# Patient Record
Sex: FEMALE | Race: WHITE | Employment: OTHER | URBAN - METROPOLITAN AREA
[De-identification: names, ages, dates, MRNs, and addresses within clinical notes are randomized per-mention and may not be internally consistent; named-entity substitution may affect disease eponyms.]

---

## 2017-02-09 ENCOUNTER — OFFICE VISIT (OUTPATIENT)
Dept: PAIN MANAGEMENT | Age: 57
End: 2017-02-09

## 2017-02-09 VITALS
HEART RATE: 86 BPM | BODY MASS INDEX: 23 KG/M2 | SYSTOLIC BLOOD PRESSURE: 149 MMHG | WEIGHT: 134 LBS | DIASTOLIC BLOOD PRESSURE: 84 MMHG

## 2017-02-09 DIAGNOSIS — M48.02 SPINAL STENOSIS IN CERVICAL REGION: ICD-10-CM

## 2017-02-09 DIAGNOSIS — Z79.899 ENCOUNTER FOR LONG-TERM (CURRENT) USE OF MEDICATIONS: ICD-10-CM

## 2017-02-09 DIAGNOSIS — M25.50 POLYARTHRALGIA: ICD-10-CM

## 2017-02-09 DIAGNOSIS — G89.4 CHRONIC PAIN SYNDROME: ICD-10-CM

## 2017-02-09 DIAGNOSIS — M50.30 DEGENERATION OF CERVICAL INTERVERTEBRAL DISC: ICD-10-CM

## 2017-02-09 DIAGNOSIS — M15.9 GENERALIZED OA: Primary | ICD-10-CM

## 2017-02-09 DIAGNOSIS — R51.9 CHRONIC DAILY HEADACHE: ICD-10-CM

## 2017-02-09 DIAGNOSIS — M51.37 DEGENERATION OF LUMBAR OR LUMBOSACRAL INTERVERTEBRAL DISC: ICD-10-CM

## 2017-02-09 LAB
ALCOHOL UR POC: NORMAL
AMPHETAMINES UR POC: NEGATIVE
BARBITURATES UR POC: NORMAL
BENZODIAZEPINES UR POC: NORMAL
BUPRENORPHINE UR POC: NORMAL
CANNABINOIDS UR POC: NEGATIVE
CARISOPRODOL UR POC: NORMAL
COCAINE UR POC: NEGATIVE
FENTANYL UR POC: NORMAL
MDMA/ECSTASY UR POC: NEGATIVE
METHADONE UR POC: NEGATIVE
METHAMPHETAMINE UR POC: NEGATIVE
METHYLPHENIDATE UR POC: NEGATIVE
OPIATES UR POC: NORMAL
OXYCODONE UR POC: NORMAL
PHENCYCLIDINE UR POC: NEGATIVE
PROPOXYPHENE UR POC: NORMAL
TRAMADOL UR POC: NORMAL
TRICYCLICS UR POC: NEGATIVE

## 2017-02-09 RX ORDER — HYDROCODONE BITARTRATE AND ACETAMINOPHEN 10; 325 MG/1; MG/1
1 TABLET ORAL
Qty: 150 TAB | Refills: 0 | Status: SHIPPED | OUTPATIENT
Start: 2017-02-09 | End: 2017-07-28 | Stop reason: SDUPTHER

## 2017-02-09 RX ORDER — HYDROCODONE BITARTRATE AND ACETAMINOPHEN 10; 325 MG/1; MG/1
1 TABLET ORAL
Qty: 150 TAB | Refills: 0 | Status: SHIPPED | OUTPATIENT
Start: 2017-04-08 | End: 2017-05-02 | Stop reason: SDUPTHER

## 2017-02-09 RX ORDER — HYDROCODONE BITARTRATE AND ACETAMINOPHEN 10; 325 MG/1; MG/1
1 TABLET ORAL
Qty: 150 TAB | Refills: 0 | Status: SHIPPED | OUTPATIENT
Start: 2017-03-10 | End: 2017-05-02 | Stop reason: SDUPTHER

## 2017-02-09 RX ORDER — BUTALBITAL, ACETAMINOPHEN AND CAFFEINE 50; 325; 40 MG/1; MG/1; MG/1
1 TABLET ORAL EVERY 4 HOURS
Qty: 180 TAB | Refills: 5 | Status: SHIPPED | OUTPATIENT
Start: 2017-05-06 | End: 2017-05-02 | Stop reason: SDUPTHER

## 2017-02-09 NOTE — PROGRESS NOTES
Nursing Notes    Patient presents to the office today in follow-up. Patient rates her pain at 2/10 on the numerical pain scale. Reviewed medications with counts as follows:    Rx Date filled Qty Dispensed Pill Count Last Dose Short   norco 10 1/11/17 150 31 2/9/17 no   Ativan  1/28/17 60 36 2/9/17 no         Comments:     POC UDS was performed in office today    Any new labs or imaging since last appointment? NO    Have you been to an emergency room (ER) or urgent care clinic since your last visit? NO            Have you been hospitalized since your last visit? NO     If yes, where, when, and reason for visit? Have you seen or consulted any other health care providers outside of the [address] [place] [address]  since your last visit? NO     If yes, where, when, and reason for visit? Ms. Mookie Jeff has a reminder for a \"due or due soon\" health maintenance. I have asked that she contact her primary care provider for follow-up on this health maintenance.

## 2017-02-09 NOTE — PROGRESS NOTES
HISTORY OF PRESENT ILLNESS  Lolis Solorzano is a 64 y.o. female. HPI she returns for follow-up of chronic, severe pain which is widespread and polyarticular and related to generalized osteoarthritis. She also suffers from chronic daily headaches and underlying cervical and lumbar degenerative disc disease with spondylosis and cervical stenosis. Pain continues under excellent control with 80% overall relief being reported. Physical activity and mobility as well as mood are good, sleep is fair. No reported side effects. A review of the [address] [place] [address] prescription monitoring program does not identify any inconsistency. UDS obtained and reviewed; formal confirmation from laboratory is pending. Review of Systems   Constitutional: Negative for weight loss (  ). Eyes:        Reading glasses   Respiratory: Negative. Cardiovascular: Negative. Gastrointestinal: Negative for constipation, nausea (with migraines) and vomiting. Genitourinary: Negative. Musculoskeletal: Positive for back pain, myalgias and neck pain. Skin: Negative. Neurological: Positive for tingling (hands) and headaches. Endo/Heme/Allergies:        Recent GI bleed/ulcers in January   Psychiatric/Behavioral: Negative. All other systems reviewed and are negative. Physical Exam   Constitutional: She is oriented to person, place, and time. She appears well-developed and well-nourished. No distress. HENT:   Head: Normocephalic. Eyes: EOM are normal. Pupils are equal, round, and reactive to light.   glasses   Neck: Normal range of motion. No thyromegaly present. Tender/tight musclature   Pulmonary/Chest: Effort normal.   Musculoskeletal: She exhibits tenderness (cervical/over shoulders/lumbar). She exhibits no edema. Neurological: She is alert and oriented to person, place, and time. No cranial nerve deficit (grossly intact).  Gait normal.   Patellar DTR wnl/ lower extremities-strength normal   Skin: Skin is warm and dry.   Psychiatric: She has a normal mood and affect. Her behavior is normal. Judgment and thought content normal.   Nursing note and vitals reviewed. ASSESSMENT and PLAN  Encounter Diagnoses   Name Primary?  Generalized OA Yes    Encounter for long-term (current) use of medications     Spinal stenosis in cervical region     Degeneration of cervical intervertebral disc     Chronic daily headache     Chronic pain syndrome     Degeneration of lumbar or lumbosacral intervertebral disc     Polyarthralgia      She will continue on her current analgesic regimen as this is providing excellent pain control with improve functionality and minimal side effects. 3 month reassess her    No concerns are raised for misuse, abuse, or diversion. 1. Pain medications are prescribed with the objective of pain relief and improved physical and psychosocial function in this patient. 2. Counseled patient on proper use of prescribed medications and reviewed opioid contract. 3. Counseled patient about chronic medical conditions and their relationship to anxiety and depression and recommended mental health support as needed. 4. Reviewed with patient self-help tools, home exercise, and lifestyle changes to assist the patient in self-management of symptoms. 5. Advised patient to have a primary care provider to continue care for health maintenance and general medical conditions and support for referral to specialty care as needed. 6. Reviewed with patient the treatment plan, goals of treatment plan, and limitations of treatment plan, to include the potential for side effects from medications and procedures. If side effects occur, it is the responsibility of the patient to inform the clinic so that a change in the treatment plan can be made in a safe manner. The patient is advised that stopping prescribed medication may cause an increase in symptoms and possible medication withdrawal symptoms.  The patient is informed an emergency room evaluation may be necessary if this occurs. DISPOSITION: The patients condition and plan were discussed at length and all questions were answered. The patient agrees with the plan.     Counseling occupied > 50% of visit:  Total time: 30 minutes

## 2017-02-09 NOTE — MR AVS SNAPSHOT
Visit Information Date & Time Provider Department Dept. Phone Encounter #  
 2/9/2017 11:20 AM Monico Alcantar MD [facility] [id number] Follow-up Instructions Return in about 3 months (around 5/9/2017). Upcoming Health Maintenance Date Due  
 PAP AKA CERVICAL CYTOLOGY 9/1/1981 FOBT Q 1 YEAR AGE 50-75 9/1/2010 INFLUENZA AGE 9 TO ADULT 8/1/2016 BREAST CANCER SCRN MAMMOGRAM 6/30/2017 DTaP/Tdap/Td series (2 - Td) 2/6/2025 Allergies as of 2/9/2017  Review Complete On: 2/9/2017 By: Monico Alcantar MD  
  
 Severity Noted Reaction Type Reactions Aspirin  10/20/2011   Side Effect Other (comments) Nsaids (Non-steroidal Anti-inflammatory Drug)  07/28/2011   Intolerance Other (comments) Pt has had bleeding ulcers Current Immunizations  Reviewed on 9/15/2016 Name Date Influenza Vaccine 1/21/2013 Influenza Vaccine Whole 11/5/2009 Pneumococcal Polysaccharide (PPSV-23) 9/20/2016 TD Vaccine 6/14/2006 Tdap 2/6/2015  1:43 PM  
 Zoster Vaccine, Live 5/2/2013 Not reviewed this visit You Were Diagnosed With   
  
 Codes Comments Encounter for long-term (current) use of medications    -  Primary ICD-10-CM: B89.090 ICD-9-CM: V58.69 Spinal stenosis in cervical region     ICD-10-CM: M48.02 
ICD-9-CM: 723.0 Degeneration of cervical intervertebral disc     ICD-10-CM: M50.30 ICD-9-CM: 722.4 Vitals BP Pulse Weight(growth percentile) BMI OB Status Smoking Status 149/84 86 134 lb (60.8 kg) 23 kg/m2 Postmenopausal Current Every Day Smoker BMI and BSA Data Body Mass Index Body Surface Area  
 23 kg/m 2 1.66 m 2 Preferred Pharmacy Pharmacy Name Phone [address], [place] [address] [facility] [address] [facility] [phone] Your Updated Medication List  
  
   
 This list is accurate as of: 2/9/17 11:43 AM.  Always use your most recent med list.  
  
  
  
  
 albuterol 90 mcg/actuation inhaler Commonly known as:  Pablo Laroche Take 2 Puffs by inhalation every six (6) hours as needed. busPIRone 30 mg tablet Commonly known as:  BUSPAR Take 1 Tab by mouth two (2) times a day for 90 days. butalbital-acetaminophen-caffeine -40 mg per tablet Commonly known as:  Laveda Forge Take 1 Tab by mouth every four (4) hours for 30 days. Up to 6 times per day prn migraine  Indications: MIGRAINE, TENSION-TYPE HEADACHE Start taking on:  5/6/2017  
  
 cyclobenzaprine 10 mg tablet Commonly known as:  FLEXERIL  
TAKE 1 TABLET BY MOUTH THREE TIMES DAILY AS NEEDED FOR MUSCLE SPASM(S)  
  
 ergocalciferol 50,000 unit capsule Commonly known as:  ERGOCALCIFEROL  
TAKE 1 CAPSULE BY MOUTH EVERY 7 DAYS  
  
 * HYDROcodone-acetaminophen  mg tablet Commonly known as:  Rolinda Doles Take 1 Tab by mouth five (5) times daily for 30 days. For chronic pain  Indications: Pain  
  
 * HYDROcodone-acetaminophen  mg tablet Commonly known as:  Rolinda Doles Take 1 Tab by mouth five (5) times daily for 30 days. For chronic pain  Indications: Pain Start taking on:  3/10/2017  
  
 * HYDROcodone-acetaminophen  mg tablet Commonly known as:  Rolinda Doles Take 1 Tab by mouth five (5) times daily for 30 days. For chronic pain  Indications: Pain Start taking on:  4/8/2017  
  
 loratadine 10 mg tablet Commonly known as:  Latonia [place]un Take 10 mg by mouth. * pantoprazole 40 mg tablet Commonly known as:  PROTONIX  
TAKE 1 TABLET BY MOUTH ONCE DAILY  
  
 * pantoprazole 40 mg tablet Commonly known as:  PROTONIX  
TAKE 1 TABLET BY MOUTH ONCE DAILY promethazine 25 mg tablet Commonly known as:  PHENERGAN  
TAKE 1 TABLET BY MOUTH EVERY 8 HOURS AS NEEDED FOR NAUSEA/VOMITING FOR UP TO 90 DAYS. SUDAFED PO Take  by mouth. SUMAtriptan 100 mg tablet Commonly known as:  IMITREX Take 1 Tab by mouth once as needed for Migraine for up to 1 dose. Indications: MIGRAINE ZyrTEC 10 mg tablet Generic drug:  cetirizine Take  by mouth daily. * Notice: This list has 5 medication(s) that are the same as other medications prescribed for you. Read the directions carefully, and ask your doctor or other care provider to review them with you. Prescriptions Printed Refills HYDROcodone-acetaminophen (NORCO)  mg tablet 0 Starting on: 4/8/2017 Sig: Take 1 Tab by mouth five (5) times daily for 30 days. For chronic pain  Indications: Pain Class: Print Route: Oral  
 HYDROcodone-acetaminophen (NORCO)  mg tablet 0 Starting on: 3/10/2017 Sig: Take 1 Tab by mouth five (5) times daily for 30 days. For chronic pain  Indications: Pain Class: Print Route: Oral  
 HYDROcodone-acetaminophen (NORCO)  mg tablet 0 Sig: Take 1 Tab by mouth five (5) times daily for 30 days. For chronic pain  Indications: Pain Class: Print Route: Oral  
 butalbital-acetaminophen-caffeine (FIORICET, ESGIC) -40 mg per tablet 5 Starting on: 5/6/2017 Sig: Take 1 Tab by mouth every four (4) hours for 30 days. Up to 6 times per day prn migraine  Indications: MIGRAINE, TENSION-TYPE HEADACHE Class: Print Route: Oral  
  
We Performed the Following AMB POC DRUG SCREEN () [ [facility]] DRUG SCREEN [[id number] Custom] Follow-up Instructions Return in about 3 months (around 5/9/2017). Patient Instructions Current health maintenance issues were reviewed and the patient was advised to followup with his/her PCP for completion of these items. Introducing [facility]! Marsha Chambers introduces [employer] patient portal. Now you can access parts of your medical record, email your doctor's office, and request medication refills online. 1. In your internet browser, go to [email or web address] 2. Click on the First Time User? Click Here link in the Sign In box. You will see the New Member Sign Up page. 3. Enter your [employer] Access Code exactly as it appears below. You will not need to use this code after youve completed the sign-up process. If you do not sign up before the expiration date, you must request a new code. · Watchupt Access Code: [facility] Expires: 5/10/2017 11:36 AM 
 
4. Enter the last four digits of your Social Security Number (xxxx) and Date of Birth (mm/dd/yyyy) as indicated and click Submit. You will be taken to the next sign-up page. 5. Create a Watchupt ID. This will be your Thermedical login ID and cannot be changed, so think of one that is secure and easy to remember. 6. Create a [employer] password. You can change your password at any time. 7. Enter your Password Reset Question and Answer. This can be used at a later time if you forget your password. 8. Enter your e-mail address. You will receive e-mail notification when new information is available in [address] 9. Click Sign Up. You can now view and download portions of your medical record. 10. Click the Download Summary menu link to download a portable copy of your medical information. If you have questions, please visit the Frequently Asked Questions section of the [employer] website. Remember, [employer] is NOT to be used for urgent needs. For medical emergencies, dial 911. Now available from your iPhone and Android! Please provide this summary of care documentation to your next provider. Your primary care clinician is listed as Nikos Left. If you have any questions after today's visit, please call [phone].

## 2017-03-20 DIAGNOSIS — Z00.00 PHYSICAL EXAM: ICD-10-CM

## 2017-03-22 DIAGNOSIS — Z00.00 PHYSICAL EXAM: ICD-10-CM

## 2017-05-02 ENCOUNTER — OFFICE VISIT (OUTPATIENT)
Dept: PAIN MANAGEMENT | Age: 57
End: 2017-05-02

## 2017-05-02 VITALS
HEART RATE: 98 BPM | BODY MASS INDEX: 23 KG/M2 | SYSTOLIC BLOOD PRESSURE: 135 MMHG | DIASTOLIC BLOOD PRESSURE: 90 MMHG | WEIGHT: 134 LBS

## 2017-05-02 DIAGNOSIS — M15.9 GENERALIZED OA: Primary | ICD-10-CM

## 2017-05-02 DIAGNOSIS — R51.9 CHRONIC DAILY HEADACHE: ICD-10-CM

## 2017-05-02 DIAGNOSIS — M25.50 POLYARTHRALGIA: ICD-10-CM

## 2017-05-02 DIAGNOSIS — M50.30 DEGENERATION OF CERVICAL INTERVERTEBRAL DISC: ICD-10-CM

## 2017-05-02 DIAGNOSIS — M51.37 DEGENERATION OF LUMBAR OR LUMBOSACRAL INTERVERTEBRAL DISC: ICD-10-CM

## 2017-05-02 DIAGNOSIS — M48.02 SPINAL STENOSIS IN CERVICAL REGION: ICD-10-CM

## 2017-05-02 DIAGNOSIS — G89.4 CHRONIC PAIN SYNDROME: ICD-10-CM

## 2017-05-02 DIAGNOSIS — Z79.899 ENCOUNTER FOR LONG-TERM (CURRENT) USE OF HIGH-RISK MEDICATION: ICD-10-CM

## 2017-05-02 LAB
ALCOHOL UR POC: NORMAL
AMPHETAMINES UR POC: NEGATIVE
BARBITURATES UR POC: NEGATIVE
BENZODIAZEPINES UR POC: NORMAL
BUPRENORPHINE UR POC: NORMAL
CANNABINOIDS UR POC: NEGATIVE
CARISOPRODOL UR POC: NORMAL
COCAINE UR POC: NEGATIVE
FENTANYL UR POC: NORMAL
MDMA/ECSTASY UR POC: NEGATIVE
METHADONE UR POC: NEGATIVE
METHAMPHETAMINE UR POC: NEGATIVE
METHYLPHENIDATE UR POC: NEGATIVE
OPIATES UR POC: NORMAL
OXYCODONE UR POC: NEGATIVE
PHENCYCLIDINE UR POC: NEGATIVE
PROPOXYPHENE UR POC: NORMAL
TRAMADOL UR POC: NORMAL
TRICYCLICS UR POC: NEGATIVE

## 2017-05-02 RX ORDER — LORAZEPAM 1 MG/1
1 TABLET ORAL
Qty: 60 TAB | Refills: 5 | Status: SHIPPED | OUTPATIENT
Start: 2017-05-22 | End: 2017-10-20 | Stop reason: SDUPTHER

## 2017-05-02 RX ORDER — HYDROCODONE BITARTRATE AND ACETAMINOPHEN 10; 325 MG/1; MG/1
1 TABLET ORAL
Qty: 150 TAB | Refills: 0 | Status: SHIPPED | OUTPATIENT
Start: 2017-05-31 | End: 2017-06-30

## 2017-05-02 RX ORDER — NALOXONE HYDROCHLORIDE 4 MG/.1ML
4 SPRAY NASAL AS NEEDED
Qty: 1 BOX | Refills: 1 | Status: SHIPPED | OUTPATIENT
Start: 2017-05-02

## 2017-05-02 RX ORDER — VENLAFAXINE HYDROCHLORIDE 75 MG/1
CAPSULE, EXTENDED RELEASE ORAL
Qty: 270 CAP | Refills: 3 | Status: SHIPPED | OUTPATIENT
Start: 2017-05-02 | End: 2020-06-10

## 2017-05-02 RX ORDER — BUTALBITAL, ACETAMINOPHEN AND CAFFEINE 50; 325; 40 MG/1; MG/1; MG/1
1 TABLET ORAL EVERY 4 HOURS
Qty: 180 TAB | Refills: 5 | Status: SHIPPED | OUTPATIENT
Start: 2017-05-06 | End: 2017-07-28 | Stop reason: SDUPTHER

## 2017-05-02 RX ORDER — HYDROCODONE BITARTRATE AND ACETAMINOPHEN 10; 325 MG/1; MG/1
1 TABLET ORAL
Qty: 150 TAB | Refills: 0 | Status: SHIPPED | OUTPATIENT
Start: 2017-06-29 | End: 2017-07-29

## 2017-05-02 NOTE — PROGRESS NOTES
HISTORY OF PRESENT ILLNESS  Ramiro Way is a 64 y.o. female. HPI she returns for follow-up of chronic, severe pain which is widespread and polyarticular and related to generalized osteoarthritis. She also suffers from chronic daily headaches and underlying cervical and lumbar degenerative disc disease with spondylosis and cervical stenosis.     Pain continues under excellent control with 80% overall relief being reported. Pain level today 3 out of 10, outcome 5/28,(The lower the upper number, the better the outcome)  Physical activity and mobility are very good, mood is fair to good, sleep is good. No reported side effects. A current review of the  does not identify any inconsistency. UDS obtained and reviewed; formal confirmation from laboratory is pending. Review of Systems   Constitutional: Negative for weight loss (  ). Eyes:        Reading glasses   Respiratory: Negative. Cardiovascular: Negative. Gastrointestinal: Negative for constipation, nausea (with migraines) and vomiting. Genitourinary: Negative. Musculoskeletal: Positive for back pain, myalgias and neck pain. Skin: Negative. Neurological: Positive for tingling (hands) and headaches. Endo/Heme/Allergies:        Recent GI bleed/ulcers in January   Psychiatric/Behavioral: Negative. All other systems reviewed and are negative. Physical Exam   Constitutional: She is oriented to person, place, and time. She appears well-developed and well-nourished. No distress. HENT:   Head: Normocephalic. Eyes: EOM are normal. Pupils are equal, round, and reactive to light.   glasses   Neck: Normal range of motion. No thyromegaly present. Tender/tight musclature   Pulmonary/Chest: Effort normal.   Musculoskeletal: She exhibits tenderness (cervical/over shoulders/lumbar). She exhibits no edema. Neurological: She is alert and oriented to person, place, and time. No cranial nerve deficit (grossly intact).  Gait normal. Patellar DTR wnl/ lower extremities-strength normal   Skin: Skin is warm and dry. Psychiatric: She has a normal mood and affect. Her behavior is normal. Judgment and thought content normal.   Nursing note and vitals reviewed. ASSESSMENT and PLAN  Encounter Diagnoses   Name Primary?  Generalized OA Yes    Encounter for long-term (current) use of high-risk medication     Spinal stenosis in cervical region     Degeneration of cervical intervertebral disc     Chronic daily headache     Chronic pain syndrome     Degeneration of lumbar or lumbosacral intervertebral disc     Polyarthralgia      The results of her most recent urine drug screen revealing tramadol were discussed with the patient who adamantly denies the use of tramadol. Neither her  or her mother take this medication. 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 In any event, a repeat urine drug screen is obtained today as noted above. Because the patient's current regimen places him/her at increased risk for possible overdose, a prescription for naloxone nasal spray is being provided. The patient understands that this medication is only to be used in the setting of a possible overdose and that inadvertent use of this medication could precipitate overt withdrawal.    She will continue on her current analgesic regimen as this is providing excellent pain control with improve functionality and minimal side effects. 3 month reassess    No concerns are raised for misuse, abuse, or diversion. 1. Pain medications are prescribed with the objective of pain relief and improved physical and psychosocial function in this patient. 2. Counseled patient on proper use of prescribed medications and reviewed opioid contract.   3. Counseled patient about chronic medical conditions and their relationship to anxiety and depression and recommended mental health support as needed. 4. Reviewed with patient self-help tools, home exercise, and lifestyle changes to assist the patient in self-management of symptoms. 5. Advised patient to have a primary care provider to continue care for health maintenance and general medical conditions and support for referral to specialty care as needed. 6. Reviewed with patient the treatment plan, goals of treatment plan, and limitations of treatment plan, to include the potential for side effects from medications and procedures. If side effects occur, it is the responsibility of the patient to inform the clinic so that a change in the treatment plan can be made in a safe manner. The patient is advised that stopping prescribed medication may cause an increase in symptoms and possible medication withdrawal symptoms. The patient is informed an emergency room evaluation may be necessary if this occurs. DISPOSITION: The patients condition and plan were discussed at length and all questions were answered. The patient agrees with the plan.     Counseling occupied > 50% of visit:  Total time: 40 minutes

## 2017-05-02 NOTE — PROGRESS NOTES
Nursing Notes    Patient presents to the office today in follow-up. Patient rates her pain at 3/10 on the numerical pain scale. Reviewed medications with counts as follows:    Rx Date filled Qty Dispensed Pill Count Last Dose Short   Ativan 1 4/24/17 60 43 5/2/17 no   norco 10 4/1/17 150 17 5/2/17 no       Comments: pt does not know how tramadol got in her system. POC UDS was performed in office today    Any new labs or imaging since last appointment? no    Have you been to an emergency room (ER) or urgent care clinic since your last visit? NO , was seen by physician after altercation with dog       Have you been hospitalized since your last visit? NO     If yes, where, when, and reason for visit? Have you seen or consulted any other health care providers outside of the [facility]  since your last visit? NO     If yes, where, when, and reason for visit? Ms. Winston Alvarado has a reminder for a \"due or due soon\" health maintenance. I have asked that she contact her primary care provider for follow-up on this health maintenance.

## 2017-05-02 NOTE — MR AVS SNAPSHOT
Visit Information Date & Time Provider Department Dept. Phone Encounter #  
 5/2/2017  9:40 AM Luigi Wisdom MD [facility] [phone] Follow-up Instructions Return in about 3 months (around 8/2/2017). Upcoming Health Maintenance Date Due  
 PAP AKA CERVICAL CYTOLOGY 9/1/1981 FOBT Q 1 YEAR AGE 50-75 9/1/2010 BREAST CANCER SCRN MAMMOGRAM 6/30/2017 INFLUENZA AGE 9 TO ADULT 8/1/2017 DTaP/Tdap/Td series (2 - Td) 2/6/2025 Allergies as of 5/2/2017  Review Complete On: 5/2/2017 By: Luigi Wisdom MD  
  
 Severity Noted Reaction Type Reactions Aspirin  10/20/2011   Side Effect Other (comments) Nsaids (Non-steroidal Anti-inflammatory Drug)  07/28/2011   Intolerance Other (comments) Pt has had bleeding ulcers Current Immunizations  Reviewed on 9/15/2016 Name Date Influenza Vaccine 1/21/2013 Influenza Vaccine Whole 11/5/2009 Pneumococcal Polysaccharide (PPSV-23) 9/20/2016 TD Vaccine 6/14/2006 Tdap 2/6/2015  1:43 PM  
 Zoster Vaccine, Live 5/2/2013 Not reviewed this visit You Were Diagnosed With   
  
 Codes Comments Encounter for long-term (current) use of high-risk medication    -  Primary ICD-10-CM: E69.880 ICD-9-CM: V58.69 Spinal stenosis in cervical region     ICD-10-CM: M48.02 
ICD-9-CM: 723.0 Degeneration of cervical intervertebral disc     ICD-10-CM: M50.30 ICD-9-CM: 722.4 Vitals BP Pulse Weight(growth percentile) BMI OB Status Smoking Status 135/90 98 134 lb (60.8 kg) 23 kg/m2 Postmenopausal Current Every Day Smoker Vitals History BMI and BSA Data Body Mass Index Body Surface Area  
 23 kg/m 2 1.66 m 2 Preferred Pharmacy Pharmacy Name Phone [facility]/PHARMACY #[id number] [facility], [facility] [facility] [phone] Your Updated Medication List  
  
   
 This list is accurate as of: 5/2/17 10:36 AM.  Always use your most recent med list.  
  
  
  
  
 albuterol 90 mcg/actuation inhaler Commonly known as:  Naveen Nunu Take 2 Puffs by inhalation every six (6) hours as needed. butalbital-acetaminophen-caffeine -40 mg per tablet Commonly known as:  Nena Rings Take 1 Tab by mouth every four (4) hours for 30 days. Up to 6 times per day prn migraine  Indications: MIGRAINE, TENSION-TYPE HEADACHE Start taking on:  5/6/2017  
  
 cyclobenzaprine 10 mg tablet Commonly known as:  FLEXERIL  
TAKE 1 TABLET BY MOUTH THREE TIMES DAILY AS NEEDED FOR MUSCLE SPASM(S)  
  
 * HYDROcodone-acetaminophen  mg tablet Commonly known as:  Senthil Nigh Take 1 Tab by mouth five (5) times daily for 30 days. For chronic pain  Indications: Pain Start taking on:  5/31/2017  
  
 * HYDROcodone-acetaminophen  mg tablet Commonly known as:  Senthil Nigh Take 1 Tab by mouth five (5) times daily for 30 days. For chronic pain  Indications: Pain Start taking on:  6/29/2017  
  
 loratadine 10 mg tablet Commonly known as:  Rodriguez Brothers Take 10 mg by mouth. LORazepam 1 mg tablet Commonly known as:  ATIVAN Take 1 Tab by mouth two (2) times daily as needed for up to 30 days. Indications: MUSCLE SPASM Start taking on:  5/22/2017  
  
 naloxone 4 mg/actuation Spry 4 mg by Nasal route as needed for up to 2 doses. Indications: OPIOID TOXICITY  
  
 pantoprazole 40 mg tablet Commonly known as:  PROTONIX  
TAKE 1 TABLET BY MOUTH ONCE DAILY SUDAFED PO Take  by mouth. SUMAtriptan 100 mg tablet Commonly known as:  IMITREX Take 1 Tab by mouth once as needed for Migraine for up to 1 dose. Indications: MIGRAINE  
  
 venlafaxine-SR 75 mg capsule Commonly known as:  EFFEXOR-XR  
TAKE 3 CAPSULES BY MOUTH ONCE DAILY  90 days ZyrTEC 10 mg tablet Generic drug:  cetirizine Take  by mouth daily. * Notice: This list has 2 medication(s) that are the same as other medications prescribed for you. Read the directions carefully, and ask your doctor or other care provider to review them with you. Prescriptions Printed Refills HYDROcodone-acetaminophen (NORCO)  mg tablet 0 Starting on: 2017 Sig: Take 1 Tab by mouth five (5) times daily for 30 days. For chronic pain  Indications: Pain Class: Print Route: Oral  
 HYDROcodone-acetaminophen (NORCO)  mg tablet 0 Starting on: 2017 Sig: Take 1 Tab by mouth five (5) times daily for 30 days. For chronic pain  Indications: Pain Class: Print Route: Oral  
 LORazepam (ATIVAN) 1 mg tablet 5 Starting on: 2017 Sig: Take 1 Tab by mouth two (2) times daily as needed for up to 30 days. Indications: MUSCLE SPASM Class: Print Route: Oral  
 butalbital-acetaminophen-caffeine (FIORICET, ESGIC) -40 mg per tablet 5 Starting on: 2017 Sig: Take 1 Tab by mouth every four (4) hours for 30 days. Up to 6 times per day prn migraine  Indications: MIGRAINE, TENSION-TYPE HEADACHE Class: Print Route: Oral  
  
Prescriptions Sent to Pharmacy Refills  
 venlafaxine-SR (EFFEXOR-XR) 75 mg capsule 3 Sig: TAKE 3 CAPSULES BY MOUTH ONCE DAILY  90 days Class: Normal  
 Pharmacy: [facility]/pharmacy [address] Ph #: [phone]  
 naloxone 4 mg/actuation spry 1 Si mg by Nasal route as needed for up to 2 doses. Indications: OPIOID TOXICITY Class: Normal  
 Pharmacy: [facility]/pharmacy [id number] Ph #: [phone] Route: Nasal  
  
We Performed the Following AMB POC DRUG SCREEN () [ [facility]] DRUG SCREEN [[id number] Custom] Follow-up Instructions Return in about 3 months (around 2017). Patient Instructions Current health maintenance issues were reviewed and the patient was advised to followup with his/her PCP for completion of these items. Introducing [facility]! Marisela Pace introduces GetNinjas patient portal. Now you can access parts of your medical record, email your doctor's office, and request medication refills online. 1. In your internet browser, go to [email or web address] 2. Click on the First Time User? Click Here link in the Sign In box. You will see the New Member Sign Up page. 3. Enter your GetNinjas Access Code exactly as it appears below. You will not need to use this code after youve completed the sign-up process. If you do not sign up before the expiration date, you must request a new code. · GetNinjas Access Code: [facility] Expires: 5/10/2017 12:36 PM 
 
4. Enter the last four digits of your Social Security Number (xxxx) and Date of Birth (mm/dd/yyyy) as indicated and click Submit. You will be taken to the next sign-up page. 5. Create a GetNinjas ID. This will be your GetNinjas login ID and cannot be changed, so think of one that is secure and easy to remember. 6. Create a GetNinjas password. You can change your password at any time. 7. Enter your Password Reset Question and Answer. This can be used at a later time if you forget your password. 8. Enter your e-mail address. You will receive e-mail notification when new information is available in [address] 9. Click Sign Up. You can now view and download portions of your medical record. 10. Click the Download Summary menu link to download a portable copy of your medical information. If you have questions, please visit the Frequently Asked Questions section of the GetNinjas website. Remember, GetNinjas is NOT to be used for urgent needs. For medical emergencies, dial 911. Now available from your iPhone and Android! Please provide this summary of care documentation to your next provider. Your primary care clinician is listed as Ramona Nageotte. If you have any questions after today's visit, please call [phone].

## 2017-05-05 DIAGNOSIS — K21.9 GASTROESOPHAGEAL REFLUX DISEASE WITHOUT ESOPHAGITIS: ICD-10-CM

## 2017-05-05 DIAGNOSIS — K27.9 PEPTIC ULCER DISEASE: ICD-10-CM

## 2017-05-11 RX ORDER — PANTOPRAZOLE SODIUM 40 MG/1
TABLET, DELAYED RELEASE ORAL
Qty: 90 TAB | Refills: 0 | Status: SHIPPED | OUTPATIENT
Start: 2017-05-11 | End: 2017-08-08 | Stop reason: SDUPTHER

## 2017-07-28 ENCOUNTER — OFFICE VISIT (OUTPATIENT)
Dept: PAIN MANAGEMENT | Age: 57
End: 2017-07-28

## 2017-07-28 VITALS
RESPIRATION RATE: 17 BRPM | HEART RATE: 95 BPM | WEIGHT: 134 LBS | BODY MASS INDEX: 23 KG/M2 | SYSTOLIC BLOOD PRESSURE: 135 MMHG | DIASTOLIC BLOOD PRESSURE: 79 MMHG

## 2017-07-28 DIAGNOSIS — Z79.899 ENCOUNTER FOR LONG-TERM (CURRENT) USE OF HIGH-RISK MEDICATION: ICD-10-CM

## 2017-07-28 DIAGNOSIS — M25.50 POLYARTHRALGIA: ICD-10-CM

## 2017-07-28 DIAGNOSIS — M48.02 SPINAL STENOSIS IN CERVICAL REGION: ICD-10-CM

## 2017-07-28 DIAGNOSIS — M50.30 DEGENERATION OF CERVICAL INTERVERTEBRAL DISC: ICD-10-CM

## 2017-07-28 DIAGNOSIS — G89.4 CHRONIC PAIN SYNDROME: ICD-10-CM

## 2017-07-28 DIAGNOSIS — M51.37 DEGENERATION OF LUMBAR OR LUMBOSACRAL INTERVERTEBRAL DISC: ICD-10-CM

## 2017-07-28 DIAGNOSIS — M15.9 GENERALIZED OA: ICD-10-CM

## 2017-07-28 DIAGNOSIS — R51.9 CHRONIC DAILY HEADACHE: Primary | ICD-10-CM

## 2017-07-28 RX ORDER — OLANZAPINE 5 MG/1
5 TABLET ORAL
Qty: 30 TAB | Refills: 5 | Status: SHIPPED | OUTPATIENT
Start: 2017-07-28 | End: 2017-08-27

## 2017-07-28 RX ORDER — SUMATRIPTAN 20 MG/1
SPRAY NASAL
Qty: 18 CONTAINER | Refills: 3 | Status: SHIPPED | OUTPATIENT
Start: 2017-07-28 | End: 2017-09-08 | Stop reason: ALTCHOICE

## 2017-07-28 RX ORDER — HYDROCODONE BITARTRATE AND ACETAMINOPHEN 10; 325 MG/1; MG/1
1 TABLET ORAL
Qty: 150 TAB | Refills: 0 | Status: SHIPPED | OUTPATIENT
Start: 2017-08-12 | End: 2017-09-08 | Stop reason: SDUPTHER

## 2017-07-28 RX ORDER — BUTALBITAL, ACETAMINOPHEN AND CAFFEINE 50; 325; 40 MG/1; MG/1; MG/1
1 TABLET ORAL EVERY 4 HOURS
Qty: 180 TAB | Refills: 5 | Status: SHIPPED | OUTPATIENT
Start: 2017-09-21 | End: 2017-10-21

## 2017-07-28 RX ORDER — HYDROCODONE BITARTRATE AND ACETAMINOPHEN 10; 325 MG/1; MG/1
1 TABLET ORAL
Qty: 150 TAB | Refills: 0 | Status: SHIPPED | OUTPATIENT
Start: 2017-09-10 | End: 2017-09-08 | Stop reason: SDUPTHER

## 2017-07-28 RX ORDER — HYDROCODONE BITARTRATE AND ACETAMINOPHEN 10; 325 MG/1; MG/1
1 TABLET ORAL
Qty: 150 TAB | Refills: 0 | Status: SHIPPED | OUTPATIENT
Start: 2017-10-08 | End: 2017-11-07

## 2017-07-28 RX ORDER — CYCLOBENZAPRINE HCL 10 MG
TABLET ORAL
Qty: 270 TAB | Refills: 3 | Status: SHIPPED | OUTPATIENT
Start: 2017-07-28

## 2017-07-28 NOTE — PROGRESS NOTES
Nursing Notes    Patient presents to the office today in follow-up. Patient rates her pain at 4/10 on the numerical pain scale. Reviewed medications with counts as follows:    Rx Date filled Qty Dispensed Pill Count Last Dose Short   Ativan 1 7/18/17 60 45 7/28/17 no   norco 10 7/14/17 150 19 7/28/17 no   fioricet  7/25/17 180 143 7/28/17 no         Comments:     POC UDS was not performed in office today    Any new labs or imaging since last appointment? NO    Have you been to an emergency room (ER) or urgent care clinic since your last visit? NO            Have you been hospitalized since your last visit? NO     If yes, where, when, and reason for visit? Have you seen or consulted any other health care providers outside of the [address] [place] [address]  since your last visit? NO     If yes, where, when, and reason for visit? Ms. Kishore Blunt has a reminder for a \"due or due soon\" health maintenance. I have asked that she contact her primary care provider for follow-up on this health maintenance.

## 2017-07-28 NOTE — MR AVS SNAPSHOT
Visit Information Date & Time Provider Department Dept. Phone Encounter #  
 7/28/2017 10:00 AM Curtis Bunn MD [facility] [phone] Follow-up Instructions Return in about 3 months (around 10/28/2017). Upcoming Health Maintenance Date Due  
 PAP AKA CERVICAL CYTOLOGY 9/1/1981 FOBT Q 1 YEAR AGE 50-75 9/1/2010 BREAST CANCER SCRN MAMMOGRAM 6/30/2017 INFLUENZA AGE 9 TO ADULT 8/1/2017 DTaP/Tdap/Td series (2 - Td) 2/6/2025 Allergies as of 7/28/2017  Review Complete On: 7/28/2017 By: Curtis Bunn MD  
  
 Severity Noted Reaction Type Reactions Aspirin  10/20/2011   Side Effect Other (comments) Nsaids (Non-steroidal Anti-inflammatory Drug)  07/28/2011   Intolerance Other (comments) Pt has had bleeding ulcers Current Immunizations  Reviewed on 9/15/2016 Name Date Influenza Vaccine 1/21/2013 Influenza Vaccine Whole 11/5/2009 Pneumococcal Polysaccharide (PPSV-23) 9/20/2016 TD Vaccine 6/14/2006 Tdap 2/6/2015  1:43 PM  
 Zoster Vaccine, Live 5/2/2013 Not reviewed this visit You Were Diagnosed With   
  
 Codes Comments Chronic daily headache    -  Primary ICD-10-CM: V82 ICD-9-CM: 784.0 Spinal stenosis in cervical region     ICD-10-CM: M48.02 
ICD-9-CM: 723.0 Degeneration of cervical intervertebral disc     ICD-10-CM: M50.30 ICD-9-CM: 722.4 Chronic pain syndrome     ICD-10-CM: G89.4 ICD-9-CM: 338.4 Encounter for long-term (current) use of high-risk medication     ICD-10-CM: Z79.899 ICD-9-CM: V58.69 Degeneration of lumbar or lumbosacral intervertebral disc     ICD-10-CM: M51.37 
ICD-9-CM: 722.52 Polyarthralgia     ICD-10-CM: M25.50 ICD-9-CM: 719.49 Generalized OA     ICD-10-CM: M15.9 ICD-9-CM: 715.00 Vitals BP Pulse Resp Weight(growth percentile) BMI OB Status 135/79 95 17 134 lb (60.8 kg) 23 kg/m2 Postmenopausal  
 Smoking Status Current Every Day Smoker BMI and BSA Data Body Mass Index Body Surface Area  
 23 kg/m 2 1.66 m 2 Preferred Pharmacy Pharmacy Name Phone [id number], [address] [place] [address],[address] Floor [facility] [phone] Your Updated Medication List  
  
   
This list is accurate as of: 7/28/17 10:51 AM.  Always use your most recent med list.  
  
  
  
  
 albuterol 90 mcg/actuation inhaler Commonly known as:  Thee [place] Take 2 Puffs by inhalation every six (6) hours as needed. butalbital-acetaminophen-caffeine -40 mg per tablet Commonly known as:  Gwynda Duet Take 1 Tab by mouth every four (4) hours for 30 days. Up to 6 times per day prn migraine  Indications: MIGRAINE, TENSION-TYPE HEADACHE Start taking on:  9/21/2017  
  
 cyclobenzaprine 10 mg tablet Commonly known as:  FLEXERIL  
TAKE 1 TABLET BY MOUTH THREE TIMES DAILY AS NEEDED FOR MUSCLE SPASM(S)--- 90 day supply  Indications: MUSCLE SPASM  
  
 * HYDROcodone-acetaminophen  mg tablet Commonly known as:  [address] Take 1 Tab by mouth five (5) times daily for 30 days. For chronic pain  Indications: Pain  
  
 * HYDROcodone-acetaminophen  mg tablet Commonly known as:  [address] Take 1 Tab by mouth five (5) times daily for 30 days. For chronic pain  Indications: Pain Start taking on:  8/12/2017  
  
 * HYDROcodone-acetaminophen  mg tablet Commonly known as:  [address] Take 1 Tab by mouth five (5) times daily for 30 days. For chronic pain  Indications: Pain Start taking on:  9/10/2017  
  
 * HYDROcodone-acetaminophen  mg tablet Commonly known as:  [address] Take 1 Tab by mouth five (5) times daily for 30 days. For chronic pain  Indications: Pain Start taking on:  10/8/2017  
  
 loratadine 10 mg tablet Commonly known as:  Gabriela Nett Take 10 mg by mouth.  
  
 naloxone 4 mg/actuation Spry 4 mg by Nasal route as needed for up to 2 doses. Indications: OPIOID TOXICITY  
  
 OLANZapine 5 mg tablet Commonly known as:  ZyPREXA Take 1 Tab by mouth nightly for 30 days. pantoprazole 40 mg tablet Commonly known as:  PROTONIX  
TAKE 1 TABLET BY MOUTH ONCE DAILY SUDAFED PO Take  by mouth. SUMAtriptan 100 mg tablet Commonly known as:  IMITREX Take 1 Tab by mouth once as needed for Migraine for up to 1 dose. Indications: MIGRAINE  
  
 SUMAtriptan 20 mg/actuation nasal spray Commonly known as:  IMITREX Use as directed for acute migraine ---- 90 day supply  Indications: MIGRAINE  
  
 venlafaxine-SR 75 mg capsule Commonly known as:  EFFEXOR-XR  
TAKE 3 CAPSULES BY MOUTH ONCE DAILY  90 days ZyrTEC 10 mg tablet Generic drug:  cetirizine Take  by mouth daily. * Notice: This list has 4 medication(s) that are the same as other medications prescribed for you. Read the directions carefully, and ask your doctor or other care provider to review them with you. Prescriptions Printed Refills HYDROcodone-acetaminophen (NORCO)  mg tablet 0 Starting on: 10/8/2017 Sig: Take 1 Tab by mouth five (5) times daily for 30 days. For chronic pain  Indications: Pain Class: Print Route: Oral  
 HYDROcodone-acetaminophen (NORCO)  mg tablet 0 Starting on: 9/10/2017 Sig: Take 1 Tab by mouth five (5) times daily for 30 days. For chronic pain  Indications: Pain Class: Print Route: Oral  
 HYDROcodone-acetaminophen (NORCO)  mg tablet 0 Starting on: 8/12/2017 Sig: Take 1 Tab by mouth five (5) times daily for 30 days. For chronic pain  Indications: Pain Class: Print Route: Oral  
 butalbital-acetaminophen-caffeine (FIORICET, ESGIC) -40 mg per tablet 5 Starting on: 9/21/2017 Sig: Take 1 Tab by mouth every four (4) hours for 30 days.  Up to 6 times per day prn migraine  Indications: MIGRAINE, TENSION-TYPE HEADACHE Class: Print Route: Oral  
  
Prescriptions Sent to Pharmacy Refills  
 cyclobenzaprine (FLEXERIL) 10 mg tablet 3 Sig: TAKE 1 TABLET BY MOUTH THREE TIMES DAILY AS NEEDED FOR MUSCLE SPASM(S)--- 90 day supply  Indications: MUSCLE SPASM Class: Normal  
 Pharmacy: [facility]/pharmacy [address] [place] [address], [address] [place] [address],[address] [place] [address] Ph #: [phone] SUMAtriptan (IMITREX) 20 mg/actuation nasal spray 3 Sig: Use as directed for acute migraine ---- 90 day supply  Indications: MIGRAINE Class: Normal  
 Pharmacy: [facility]/pharmacy [address] [place] [address], [address] [place] [address],[address] [facility] Ph #: [phone] OLANZapine (ZYPREXA) 5 mg tablet 5 Sig: Take 1 Tab by mouth nightly for 30 days. Class: Normal  
 Pharmacy: [facility]/pharmacy [address] [place] [address], [address] [place] [address],[address] [place] [address] Ph #: [phone] Route: Oral  
  
Follow-up Instructions Return in about 3 months (around 10/28/2017). Patient Instructions Current health maintenance issues were reviewed and the patient was advised to followup with his/her PCP for completion of these items. Introducing [facility]! [facility] introduces [employer] patient portal. Now you can access parts of your medical record, email your doctor's office, and request medication refills online. 1. In your internet browser, go to [email or web address] 2. Click on the First Time User? Click Here link in the Sign In box. You will see the New Member Sign Up page. 3. Enter your [employer] Access Code exactly as it appears below. You will not need to use this code after youve completed the sign-up process. If you do not sign up before the expiration date, you must request a new code. · [employer] Access Code: [email or web address] Expires: 10/26/2017 10:51 AM 
 
4.  Enter the last four digits of your Social Security Number (xxxx) and Date of Birth (mm/dd/yyyy) as indicated and click Submit. You will be taken to the next sign-up page. 5. Create a Mogi ID. This will be your Mogi login ID and cannot be changed, so think of one that is secure and easy to remember. 6. Create a Mogi password. You can change your password at any time. 7. Enter your Password Reset Question and Answer. This can be used at a later time if you forget your password. 8. Enter your e-mail address. You will receive e-mail notification when new information is available in [address] 9. Click Sign Up. You can now view and download portions of your medical record. 10. Click the Download Summary menu link to download a portable copy of your medical information. If you have questions, please visit the Frequently Asked Questions section of the Mogi website. Remember, Mogi is NOT to be used for urgent needs. For medical emergencies, dial 911. Now available from your iPhone and Android! Please provide this summary of care documentation to your next provider. Your primary care clinician is listed as Arturo Talbert. If you have any questions after today's visit, please call [phone].

## 2017-07-28 NOTE — PROGRESS NOTES
HISTORY OF PRESENT ILLNESS  Tucker Yousif is a 64 y.o. female. HPI she returns for follow-up of chronic, severe pain which is widespread and polyarticular and related to generalized osteoarthritis. She also suffers from chronic daily headaches and underlying cervical and lumbar degenerative disc disease with spondylosis and cervical stenosis. She has been having increasing difficulties with pain and depression as well as anxiety. Zyprexa, 5 mg, will be added at bedtime. Pain has been under good control overall, averaging 5 out of 10. Pain level today 4 out of 10, outcome 6/28,(The lower the upper number, the better the outcome)  Physical activity and mobility as well as sleep are good, mood is good to very good. No reported side effects. A current review of the  does not identify any inconsistency. Review of Systems   Constitutional: Negative for weight loss (  ). Eyes:        Reading glasses   Respiratory: Negative. Cardiovascular: Negative. Gastrointestinal: Negative for constipation, nausea (with migraines) and vomiting. Genitourinary: Negative. Musculoskeletal: Positive for back pain, myalgias and neck pain. Skin: Negative. Neurological: Positive for tingling (hands) and headaches. Endo/Heme/Allergies:        Recent GI bleed/ulcers in January   Psychiatric/Behavioral: Negative. All other systems reviewed and are negative. Physical Exam   Constitutional: She is oriented to person, place, and time. She appears well-developed and well-nourished. No distress. HENT:   Head: Normocephalic. Eyes: EOM are normal. Pupils are equal, round, and reactive to light.   glasses   Neck: Normal range of motion. No thyromegaly present. Tender/tight musclature   Pulmonary/Chest: Effort normal.   Musculoskeletal: She exhibits tenderness (cervical/over shoulders/lumbar). She exhibits no edema. Neurological: She is alert and oriented to person, place, and time.  No cranial nerve deficit (grossly intact). Gait normal.   Patellar DTR wnl/ lower extremities-strength normal   Skin: Skin is warm and dry. Psychiatric: She has a normal mood and affect. Her behavior is normal. Judgment and thought content normal.   Nursing note and vitals reviewed. ASSESSMENT and PLAN  Encounter Diagnoses   Name Primary?  Chronic daily headache Yes    Spinal stenosis in cervical region     Degeneration of cervical intervertebral disc     Chronic pain syndrome     Encounter for long-term (current) use of high-risk medication     Degeneration of lumbar or lumbosacral intervertebral disc     Polyarthralgia     Generalized OA      Treatment plan as noted above. She will continue on her current analgesic regimen as this is providing good pain control with improve functionality and minimal side effects. 3 month reassess her    No concerns are raised for misuse, abuse, or diversion. 1. Pain medications are prescribed with the objective of pain relief and improved physical and psychosocial function in this patient. 2. Counseled patient on proper use of prescribed medications and reviewed opioid contract. 3. Counseled patient about chronic medical conditions and their relationship to anxiety and depression and recommended mental health support as needed. 4. Reviewed with patient self-help tools, home exercise, and lifestyle changes to assist the patient in self-management of symptoms. 5. Advised patient to have a primary care provider to continue care for health maintenance and general medical conditions and support for referral to specialty care as needed. 6. Reviewed with patient the treatment plan, goals of treatment plan, and limitations of treatment plan, to include the potential for side effects from medications and procedures. If side effects occur, it is the responsibility of the patient to inform the clinic so that a change in the treatment plan can be made in a safe manner.  The patient is advised that stopping prescribed medication may cause an increase in symptoms and possible medication withdrawal symptoms. The patient is informed an emergency room evaluation may be necessary if this occurs. DISPOSITION: The patients condition and plan were discussed at length and all questions were answered. The patient agrees with the plan.     Counseling occupied > 50% of visit:  Total time: 40 minutes

## 2017-08-08 DIAGNOSIS — K27.9 PEPTIC ULCER DISEASE: ICD-10-CM

## 2017-08-08 DIAGNOSIS — K21.9 GASTROESOPHAGEAL REFLUX DISEASE WITHOUT ESOPHAGITIS: ICD-10-CM

## 2017-08-15 RX ORDER — PANTOPRAZOLE SODIUM 40 MG/1
TABLET, DELAYED RELEASE ORAL
Qty: 90 TAB | Refills: 0 | Status: SHIPPED | OUTPATIENT
Start: 2017-08-15 | End: 2017-09-08 | Stop reason: SDUPTHER

## 2017-09-01 ENCOUNTER — LAB ONLY (OUTPATIENT)
Dept: INTERNAL MEDICINE CLINIC | Age: 57
End: 2017-09-01

## 2017-09-01 ENCOUNTER — HOSPITAL ENCOUNTER (OUTPATIENT)
Dept: LAB | Age: 57
Discharge: HOME | End: 2017-09-01
Payer: COMMERCIAL

## 2017-09-01 DIAGNOSIS — Z00.00 ROUTINE GENERAL MEDICAL EXAMINATION AT A HEALTH CARE FACILITY: ICD-10-CM

## 2017-09-01 DIAGNOSIS — Z00.00 ROUTINE GENERAL MEDICAL EXAMINATION AT A HEALTH CARE FACILITY: Primary | ICD-10-CM

## 2017-09-01 LAB
ALBUMIN SERPL-MCNC: 3.6 G/DL (ref 3.4–5)
ALBUMIN/GLOB SERPL: 1.2 {RATIO} (ref 0.8–1.7)
ALP SERPL-CCNC: 94 U/L (ref 45–117)
ALT SERPL-CCNC: 15 U/L (ref 13–56)
ANION GAP SERPL CALC-SCNC: 6 MMOL/L (ref 3–18)
APPEARANCE UR: CLEAR
AST SERPL-CCNC: 15 U/L (ref 15–37)
BASOPHILS # BLD: 0 K/UL (ref 0–0.06)
BASOPHILS NFR BLD: 0 % (ref 0–2)
BILIRUB SERPL-MCNC: 0.2 MG/DL (ref 0.2–1)
BILIRUB UR QL: NEGATIVE
BUN SERPL-MCNC: 15 MG/DL (ref 7–18)
BUN/CREAT SERPL: 20 (ref 12–20)
CALCIUM SERPL-MCNC: 8.5 MG/DL (ref 8.5–10.1)
CHLORIDE SERPL-SCNC: 107 MMOL/L (ref 100–108)
CHOLEST SERPL-MCNC: 263 MG/DL
CO2 SERPL-SCNC: 28 MMOL/L (ref 21–32)
COLOR UR: YELLOW
CREAT SERPL-MCNC: 0.76 MG/DL (ref 0.6–1.3)
DIFFERENTIAL METHOD BLD: ABNORMAL
EOSINOPHIL # BLD: 0.1 K/UL (ref 0–0.4)
EOSINOPHIL NFR BLD: 1 % (ref 0–5)
ERYTHROCYTE [DISTWIDTH] IN BLOOD BY AUTOMATED COUNT: 13 % (ref 11.6–14.5)
GLOBULIN SER CALC-MCNC: 3 G/DL (ref 2–4)
GLUCOSE SERPL-MCNC: 86 MG/DL (ref 74–99)
GLUCOSE UR STRIP.AUTO-MCNC: NEGATIVE MG/DL
HCT VFR BLD AUTO: 39.3 % (ref 35–45)
HDLC SERPL-MCNC: 92 MG/DL (ref 40–60)
HDLC SERPL: 2.9 {RATIO} (ref 0–5)
HGB BLD-MCNC: 12.2 G/DL (ref 12–16)
HGB UR QL STRIP: NEGATIVE
KETONES UR QL STRIP.AUTO: NEGATIVE MG/DL
LDLC SERPL CALC-MCNC: 142 MG/DL (ref 0–100)
LEUKOCYTE ESTERASE UR QL STRIP.AUTO: NEGATIVE
LIPID PROFILE,FLP: ABNORMAL
LYMPHOCYTES # BLD: 1.5 K/UL (ref 0.9–3.6)
LYMPHOCYTES NFR BLD: 30 % (ref 21–52)
MCH RBC QN AUTO: 31.5 PG (ref 24–34)
MCHC RBC AUTO-ENTMCNC: 31 G/DL (ref 31–37)
MCV RBC AUTO: 101.6 FL (ref 74–97)
MONOCYTES # BLD: 0.4 K/UL (ref 0.05–1.2)
MONOCYTES NFR BLD: 7 % (ref 3–10)
NEUTS SEG # BLD: 3.2 K/UL (ref 1.8–8)
NEUTS SEG NFR BLD: 62 % (ref 40–73)
NITRITE UR QL STRIP.AUTO: NEGATIVE
PH UR STRIP: 6 [PH] (ref 5–8)
PLATELET # BLD AUTO: 277 K/UL (ref 135–420)
PMV BLD AUTO: 9.7 FL (ref 9.2–11.8)
POTASSIUM SERPL-SCNC: 4.1 MMOL/L (ref 3.5–5.5)
PROT SERPL-MCNC: 6.6 G/DL (ref 6.4–8.2)
PROT UR STRIP-MCNC: NEGATIVE MG/DL
RBC # BLD AUTO: 3.87 M/UL (ref 4.2–5.3)
SODIUM SERPL-SCNC: 141 MMOL/L (ref 136–145)
SP GR UR REFRACTOMETRY: 1.02 (ref 1–1.03)
TRIGL SERPL-MCNC: 145 MG/DL (ref ?–150)
UROBILINOGEN UR QL STRIP.AUTO: 0.2 EU/DL (ref 0.2–1)
VLDLC SERPL CALC-MCNC: 29 MG/DL
WBC # BLD AUTO: 5.2 K/UL (ref 4.6–13.2)

## 2017-09-01 PROCEDURE — 82306 VITAMIN D 25 HYDROXY: CPT | Performed by: INTERNAL MEDICINE

## 2017-09-01 PROCEDURE — 81003 URINALYSIS AUTO W/O SCOPE: CPT | Performed by: INTERNAL MEDICINE

## 2017-09-01 PROCEDURE — 36415 COLL VENOUS BLD VENIPUNCTURE: CPT | Performed by: INTERNAL MEDICINE

## 2017-09-01 PROCEDURE — 85025 COMPLETE CBC W/AUTO DIFF WBC: CPT | Performed by: INTERNAL MEDICINE

## 2017-09-01 PROCEDURE — 80061 LIPID PANEL: CPT | Performed by: INTERNAL MEDICINE

## 2017-09-01 PROCEDURE — 80053 COMPREHEN METABOLIC PANEL: CPT | Performed by: INTERNAL MEDICINE

## 2017-09-02 LAB — 25(OH)D3 SERPL-MCNC: 99.4 NG/ML (ref 30–100)

## 2017-09-06 ENCOUNTER — TELEPHONE (OUTPATIENT)
Dept: INTERNAL MEDICINE CLINIC | Age: 57
End: 2017-09-06

## 2017-09-06 NOTE — PROGRESS NOTES
62 y.o. 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 Has been on effexor for years and was wondering if she needed a change    No GI issues as long as she takes the protonix    No  or gyn complaints, sees Dr Alejandro Kidney    Currently unable to stop smoking. We had talked about meds in past, currently vaping.   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 Maximilian Romero Allergic rhinitis     Dr. Rajan Eason Anxiety     Chronic migraine without aura     failed beta blocker, depakote, topamax per pt    Chronic pain     Dr Jazmyne Hartley    Degeneration of cervical intervertebral disc     Degeneration of lumbar intervertebral disc     Fibromyalgia syndrome     Ganglion cyst     GERD (gastroesophageal reflux disease)     Hyperlipidemia     calculated 10 year risk score 4.6% (9/15); 4.9% (9/16)    Hypovitaminosis D     PUD (peptic ulcer disease) 2011    Dr. Christine Rodriguez, duodenal w gi bleed    Spinal stenosis in cervical region     Tension headache, chronic     uses fioricet    Tobacco dependence syndrome     declined meds; able to quit on her own but not ready due to stressors     Past Surgical History:   Procedure Laterality Date    HX COLONOSCOPY      Dr Christine Rodriguez 3/28/11 neg   [facility]      7 hand surgeries, Dr. Jennifer Jaramillo    right foot surgery, Dr. Anupam Beach Marital status:      Spouse name: N/A    Number of children: 1    Years of education: N/A     Occupational History    owns [employer]      Social History Main Topics    Smoking status: Current Every Day Smoker Packs/day: 1.00     Years: 37.00    Smokeless tobacco: Never Used    Alcohol use No    Drug use: No    Sexual activity: Not on file     Other Topics Concern    Not on file     Social History Narrative     No family history on file. Immunization History   Administered Date(s) Administered    Influenza Vaccine 01/21/2013    Influenza Vaccine (Quad) PF 09/08/2017    Influenza Vaccine Whole 11/05/2009    Pneumococcal Polysaccharide (PPSV-23) 09/20/2016    TD Vaccine 06/14/2006    Tdap 02/06/2015    Zoster Vaccine, Live 05/02/2013     Current Outpatient Prescriptions   Medication Sig    pantoprazole (PROTONIX) 40 mg tablet TAKE 1 TABLET BY MOUTH ONCE DAILY    cyclobenzaprine (FLEXERIL) 10 mg tablet TAKE 1 TABLET BY MOUTH THREE TIMES DAILY AS NEEDED FOR MUSCLE SPASM(S)--- 90 day supply  Indications: MUSCLE SPASM    [START ON 10/8/2017] HYDROcodone-acetaminophen (NORCO)  mg tablet Take 1 Tab by mouth five (5) times daily for 30 days. For chronic pain  Indications: Pain    [START ON 9/21/2017] butalbital-acetaminophen-caffeine (FIORICET, ESGIC) -40 mg per tablet Take 1 Tab by mouth every four (4) hours for 30 days. Up to 6 times per day prn migraine  Indications: MIGRAINE, TENSION-TYPE HEADACHE    venlafaxine-SR (EFFEXOR-XR) 75 mg capsule TAKE 3 CAPSULES BY MOUTH ONCE DAILY  90 days    naloxone 4 mg/actuation spry 4 mg by Nasal route as needed for up to 2 doses. Indications: OPIOID TOXICITY    loratadine (CLARITIN) 10 mg tablet Take 10 mg by mouth.  SUMAtriptan (IMITREX) 100 mg tablet Take 1 Tab by mouth once as needed for Migraine for up to 1 dose. Indications: MIGRAINE    albuterol (PROVENTIL, VENTOLIN) 90 mcg/Actuation inhaler Take 2 Puffs by inhalation every six (6) hours as needed.  PSEUDOEPHEDRINE HCL (SUDAFED PO) Take  by mouth. No current facility-administered medications for this visit.       Allergies   Allergen Reactions    Aspirin Other (comments)    Nsaids (Non-Steroidal Anti-Inflammatory Drug) Other (comments)     Pt has had bleeding ulcers     REVIEW OF SYSTEMS: gyn Dr Nannette De La Vega 2015, mammo 6/15, colo 2012 Dr Mickey Gallego no vision change or eye pain  Oral  no mouth pain, tongue or tooth problems  Ears  no hearing loss, ear pain, fullness, no swallowing problems  Cardiac  no CP, PND, orthopnea, edema, palpitations or syncope  Chest  no breast masses  Resp  no wheezing, chronic coughing, dyspnea  GI  no heartburn, nausea, vomiting, change in bowel habits, bleeding, hemorrhoids  Urinary  no dysuria, hematuria, flank pain, urgency, frequency  Genitals  no genital lesions, discharge, masses, ulceration, warts  Constitutional  no wt loss, night sweats, unexplained fevers  Neuro  no focal weakness, numbness, paresthesias, incoordination, ataxia, involuntary movements  Endo - no polyuria, polydipsia, nocturia, hot flashes    Visit Vitals    /72 (BP 1 Location: Left arm, BP Patient Position: Sitting)    Pulse 89    Temp 99.4 °F (37.4 °C) (Oral)    Resp 16    Ht 5' 4\" (1.626 m)    Wt 134 lb (60.8 kg)    SpO2 99%    BMI 23 kg/m2     Affect is appropriate. Mood stable  No apparent distress  HEENT --Anicteric sclerae, tympanic membranes normal,  ear canals normal.  PERRL, EOMI, conjunctiva and lids normal.  Disks were sharp  Sinuses were nontender, turbinates normal, hearing normal.  Oropharynx without  erythema, normal tongue, oral mucosa and tonsils. No thyromegaly, JVD, or bruits. Lungs --Clear to auscultation and percussion, normal percussion. Heart --Regular rate and rhythm, no murmurs, rubs, gallops, or clicks. Chest wall --Nontender to palpation. PMI normal.  Abdomen -- Soft and nontender, no hepatosplenomegaly or masses. Extremities -- Without cyanosis, clubbing, edema. 2+ pulses equally and bilaterally.     LABS   From 2/10 showed gluc 88, cr 0.80,             alt 26, chol 255, tg 98,    hdl 68, ldl-c 168, wbc 4.5, hb 13.4, plt 233, tsh 0.92, ua neg  From 1/11 showed gluc 94, cr 0.60, gfr>60,            chol 137, tg 123, hdl 29, ldl-c 83,              ck/trop neg  From 6/13 showed gluc 95, cr 0.70, gfr>60, alt 17, chol 232, tg 170, hdl 62, ldl-c 136,        ua neg, vit d 23.4  From 9/15 showed gluc 91, cr 0.96, gfr 60,  alt<5,  chol 255, tg 103, hdl 76, ldl-c 158, wbc 6.8, hb 13.2, plt 271,     ua neg, vit d 23.0  From 9/16 showed gluc 87, cr 0.73, gfr 92,  alt 13, chol 254, tg 122, hdl 84, ldl-c 146, wbc 5.6, hb 13.0, plt 270, tsh 1.76, ua neg, hep c neg    Results for orders placed or performed during the hospital encounter of 09/01/17   CBC WITH AUTOMATED DIFF   Result Value Ref Range    WBC 5.2 4.6 - 13.2 K/uL    RBC 3.87 (L) 4.20 - 5.30 M/uL    HGB 12.2 12.0 - 16.0 g/dL    HCT 39.3 35.0 - 45.0 %    .6 (H) 74.0 - 97.0 FL    MCH 31.5 24.0 - 34.0 PG    MCHC 31.0 31.0 - 37.0 g/dL    RDW 13.0 11.6 - 14.5 %    PLATELET 880 268 - 834 K/uL    MPV 9.7 9.2 - 11.8 FL    NEUTROPHILS 62 40 - 73 %    LYMPHOCYTES 30 21 - 52 %    MONOCYTES 7 3 - 10 %    EOSINOPHILS 1 0 - 5 %    BASOPHILS 0 0 - 2 %    ABS. NEUTROPHILS 3.2 1.8 - 8.0 K/UL    ABS. LYMPHOCYTES 1.5 0.9 - 3.6 K/UL    ABS. MONOCYTES 0.4 0.05 - 1.2 K/UL    ABS. EOSINOPHILS 0.1 0.0 - 0.4 K/UL    ABS.  BASOPHILS 0.0 0.0 - 0.06 K/UL    DF AUTOMATED     LIPID PANEL   Result Value Ref Range    LIPID PROFILE          Cholesterol, total 263 (H) <200 MG/DL    Triglyceride 145 <150 MG/DL    HDL Cholesterol 92 (H) 40 - 60 MG/DL    LDL, calculated 142 (H) 0 - 100 MG/DL    VLDL, calculated 29 MG/DL    CHOL/HDL Ratio 2.9 0 - 5.0     METABOLIC PANEL, COMPREHENSIVE   Result Value Ref Range    Sodium 141 136 - 145 mmol/L    Potassium 4.1 3.5 - 5.5 mmol/L    Chloride 107 100 - 108 mmol/L    CO2 28 21 - 32 mmol/L    Anion gap 6 3.0 - 18 mmol/L    Glucose 86 74 - 99 mg/dL    BUN 15 7.0 - 18 MG/DL    Creatinine 0.76 0.6 - 1.3 MG/DL    BUN/Creatinine ratio 20 12 - 20      GFR est AA >60 >60 ml/min/1.73m2    GFR est non-AA >60 >60 ml/min/1.73m2    Calcium 8.5 8.5 - 10.1 MG/DL    Bilirubin, total 0.2 0.2 - 1.0 MG/DL    ALT (SGPT) 15 13 - 56 U/L    AST (SGOT) 15 15 - 37 U/L    Alk. phosphatase 94 45 - 117 U/L    Protein, total 6.6 6.4 - 8.2 g/dL    Albumin 3.6 3.4 - 5.0 g/dL    Globulin 3.0 2.0 - 4.0 g/dL    A-G Ratio 1.2 0.8 - 1.7     VITAMIN D, 25 HYDROXY   Result Value Ref Range    Vitamin D 25-Hydroxy 99.4 30 - 100 ng/mL   URINALYSIS W/ RFLX MICROSCOPIC   Result Value Ref Range    Color YELLOW      Appearance CLEAR      Specific gravity 1.022 1.005 - 1.030      pH (UA) 6.0 5.0 - 8.0      Protein NEGATIVE  NEG mg/dL    Glucose NEGATIVE  NEG mg/dL    Ketone NEGATIVE  NEG mg/dL    Bilirubin NEGATIVE  NEG      Blood NEGATIVE  NEG      Urobilinogen 0.2 0.2 - 1.0 EU/dL    Nitrites NEGATIVE  NEG      Leukocyte Esterase NEGATIVE  NEG       Patient Active Problem List   Diagnosis Code    Hyperlipidemia E78.5    Hypovitaminosis D E55.9    Chronic pain syndrome G89.4    Encounter for long-term (current) use of high-risk medication Z79.899    Spinal stenosis in cervical region M48.02    Degeneration of cervical intervertebral disc M50.30    Degeneration of lumbar or lumbosacral intervertebral disc M51.37    Muscle spasms of head and/or neck M62.838    Chronic daily headache R51    Polyarthralgia M25.50    Generalized OA M15.9    Tobacco dependence syndrome F17.200    Peptic ulcer disease K27.9    Anxiety F41.9     Assessment and plan:  1. Allergic rhinitis. Prn meds  2. Chronic pain syndrome. F/U Dr Jed Boston or replacement  3. Anxiety. Continue current, we discussed sending her to psych (Dr Tessy Sin) but she declined for now  4. Hyperlipidemia. Dietary and lifestyle measures   5. H/O PU and GERD. PPI and avoidance measures indefinitely  6. General.  F/U Dr Kary Skiff   7. Smoking cessation meds declined, total time 3 min  8. Hypovit d. Stop supp  9. mammo ordered   10.   Flu given        RTC 9/18    Above conditions discussed at length and patient vocalized understanding.   All questions answered to patient satifaction

## 2017-09-08 ENCOUNTER — OFFICE VISIT (OUTPATIENT)
Dept: INTERNAL MEDICINE CLINIC | Age: 57
End: 2017-09-08

## 2017-09-08 VITALS
WEIGHT: 134 LBS | BODY MASS INDEX: 22.88 KG/M2 | SYSTOLIC BLOOD PRESSURE: 128 MMHG | RESPIRATION RATE: 16 BRPM | HEART RATE: 89 BPM | HEIGHT: 64 IN | DIASTOLIC BLOOD PRESSURE: 72 MMHG | TEMPERATURE: 99.4 F | OXYGEN SATURATION: 99 %

## 2017-09-08 DIAGNOSIS — Z00.00 PHYSICAL EXAM: Primary | ICD-10-CM

## 2017-09-08 DIAGNOSIS — K21.9 GASTROESOPHAGEAL REFLUX DISEASE WITHOUT ESOPHAGITIS: ICD-10-CM

## 2017-09-08 DIAGNOSIS — R51.9 CHRONIC DAILY HEADACHE: ICD-10-CM

## 2017-09-08 DIAGNOSIS — Z23 ENCOUNTER FOR IMMUNIZATION: ICD-10-CM

## 2017-09-08 DIAGNOSIS — F41.9 ANXIETY: ICD-10-CM

## 2017-09-08 DIAGNOSIS — Z12.31 SCREENING MAMMOGRAM, ENCOUNTER FOR: ICD-10-CM

## 2017-09-08 DIAGNOSIS — M48.02 SPINAL STENOSIS IN CERVICAL REGION: ICD-10-CM

## 2017-09-08 DIAGNOSIS — G89.4 CHRONIC PAIN SYNDROME: ICD-10-CM

## 2017-09-08 DIAGNOSIS — E55.9 HYPOVITAMINOSIS D: ICD-10-CM

## 2017-09-08 DIAGNOSIS — F17.200 TOBACCO DEPENDENCE SYNDROME: ICD-10-CM

## 2017-09-08 DIAGNOSIS — E78.5 HYPERLIPIDEMIA, UNSPECIFIED HYPERLIPIDEMIA TYPE: ICD-10-CM

## 2017-09-08 DIAGNOSIS — K27.9 PEPTIC ULCER DISEASE: ICD-10-CM

## 2017-09-08 RX ORDER — PANTOPRAZOLE SODIUM 40 MG/1
TABLET, DELAYED RELEASE ORAL
Qty: 90 TAB | Refills: 3 | Status: SHIPPED | OUTPATIENT
Start: 2017-09-08 | End: 2020-06-10 | Stop reason: DRUGHIGH

## 2017-09-08 RX ORDER — ERGOCALCIFEROL 1.25 MG/1
50000 CAPSULE ORAL
COMMUNITY
End: 2017-09-08

## 2017-09-08 RX ORDER — ERGOCALCIFEROL 1.25 MG/1
50000 CAPSULE ORAL
Qty: 12 CAP | Refills: 8 | Status: CANCELLED | OUTPATIENT
Start: 2017-09-08

## 2017-09-08 NOTE — MR AVS SNAPSHOT
Visit Information Date & Time Provider Department Dept. Phone Encounter #  
 9/8/2017 10:30 AM Rigo Abarca MD Internist of [address] [place] [address] [phone] [id number] Your Appointments 10/20/2017  2:20 PM  
Follow Up with Jack Dale MD  
[address] for Pain Management [facility]) Appt Note: Friday, October 20th 2017 @ 2:20PM.  
 [address] [address]  
[phone]  [id number] Upcoming Health Maintenance Date Due  
 PAP AKA CERVICAL CYTOLOGY 9/1/1981 BREAST CANCER SCRN MAMMOGRAM 6/30/2017 INFLUENZA AGE 9 TO ADULT 8/1/2017 COLONOSCOPY 6/1/2021 DTaP/Tdap/Td series (2 - Td) 2/6/2025 Allergies as of 9/8/2017  Review Complete On: 9/8/2017 By: Cristal Patient Severity Noted Reaction Type Reactions Aspirin  10/20/2011   Side Effect Other (comments) Nsaids (Non-steroidal Anti-inflammatory Drug)  07/28/2011   Intolerance Other (comments) Pt has had bleeding ulcers Current Immunizations  Reviewed on 9/15/2016 Name Date Influenza Vaccine 1/21/2013 Influenza Vaccine Whole 11/5/2009 Pneumococcal Polysaccharide (PPSV-23) 9/20/2016 TD Vaccine 6/14/2006 Tdap 2/6/2015  1:43 PM  
 Zoster Vaccine, Live 5/2/2013 Not reviewed this visit You Were Diagnosed With   
  
 Codes Comments Screening mammogram, encounter for    -  Primary ICD-10-CM: Z12.31 
ICD-9-CM: V76.12 Vitals BP Pulse Temp Resp Height(growth percentile) Weight(growth percentile) 128/72 (BP 1 Location: Left arm, BP Patient Position: Sitting) 89 99.4 °F (37.4 °C) (Oral) 16 5' 4\" (1.626 m) 134 lb (60.8 kg) SpO2 BMI OB Status Smoking Status 99% 23 kg/m2 Postmenopausal Current Every Day Smoker Vitals History BMI and BSA Data Body Mass Index Body Surface Area  
 23 kg/m 2 1.66 m 2 Preferred Pharmacy Pharmacy Name Phone [facility]Y #[id number] Marissa Bob, [facility] [facility] [phone] Your Updated Medication List  
  
   
This list is accurate as of: 9/8/17 11:31 AM.  Always use your most recent med list.  
  
  
  
  
 albuterol 90 mcg/actuation inhaler Commonly known as:  Maebelle Metro Take 2 Puffs by inhalation every six (6) hours as needed. butalbital-acetaminophen-caffeine -40 mg per tablet Commonly known as:  Rach Marietta Take 1 Tab by mouth every four (4) hours for 30 days. Up to 6 times per day prn migraine  Indications: MIGRAINE, TENSION-TYPE HEADACHE Start taking on:  9/21/2017  
  
 cyclobenzaprine 10 mg tablet Commonly known as:  FLEXERIL  
TAKE 1 TABLET BY MOUTH THREE TIMES DAILY AS NEEDED FOR MUSCLE SPASM(S)--- 90 day supply  Indications: MUSCLE SPASM HYDROcodone-acetaminophen  mg tablet Commonly known as:  Beckham Plum Take 1 Tab by mouth five (5) times daily for 30 days. For chronic pain  Indications: Pain Start taking on:  10/8/2017  
  
 loratadine 10 mg tablet Commonly known as:  Cheryl Case Take 10 mg by mouth.  
  
 naloxone 4 mg/actuation nasal spray Commonly known as:  NARCAN  
4 mg by Nasal route as needed for up to 2 doses. Indications: OPIOID TOXICITY  
  
 pantoprazole 40 mg tablet Commonly known as:  PROTONIX  
TAKE 1 TABLET BY MOUTH ONCE DAILY SUDAFED PO Take  by mouth. SUMAtriptan 100 mg tablet Commonly known as:  IMITREX Take 1 Tab by mouth once as needed for Migraine for up to 1 dose. Indications: MIGRAINE  
  
 venlafaxine-SR 75 mg capsule Commonly known as:  EFFEXOR-XR  
TAKE 3 CAPSULES BY MOUTH ONCE DAILY  90 days VITAMIN D2 50,000 unit capsule Generic drug:  ergocalciferol Take 50,000 Units by mouth every seven (7) days. To-Do List   
 09/06/2017   Imaging:  [facility]ITA MAMMO BI SCREENING INCL CAD   
  
 09/26/2017 2:15 PM  
  Appointment with VERA PORRAS RM 2 at [facility] ([phone]) OUTSIDE FILMS  - Any outside films related to the study being scheduled should be brought with you on the day of the exam.  If this cannot be done there may be a delay in the reading of the study. MEDICATIONS  - Patient must bring a complete list of all medications currently taking to include prescriptions, over-the-counter meds, herbals, vitamins & any dietary supplements  GENERAL INSTRUCTIONS  - On the day of your exam do not use any bath powder, deodorant or lotions on the armpit area. -Tenderness of breasts may cause an increase of discomfort during procedure. If you are experiencing breast tenderness on the day of your appointment and would like to reschedule, please call [phone]. Introducing \[facility] [place] [facility]\"" & HEALTH SERVICES! [address] introduces [employer] patient portal. Now you can access parts of your medical record, email your doctor's office, and request medication refills online. 1. In your internet browser, go to [email or web address] 2. Click on the First Time User? Click Here link in the Sign In box. You will see the New Member Sign Up page. 3. Enter your [employer] Access Code exactly as it appears below. You will not need to use this code after youve completed the sign-up process. If you do not sign up before the expiration date, you must request a new code. · [employer] Access Code: [email or web address] Expires: 10/26/2017 10:51 AM 
 
4. Enter the last four digits of your Social Security Number (xxxx) and Date of Birth (mm/dd/yyyy) as indicated and click Submit. You will be taken to the next sign-up page. 5. Create a Prime[employer] ID. This will be your [employer] login ID and cannot be changed, so think of one that is secure and easy to remember. 6. Create a [employer] password. You can change your password at any time. 7. Enter your Password Reset Question and Answer. This can be used at a later time if you forget your password. 8. Enter your e-mail address. You will receive e-mail notification when new information is available in [address] 9. Click Sign Up. You can now view and download portions of your medical record. 10. Click the Download Summary menu link to download a portable copy of your medical information. If you have questions, please visit the Frequently Asked Questions section of the [employer] website. Remember, [employer] is NOT to be used for urgent needs. For medical emergencies, dial 911. Now available from your iPhone and Android! Please provide this summary of care documentation to your next provider. Your primary care clinician is listed as Mendel Asai. If you have any questions after today's visit, please call [phone].

## 2017-09-08 NOTE — PROGRESS NOTES
1. Have you been to the ER, urgent care clinic or hospitalized since your last visit? NO.     2. Have you seen or consulted any other health care providers outside of the [address] [place] [address] since your last visit (Include any pap smears or colon screening)? NO      Do you have an Advanced Directive?  YES

## 2017-09-21 ENCOUNTER — DOCUMENTATION ONLY (OUTPATIENT)
Dept: INTERNAL MEDICINE CLINIC | Age: 57
End: 2017-09-21

## 2017-09-26 ENCOUNTER — HOSPITAL ENCOUNTER (OUTPATIENT)
Dept: MAMMOGRAPHY | Age: 57
Discharge: HOME | End: 2017-09-26
Attending: INTERNAL MEDICINE
Payer: COMMERCIAL

## 2017-09-26 DIAGNOSIS — Z12.31 SCREENING MAMMOGRAM, ENCOUNTER FOR: ICD-10-CM

## 2017-09-26 PROCEDURE — 77067 SCR MAMMO BI INCL CAD: CPT

## 2017-09-26 PROCEDURE — 77063 BREAST TOMOSYNTHESIS BI: CPT

## 2017-10-20 DIAGNOSIS — M48.02 SPINAL STENOSIS IN CERVICAL REGION: ICD-10-CM

## 2017-10-20 DIAGNOSIS — F41.9 ANXIETY: ICD-10-CM

## 2017-10-20 DIAGNOSIS — M50.30 DEGENERATION OF CERVICAL INTERVERTEBRAL DISC: ICD-10-CM

## 2017-10-20 NOTE — TELEPHONE ENCOUNTER
The pt called the office to request refills on her medications. She was called and notified that her November appt was cancelled. She is tolerating her medications and will need meds before she runs out. A  was obtained and there were no aberrancies noted. She last filled her ativan on 10/10/17 and her norco on 09/28/17. Pt should have one unfilled prescription. She is also requesting a refill in her imitrex oral medication. They are no longer making the nasal spray. She needs buspar as well. The meds will be sent over to a provider for review. She last received a prescription for her buspar October 2016 with 11 refills. Pt is actively looking for a new provider for future care.

## 2017-10-24 RX ORDER — BUSPIRONE HYDROCHLORIDE 30 MG/1
30 TABLET ORAL 2 TIMES DAILY
Qty: 180 TAB | Refills: 0 | Status: SHIPPED | OUTPATIENT
Start: 2017-10-24 | End: 2018-01-22

## 2017-10-24 RX ORDER — LORAZEPAM 1 MG/1
1 TABLET ORAL
Qty: 60 TAB | Refills: 0 | Status: SHIPPED | OUTPATIENT
Start: 2017-10-24 | End: 2017-11-23

## 2017-10-24 RX ORDER — SUMATRIPTAN 100 MG/1
100 TABLET, FILM COATED ORAL
Qty: 9 TAB | Refills: 0 | Status: SHIPPED | OUTPATIENT
Start: 2017-10-24

## 2017-10-31 ENCOUNTER — TELEPHONE (OUTPATIENT)
Dept: PAIN MANAGEMENT | Age: 57
End: 2017-10-31

## 2018-06-18 ENCOUNTER — TELEPHONE (OUTPATIENT)
Dept: INTERNAL MEDICINE CLINIC | Age: 58
End: 2018-06-18

## 2018-06-18 NOTE — TELEPHONE ENCOUNTER
Patient called to see if you had an opening now because she is on the way to  [facility] with excruciating pain in her legs. She wanted you to be aware of it.

## 2019-01-15 ENCOUNTER — TELEPHONE (OUTPATIENT)
Dept: INTERNAL MEDICINE CLINIC | Age: 59
End: 2019-01-15

## 2019-01-15 DIAGNOSIS — N63.0 BREAST LUMP: Primary | ICD-10-CM

## 2019-01-16 NOTE — TELEPHONE ENCOUNTER
Diagnostic mammogram has been ordered as patient has lump/bump palpable in between breast tissue and they are requesting dx mammo.

## 2019-01-16 NOTE — TELEPHONE ENCOUNTER
Called pt- Her last mammo was 09/06/2017, she received a letter from [facility] that she is due for a mammo when she was speaking to the  she mentioned she has a lump/bump in between her breast so they advised her she will need a diagnostic mammo order.

## 2019-01-23 ENCOUNTER — HOSPITAL ENCOUNTER (OUTPATIENT)
Dept: LAB | Age: 59
Discharge: HOME | End: 2019-01-23
Payer: COMMERCIAL

## 2019-01-23 ENCOUNTER — LAB ONLY (OUTPATIENT)
Dept: INTERNAL MEDICINE CLINIC | Age: 59
End: 2019-01-23

## 2019-01-23 DIAGNOSIS — G89.4 CHRONIC PAIN SYNDROME: ICD-10-CM

## 2019-01-23 DIAGNOSIS — Z00.00 ROUTINE GENERAL MEDICAL EXAMINATION AT A HEALTH CARE FACILITY: ICD-10-CM

## 2019-01-23 DIAGNOSIS — E55.9 HYPOVITAMINOSIS D: ICD-10-CM

## 2019-01-23 DIAGNOSIS — E78.5 HYPERLIPIDEMIA, UNSPECIFIED HYPERLIPIDEMIA TYPE: Primary | ICD-10-CM

## 2019-01-23 DIAGNOSIS — E78.5 HYPERLIPIDEMIA, UNSPECIFIED HYPERLIPIDEMIA TYPE: ICD-10-CM

## 2019-01-23 LAB
ALBUMIN SERPL-MCNC: 4.1 G/DL (ref 3.4–5)
ALBUMIN/GLOB SERPL: 1.3 {RATIO} (ref 0.8–1.7)
ALP SERPL-CCNC: 100 U/L (ref 45–117)
ALT SERPL-CCNC: 12 U/L (ref 13–56)
ANION GAP SERPL CALC-SCNC: 8 MMOL/L (ref 3–18)
AST SERPL-CCNC: 13 U/L (ref 15–37)
BASOPHILS # BLD: 0 K/UL (ref 0–0.1)
BASOPHILS NFR BLD: 0 % (ref 0–2)
BILIRUB SERPL-MCNC: 0.2 MG/DL (ref 0.2–1)
BUN SERPL-MCNC: 17 MG/DL (ref 7–18)
BUN/CREAT SERPL: 19 (ref 12–20)
CALCIUM SERPL-MCNC: 9.4 MG/DL (ref 8.5–10.1)
CHLORIDE SERPL-SCNC: 105 MMOL/L (ref 100–108)
CHOLEST SERPL-MCNC: 275 MG/DL
CO2 SERPL-SCNC: 27 MMOL/L (ref 21–32)
CREAT SERPL-MCNC: 0.88 MG/DL (ref 0.6–1.3)
DIFFERENTIAL METHOD BLD: ABNORMAL
EOSINOPHIL # BLD: 0.1 K/UL (ref 0–0.4)
EOSINOPHIL NFR BLD: 2 % (ref 0–5)
ERYTHROCYTE [DISTWIDTH] IN BLOOD BY AUTOMATED COUNT: 13.8 % (ref 11.6–14.5)
GLOBULIN SER CALC-MCNC: 3.1 G/DL (ref 2–4)
GLUCOSE SERPL-MCNC: 101 MG/DL (ref 74–99)
HCT VFR BLD AUTO: 39.1 % (ref 35–45)
HDLC SERPL-MCNC: 76 MG/DL (ref 40–60)
HDLC SERPL: 3.6 {RATIO} (ref 0–5)
HGB BLD-MCNC: 11.8 G/DL (ref 12–16)
LDLC SERPL CALC-MCNC: 162.4 MG/DL (ref 0–100)
LIPID PROFILE,FLP: ABNORMAL
LYMPHOCYTES # BLD: 1.7 K/UL (ref 0.9–3.6)
LYMPHOCYTES NFR BLD: 32 % (ref 21–52)
MCH RBC QN AUTO: 29.3 PG (ref 24–34)
MCHC RBC AUTO-ENTMCNC: 30.2 G/DL (ref 31–37)
MCV RBC AUTO: 97 FL (ref 74–97)
MONOCYTES # BLD: 0.3 K/UL (ref 0.05–1.2)
MONOCYTES NFR BLD: 6 % (ref 3–10)
NEUTS SEG # BLD: 3.2 K/UL (ref 1.8–8)
NEUTS SEG NFR BLD: 60 % (ref 40–73)
PLATELET # BLD AUTO: 352 K/UL (ref 135–420)
PMV BLD AUTO: 10.1 FL (ref 9.2–11.8)
POTASSIUM SERPL-SCNC: 4.4 MMOL/L (ref 3.5–5.5)
PROT SERPL-MCNC: 7.2 G/DL (ref 6.4–8.2)
RBC # BLD AUTO: 4.03 M/UL (ref 4.2–5.3)
SODIUM SERPL-SCNC: 140 MMOL/L (ref 136–145)
TRIGL SERPL-MCNC: 183 MG/DL (ref ?–150)
VLDLC SERPL CALC-MCNC: 36.6 MG/DL
WBC # BLD AUTO: 5.3 K/UL (ref 4.6–13.2)

## 2019-01-23 PROCEDURE — 85025 COMPLETE CBC W/AUTO DIFF WBC: CPT

## 2019-01-23 PROCEDURE — 80053 COMPREHEN METABOLIC PANEL: CPT

## 2019-01-23 PROCEDURE — 36415 COLL VENOUS BLD VENIPUNCTURE: CPT

## 2019-01-23 PROCEDURE — 82306 VITAMIN D 25 HYDROXY: CPT

## 2019-01-23 PROCEDURE — 80061 LIPID PANEL: CPT

## 2019-01-24 LAB — 25(OH)D3 SERPL-MCNC: 25.7 NG/ML (ref 30–100)

## 2019-01-27 NOTE — PROGRESS NOTES
62 y.o. WHITE OR  female who presents for RPE    She continues to go to Dr Gayle Fong for pain mgmt. she had a bad flare of the lumbar radiculitis, went to [facility] ER 5 months ago and did improve on prednisone taper. 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 Also in the process of trying to sell their house to [employer] as everybody has moved out. Remains under a lot of stress with the business, mom's deteriorating condition, daughter's illness. Continues on effexor    No GI,  or gyn complaints    Currently unable to stop smoking, declined meds previously. No wheezing although she has intermittent coughing    She has had URI symptoms for about 3 weeks now, just not clearing up even with OTC medications.     Past Medical History:   Diagnosis Date    Acne     Dr. Su Both Allergic rhinitis     Dr. Geroge Meigs; has not tried other ssris    Chronic migraine without aura     failed beta blocker, depakote, topamax per pt    Chronic pain     Dr Gayle Fong    Degeneration of cervical intervertebral disc     Degeneration of lumbar intervertebral disc     Dr Claudean Davenport 2011 showed degen changes L1-L2 and L3-L4, mild to mod bilat foraminal narrowing    Fibromyalgia syndrome     Ganglion cyst     GERD (gastroesophageal reflux disease)     Hyperlipidemia     calculated 10 year risk score 4.6% (9/15); 4.9% (9/16); 7.1% (1/19)    Hypovitaminosis D     PUD (peptic ulcer disease) 2011    Dr. Cecilia Ward, duodenal w gi bleed    Spinal stenosis in cervical region     Tension headache, chronic     uses fioricet    Tobacco dependence syndrome     declined meds; able to quit on her own but not ready due to stressors     Past Surgical History:   Procedure Laterality Date    HX COLONOSCOPY      Dr Cecilia Ward 3/28/11 neg    HX ORTHOPAEDIC      7 hand surgeries, Dr. Corrine Coreas    right foot surgery, Dr. Shae Rojo Marital status:      Spouse name: Not on file    Number of children: 1    Years of education: Not on file    Highest education level: Not on file   Social Needs    Financial resource strain: Not on file    Food insecurity - worry: Not on file    Food insecurity - inability: Not on file   [place] needs - medical: Not on file   [employer] needs - non-medical: Not on file   Occupational History    Occupation: owns [employer]   Tobacco Use    Smoking status: Current Every Day Smoker     Packs/day: 1.00     Years: 37.00     Pack years: 37.00    Smokeless tobacco: Never Used   Substance and Sexual Activity    Alcohol use: No    Drug use: No    Sexual activity: Not on file   Other Topics Concern    Not on file   Social History Narrative    Not on file     History reviewed. No pertinent family history. Immunization History   Administered Date(s) Administered    Influenza Vaccine 01/21/2013    Influenza Vaccine (Quad) PF 09/08/2017    Influenza Vaccine Whole 11/05/2009    Pneumococcal Polysaccharide (PPSV-23) 09/20/2016    TD Vaccine 06/14/2006    Tdap 02/06/2015    Zoster Vaccine, Live 05/02/2013     Current Outpatient Medications   Medication Sig    ergocalciferol (ERGOCALCIFEROL) 50,000 unit capsule Take 1 Cap by mouth every thirty (30) days.  azithromycin (ZITHROMAX) 250 mg tablet Take 2 tablets today, then take 1 tablet daily    SUMAtriptan (IMITREX) 100 mg tablet Take 1 Tab by mouth once as needed for Migraine for up to 1 dose. Indications: Migraine    pantoprazole (PROTONIX) 40 mg tablet TAKE 1 TABLET BY MOUTH ONCE DAILY    cyclobenzaprine (FLEXERIL) 10 mg tablet TAKE 1 TABLET BY MOUTH THREE TIMES DAILY AS NEEDED FOR MUSCLE SPASM(S)--- 90 day supply  Indications:  MUSCLE SPASM    venlafaxine-SR (EFFEXOR-XR) 75 mg capsule TAKE 3 CAPSULES BY MOUTH ONCE DAILY  90 days    loratadine (CLARITIN) 10 mg tablet Take 10 mg by mouth.  albuterol (PROVENTIL, VENTOLIN) 90 mcg/Actuation inhaler Take 2 Puffs by inhalation every six (6) hours as needed.  PSEUDOEPHEDRINE HCL (SUDAFED PO) Take  by mouth.  naloxone 4 mg/actuation spry 4 mg by Nasal route as needed for up to 2 doses. Indications: OPIOID TOXICITY     No current facility-administered medications for this visit. Allergies   Allergen Reactions    Aspirin Other (comments)    Nsaids (Non-Steroidal Anti-Inflammatory Drug) Other (comments)     Pt has had bleeding ulcers     REVIEW OF SYSTEMS: gyn Dr Kelsey Mir 2016?, mammo 9/17, colo 2012 Dr Isidra Blas  Ophtho - no vision change or eye pain  Oral - no mouth pain, tongue or tooth problems  Ears - no hearing loss, ear pain, fullness, no swallowing problems  Cardiac - no CP, PND, orthopnea, edema, palpitations or syncope  Chest - no breast masses  Resp - no wheezing, chronic coughing, dyspnea  GI - no heartburn, nausea, vomiting, change in bowel habits, bleeding, hemorrhoids  Urinary - no dysuria, hematuria, flank pain, urgency, frequency  Genitals - no genital lesions, discharge, masses, ulceration, warts  Constitutional - no wt loss, night sweats, unexplained fevers  Neuro - no focal weakness, numbness, paresthesias, incoordination, ataxia, involuntary movements  Endo - no polyuria, polydipsia, nocturia, hot flashes    Visit Vitals  /84   Pulse 86   Temp 99.3 °F (37.4 °C) (Oral)   Resp 14   Ht 5' 4\" (1.626 m)   Wt 129 lb (58.5 kg)   SpO2 98%   BMI 22.14 kg/m²     Affect is appropriate. Mood stable  No apparent distress  HEENT --Anicteric sclerae, tympanic membranes normal,  ear canals normal.  PERRL, EOMI, conjunctiva and lids normal.  Disks were sharp  Sinuses were nontender, turbinates normal, hearing normal.  Oropharynx without  erythema, normal tongue, oral mucosa and tonsils. No thyromegaly, JVD, or bruits.     Lungs --Clear to auscultation and percussion, normal percussion. Heart --Regular rate and rhythm, no murmurs, rubs, gallops, or clicks. Chest wall --Nontender to palpation. PMI normal.  Probable sebaceous cyst in the left inner upper quadrant breast  Abdomen -- Soft and nontender, no hepatosplenomegaly or masses. Extremities -- Without cyanosis, clubbing, edema. 2+ pulses equally and bilaterally. LABS   From 2/10 showed gluc 88, cr 0.80,             alt 26, chol 255, tg 98,    hdl 68, ldl-c 168, wbc 4.5, hb 13.4, plt 233, tsh 0.92, ua neg  From 1/11 showed gluc 94, cr 0.60, gfr>60,            chol 137, tg 123, hdl 29, ldl-c 83,              ck/trop neg  From 6/13 showed gluc 95, cr 0.70, gfr>60, alt 17, chol 232, tg 170, hdl 62, ldl-c 136,        ua neg, vit d 23.4  From 9/15 showed gluc 91, cr 0.96, gfr 60,  alt<5,  chol 255, tg 103, hdl 76, ldl-c 158, wbc 6.8, hb 13.2, plt 271,     ua neg, vit d 23.0  From 9/16 showed gluc 87, cr 0.73, gfr 92,  alt 13, chol 254, tg 122, hdl 84, ldl-c 146, wbc 5.6, hb 13.0, plt 270, tsh 1.76, ua neg, hep c neg  From 9/17 showed gluc 86, cr 0.76, gfr>60, alt 15, chol 263, tg 145, hdl 92, ldl-c 142, wbc 5.2, hb 12.2, plt 277,     ua neg, vit d 99.4    Results for orders placed or performed during the hospital encounter of 01/23/19   CBC WITH AUTOMATED DIFF   Result Value Ref Range    WBC 5.3 4.6 - 13.2 K/uL    RBC 4.03 (L) 4.20 - 5.30 M/uL    HGB 11.8 (L) 12.0 - 16.0 g/dL    HCT 39.1 35.0 - 45.0 %    MCV 97.0 74.0 - 97.0 FL    MCH 29.3 24.0 - 34.0 PG    MCHC 30.2 (L) 31.0 - 37.0 g/dL    RDW 13.8 11.6 - 14.5 %    PLATELET 991 039 - 413 K/uL    MPV 10.1 9.2 - 11.8 FL    NEUTROPHILS 60 40 - 73 %    LYMPHOCYTES 32 21 - 52 %    MONOCYTES 6 3 - 10 %    EOSINOPHILS 2 0 - 5 %    BASOPHILS 0 0 - 2 %    ABS. NEUTROPHILS 3.2 1.8 - 8.0 K/UL    ABS. LYMPHOCYTES 1.7 0.9 - 3.6 K/UL    ABS. MONOCYTES 0.3 0.05 - 1.2 K/UL    ABS. EOSINOPHILS 0.1 0.0 - 0.4 K/UL    ABS.  BASOPHILS 0.0 0.0 - 0.1 K/UL    DF AUTOMATED     METABOLIC PANEL, COMPREHENSIVE   Result Value Ref Range    Sodium 140 136 - 145 mmol/L    Potassium 4.4 3.5 - 5.5 mmol/L    Chloride 105 100 - 108 mmol/L    CO2 27 21 - 32 mmol/L    Anion gap 8 3.0 - 18 mmol/L    Glucose 101 (H) 74 - 99 mg/dL    BUN 17 7.0 - 18 MG/DL    Creatinine 0.88 0.6 - 1.3 MG/DL    BUN/Creatinine ratio 19 12 - 20      GFR est AA >60 >60 ml/min/1.73m2    GFR est non-AA >60 >60 ml/min/1.73m2    Calcium 9.4 8.5 - 10.1 MG/DL    Bilirubin, total 0.2 0.2 - 1.0 MG/DL    ALT (SGPT) 12 (L) 13 - 56 U/L    AST (SGOT) 13 (L) 15 - 37 U/L    Alk. phosphatase 100 45 - 117 U/L    Protein, total 7.2 6.4 - 8.2 g/dL    Albumin 4.1 3.4 - 5.0 g/dL    Globulin 3.1 2.0 - 4.0 g/dL    A-G Ratio 1.3 0.8 - 1.7     LIPID PANEL   Result Value Ref Range    LIPID PROFILE          Cholesterol, total 275 (H) <200 MG/DL    Triglyceride 183 (H) <150 MG/DL    HDL Cholesterol 76 (H) 40 - 60 MG/DL    LDL, calculated 162.4 (H) 0 - 100 MG/DL    VLDL, calculated 36.6 MG/DL    CHOL/HDL Ratio 3.6 0 - 5.0     VITAMIN D, 25 HYDROXY   Result Value Ref Range    Vitamin D 25-Hydroxy 25.7 (L) 30 - 100 ng/mL     Calculated 10 year risk score was 7.1 %    Patient Active Problem List   Diagnosis Code    Hyperlipidemia E78.5    Hypovitaminosis D E55.9    Chronic pain syndrome G89.4    Degeneration of lumbar or lumbosacral intervertebral disc M51.37    Polyarthralgia M25.50    Generalized OA M15.9    Tobacco dependence syndrome F17.200    Peptic ulcer disease K27.9    Anxiety F41.9     Assessment and plan:  1. Allergic rhinitis. Prn meds  2. Chronic pain syndrome. F/U Dr Jed Boston   3. Anxiety. Continue current  4. Hyperlipidemia. Dietary and lifestyle measures   5. H/O PU and GERD. PPI and avoidance measures indefinitely  6. General.  F/U gyn, mammo tomorrow  7. Smoking cessation meds declined  8. Hypovit d. Restart monthly supp  9. URI. Zpak, otc meds  10. Elev fbs. Recheck in future.  Lifestyle and dietary measures        RTC 1/20    Above conditions discussed at length and patient vocalized understanding.   All questions answered to patient satisfaction

## 2019-01-31 ENCOUNTER — OFFICE VISIT (OUTPATIENT)
Dept: INTERNAL MEDICINE CLINIC | Age: 59
End: 2019-01-31

## 2019-01-31 ENCOUNTER — TELEPHONE (OUTPATIENT)
Dept: INTERNAL MEDICINE CLINIC | Age: 59
End: 2019-01-31

## 2019-01-31 VITALS
OXYGEN SATURATION: 98 % | SYSTOLIC BLOOD PRESSURE: 138 MMHG | BODY MASS INDEX: 22.02 KG/M2 | HEART RATE: 86 BPM | RESPIRATION RATE: 14 BRPM | WEIGHT: 129 LBS | DIASTOLIC BLOOD PRESSURE: 84 MMHG | HEIGHT: 64 IN | TEMPERATURE: 99.3 F

## 2019-01-31 DIAGNOSIS — G89.4 CHRONIC PAIN SYNDROME: ICD-10-CM

## 2019-01-31 DIAGNOSIS — Z00.00 PHYSICAL EXAM: Primary | ICD-10-CM

## 2019-01-31 DIAGNOSIS — R73.01 IFG (IMPAIRED FASTING GLUCOSE): ICD-10-CM

## 2019-01-31 DIAGNOSIS — N63.0 BREAST LUMP: Primary | ICD-10-CM

## 2019-01-31 DIAGNOSIS — J40 BRONCHITIS: ICD-10-CM

## 2019-01-31 DIAGNOSIS — E55.9 HYPOVITAMINOSIS D: ICD-10-CM

## 2019-01-31 DIAGNOSIS — F41.9 ANXIETY: ICD-10-CM

## 2019-01-31 DIAGNOSIS — F17.200 TOBACCO DEPENDENCE SYNDROME: ICD-10-CM

## 2019-01-31 DIAGNOSIS — M15.9 GENERALIZED OA: ICD-10-CM

## 2019-01-31 DIAGNOSIS — E78.5 HYPERLIPIDEMIA, UNSPECIFIED HYPERLIPIDEMIA TYPE: ICD-10-CM

## 2019-01-31 RX ORDER — ERGOCALCIFEROL 1.25 MG/1
50000 CAPSULE ORAL
Qty: 3 CAP | Refills: 3 | Status: SHIPPED | OUTPATIENT
Start: 2019-01-31 | End: 2021-01-14

## 2019-01-31 RX ORDER — AZITHROMYCIN 250 MG/1
TABLET, FILM COATED ORAL
Qty: 6 TAB | Refills: 0 | Status: SHIPPED | OUTPATIENT
Start: 2019-01-31 | End: 2019-04-02

## 2019-01-31 NOTE — PROGRESS NOTES
1. Have you been to the ER, urgent care clinic or hospitalized since your last visit? YES    2. Have you seen or consulted any other health care providers outside of the [address] [place] [address] since your last visit (Include any pap smears or colon screening)?  NO        Chief Complaint   Patient presents with    Physical     labs

## 2019-01-31 NOTE — TELEPHONE ENCOUNTER
Nancy hernandez calling, needs order put in for breast ultra sound. Pt going tomorrow for additional testing. Says lump in between breast so just put in as breast ultra sound.

## 2019-02-01 ENCOUNTER — HOSPITAL ENCOUNTER (OUTPATIENT)
Dept: ULTRASOUND IMAGING | Age: 59
Discharge: HOME | End: 2019-02-01
Attending: INTERNAL MEDICINE
Payer: COMMERCIAL

## 2019-02-01 ENCOUNTER — HOSPITAL ENCOUNTER (OUTPATIENT)
Dept: MAMMOGRAPHY | Age: 59
Discharge: HOME | End: 2019-02-01
Attending: INTERNAL MEDICINE
Payer: COMMERCIAL

## 2019-02-01 DIAGNOSIS — N63.0 BREAST LUMP: ICD-10-CM

## 2019-02-01 PROCEDURE — 77066 DX MAMMO INCL CAD BI: CPT

## 2019-02-01 PROCEDURE — 76642 ULTRASOUND BREAST LIMITED: CPT

## 2019-02-20 ENCOUNTER — TELEPHONE (OUTPATIENT)
Dept: INTERNAL MEDICINE CLINIC | Age: 59
End: 2019-02-20

## 2019-02-20 RX ORDER — PREDNISONE 5 MG/1
TABLET ORAL
Qty: 21 TAB | Refills: 0 | Status: SHIPPED | OUTPATIENT
Start: 2019-02-20 | End: 2019-04-02

## 2019-02-20 NOTE — TELEPHONE ENCOUNTER
[facility], [place], Emergency Department    [address] [address]    Phone:  [phone]                                       Ghazala Maharaj   MRN: [id number]    Department:  [facility], Emergency Department   Date of Visit:  12/28/2017           After Visit Summary Signature Page     I have received my discharge instructions, and my questions have been answered. I have discussed any challenges I see with this plan with the nurse or doctor.    ..........................................................................................................................................  Patient/Patient Representative Signature      ..........................................................................................................................................  Patient Representative Print Name and Relationship to Patient    ..................................................               ................................................  Date                                            Time    ..........................................................................................................................................  Reviewed by Signature/Title    ...................................................              ..............................................  Date                                                            Time           Seen recently- has the crud- not any better- wants steroid to clear up this mess!    Headache, congestion, ear ache- some fever, sweating, coughing- taking Muccinex, - this was a strange rambling phone call-please advise

## 2019-04-02 ENCOUNTER — OFFICE VISIT (OUTPATIENT)
Dept: INTERNAL MEDICINE CLINIC | Age: 59
End: 2019-04-02

## 2019-04-02 ENCOUNTER — TELEPHONE (OUTPATIENT)
Dept: INTERNAL MEDICINE CLINIC | Age: 59
End: 2019-04-02

## 2019-04-02 ENCOUNTER — HOSPITAL ENCOUNTER (OUTPATIENT)
Dept: GENERAL RADIOLOGY | Age: 59
Discharge: HOME | End: 2019-04-02
Payer: COMMERCIAL

## 2019-04-02 VITALS
OXYGEN SATURATION: 97 % | DIASTOLIC BLOOD PRESSURE: 90 MMHG | RESPIRATION RATE: 14 BRPM | TEMPERATURE: 98.2 F | BODY MASS INDEX: 21.68 KG/M2 | WEIGHT: 127 LBS | HEIGHT: 64 IN | HEART RATE: 79 BPM | SYSTOLIC BLOOD PRESSURE: 166 MMHG

## 2019-04-02 DIAGNOSIS — J18.9 COMMUNITY ACQUIRED PNEUMONIA OF RIGHT LOWER LOBE OF LUNG: Primary | ICD-10-CM

## 2019-04-02 DIAGNOSIS — I10 PRIMARY HYPERTENSION: ICD-10-CM

## 2019-04-02 DIAGNOSIS — R05.9 COUGH: ICD-10-CM

## 2019-04-02 DIAGNOSIS — J18.9 COMMUNITY ACQUIRED PNEUMONIA OF RIGHT LOWER LOBE OF LUNG: ICD-10-CM

## 2019-04-02 PROCEDURE — 71046 X-RAY EXAM CHEST 2 VIEWS: CPT

## 2019-04-02 RX ORDER — HYDROCODONE POLISTIREX AND CHLORPHENIRAMINE POLISTIREX 10; 8 MG/5ML; MG/5ML
5 SUSPENSION, EXTENDED RELEASE ORAL
Qty: 120 ML | Refills: 0 | Status: SHIPPED | OUTPATIENT
Start: 2019-04-02 | End: 2019-04-09

## 2019-04-02 RX ORDER — LEVOFLOXACIN 500 MG/1
500 TABLET, FILM COATED ORAL DAILY
Qty: 7 TAB | Refills: 0 | Status: SHIPPED | OUTPATIENT
Start: 2019-04-02 | End: 2019-04-09

## 2019-04-02 RX ORDER — AMLODIPINE BESYLATE 5 MG/1
5 TABLET ORAL DAILY
Qty: 30 TAB | Refills: 5 | Status: SHIPPED | OUTPATIENT
Start: 2019-04-02 | End: 2019-04-24 | Stop reason: SDUPTHER

## 2019-04-02 NOTE — PROGRESS NOTES
Chief Complaint   Patient presents with    Cough     pt stated she has had a nonproductive cough and sore throat since Jan 24. has tried otc dyquil and nyquil nothing has helped         1. Have you been to the ER, urgent care clinic or hospitalized since your last visit? NO.     2. Have you seen or consulted any other health care providers outside of the [address] [place] [address] since your last visit (Include any pap smears or colon screening)?  NO

## 2019-04-02 NOTE — PROGRESS NOTES
62 y.o. WHITE OR  female who presents for evaluation. At the last visit in late January, she was complaining of URI symptoms so we gave her Z-Dorian. She called back 3 weeks later saying minimal improvement and she requested prednisone taper which was given. He still has not had complete resolution of her symptoms. In fact, several family members were diagnosed with influenza B, she think she had it but she never went for treatment. Currently she is complaining of sore throat, minimal sputum production, low-grade temps, tender lymph nodes. She has been using OTC meds with symptom relief. No wheezing or dyspnea. She has not used her inhaler in a while. Also, we reviewed her blood pressure readings over the last several visits, she appears to meet criteria for primary hypertension.     Past Medical History:   Diagnosis Date    Acne     Dr. Jeni Arce Allergic rhinitis     Dr. Renny Carcamo; has not tried other ssris    Chronic migraine without aura     failed beta blocker, depakote, topamax per pt    Chronic pain     Dr Colbert Dakins    Degeneration of cervical intervertebral disc     Degeneration of lumbar intervertebral disc     Dr Ronald Mccrary 2011 showed degen changes L1-L2 and L3-L4, mild to mod bilat foraminal narrowing    Fibromyalgia syndrome     Ganglion cyst     GERD (gastroesophageal reflux disease)     Hyperlipidemia     calculated 10 year risk score 4.6% (9/15); 4.9% (9/16); 7.1% (1/19)    Hypovitaminosis D     Primary hypertension 4/2/2019    PUD (peptic ulcer disease) 2011    Dr. Sukhjinder Valenzuela, duodenal w gi bleed    Spinal stenosis in cervical region     Tension headache, chronic     uses fioricet    Tobacco dependence syndrome     declined meds; able to quit on her own but not ready due to stressors     Past Surgical History:   Procedure Laterality Date    HX COLONOSCOPY      Dr Sukhjinder Valenzuela 3/28/11 neg    HX ORTHOPAEDIC      7 hand surgeries, Dr. Stevan Pedraza [address] [facility]    right foot surgery, Dr. Beryle Czar History     Socioeconomic History    Marital status:      Spouse name: Not on file    Number of children: 1    Years of education: Not on file    Highest education level: Not on file   Occupational History    Occupation: owns [employer]   Social Needs    Financial resource strain: Not on file    Food insecurity:     Worry: Not on file     Inability: Not on file    Transportation needs:     Medical: Not on file     Non-medical: Not on file   Tobacco Use    Smoking status: Current Every Day Smoker     Packs/day: 1.00     Years: 37.00     Pack years: 37.00    Smokeless tobacco: Never Used   Substance and Sexual Activity    Alcohol use: No    Drug use: No    Sexual activity: Not on file   Lifestyle    Physical activity:     Days per week: Not on file     Minutes per session: Not on file    Stress: Not on file   Relationships    Social connections:     Talks on phone: Not on file     Gets together: Not on file     Attends Yazidi service: Not on file     Active member of club or organization: Not on file     Attends meetings of clubs or organizations: Not on file     Relationship status: Not on file    Intimate partner violence:     Fear of current or ex partner: Not on file     Emotionally abused: Not on file     Physically abused: Not on file     Forced sexual activity: Not on file   Other Topics Concern    Not on file   Social History Narrative    Not on file     Current Outpatient Medications   Medication Sig    amLODIPine (NORVASC) 5 mg tablet Take 1 Tab by mouth daily.  levoFLOXacin (LEVAQUIN) 500 mg tablet Take 1 Tab by mouth daily for 7 days.  chlorpheniramine-HYDROcodone (TUSSIONEX) 10-8 mg/5 mL suspension Take 5 mL by mouth every twelve (12) hours as needed for Cough for up to 7 days. Max Daily Amount: 10 mL.  ergocalciferol (ERGOCALCIFEROL) 50,000 unit capsule Take 1 Cap by mouth every thirty (30) days.  SUMAtriptan (IMITREX) 100 mg tablet Take 1 Tab by mouth once as needed for Migraine for up to 1 dose. Indications: Migraine    pantoprazole (PROTONIX) 40 mg tablet TAKE 1 TABLET BY MOUTH ONCE DAILY    cyclobenzaprine (FLEXERIL) 10 mg tablet TAKE 1 TABLET BY MOUTH THREE TIMES DAILY AS NEEDED FOR MUSCLE SPASM(S)--- 90 day supply  Indications: MUSCLE SPASM    venlafaxine-SR (EFFEXOR-XR) 75 mg capsule TAKE 3 CAPSULES BY MOUTH ONCE DAILY  90 days    naloxone 4 mg/actuation spry 4 mg by Nasal route as needed for up to 2 doses. Indications: OPIOID TOXICITY    loratadine (CLARITIN) 10 mg tablet Take 10 mg by mouth.  albuterol (PROVENTIL, VENTOLIN) 90 mcg/Actuation inhaler Take 2 Puffs by inhalation every six (6) hours as needed.  PSEUDOEPHEDRINE HCL (SUDAFED PO) Take  by mouth. No current facility-administered medications for this visit. Allergies   Allergen Reactions    Aspirin Other (comments)    Nsaids (Non-Steroidal Anti-Inflammatory Drug) Other (comments)     Pt has had bleeding ulcers     REVIEW OF SYSTEMS:   Ophtho - no vision change or eye pain  Oral - no mouth pain, tongue or tooth problems  Ears - no hearing loss, ear pain, fullness, no swallowing problems  Cardiac - no CP, PND, orthopnea, edema, palpitations or syncope  GI - no heartburn, nausea, vomiting, change in bowel habits, bleeding, hemorrhoids  Urinary - no dysuria, hematuria, flank pain, urgency, frequency  Genitals - no genital lesions, discharge, masses, ulceration, warts    Visit Vitals  /90   Pulse 79   Temp 98.2 °F (36.8 °C) (Oral)   Resp 14   Ht 5' 4\" (1.626 m)   Wt 127 lb (57.6 kg)   SpO2 97%   BMI 21.80 kg/m²   Tympanic membranes normal, sinuses nontender with boggy mucosa noted, oropharynx with some mucosal ulcerations. Shotty tender adenopathy noted bilaterally. Lungs with good breath sounds, no obvious wheezes, crackles heard at the right base. Heart showed regular rate rhythm.   Abdomen soft and nontender    Assessment and plan:  1. Rule out pneumonia. Chest x-ray to be done, empiric Levaquin is given. Tussionex for cough. 2.  Probable hypertension. Start amlodipine after discussing possible side effects. Above conditions discussed at length and patient vocalized understanding.   All questions answered to patient satisfaction

## 2019-04-02 NOTE — TELEPHONE ENCOUNTER
Rosalba Kruse, calling from [facility] says pt got rx today for levofloxacin and is already on effexor. There may be an interaction. Wants to be sure this is ok with KRYSTIN.

## 2019-04-07 ENCOUNTER — TELEPHONE (OUTPATIENT)
Dept: INTERNAL MEDICINE CLINIC | Age: 59
End: 2019-04-07

## 2020-03-23 DIAGNOSIS — I10 PRIMARY HYPERTENSION: ICD-10-CM

## 2020-03-23 RX ORDER — AMLODIPINE BESYLATE 5 MG/1
TABLET ORAL
Qty: 90 TAB | Refills: 3 | Status: ON HOLD | OUTPATIENT
Start: 2020-03-23 | End: 2021-03-05

## 2020-05-04 ENCOUNTER — VIRTUAL VISIT (OUTPATIENT)
Dept: INTERNAL MEDICINE CLINIC | Age: 60
End: 2020-05-04

## 2020-05-04 DIAGNOSIS — J01.10 ACUTE NON-RECURRENT FRONTAL SINUSITIS: ICD-10-CM

## 2020-05-04 DIAGNOSIS — H66.90 ACUTE OTITIS MEDIA, UNSPECIFIED OTITIS MEDIA TYPE: Primary | ICD-10-CM

## 2020-05-04 RX ORDER — AMOXICILLIN AND CLAVULANATE POTASSIUM 875; 125 MG/1; MG/1
1 TABLET, FILM COATED ORAL 2 TIMES DAILY
Qty: 20 TAB | Refills: 0 | Status: SHIPPED | OUTPATIENT
Start: 2020-05-04 | End: 2020-05-14

## 2020-05-04 NOTE — PROGRESS NOTES
Equilla Cooks is a 61 y.o. female who was seen by synchronous (real-time) audio-video technology on 5/4/2020. Consent: Equilla Cooks, who was seen by synchronous (real-time) audio-video technology, and/or her healthcare decision maker, is aware that this patient-initiated, Telehealth encounter on 5/4/2020 is a billable service, with coverage as determined by her insurance carrier. She is aware that she may receive a bill and has provided verbal consent to proceed: Yes. Assessment & Plan:   Diagnoses and all orders for this visit:    1. Acute otitis media, unspecified otitis media type  -     amoxicillin-clavulanate (AUGMENTIN) 875-125 mg per tablet; Take 1 Tab by mouth two (2) times a day for 10 days. 2. Acute non-recurrent frontal sinusitis  -     amoxicillin-clavulanate (AUGMENTIN) 875-125 mg per tablet; Take 1 Tab by mouth two (2) times a day for 10 days. I spent at least 23 minutes on this visit with this established patient. [phone]  Subjective:   Equilla Cooks is a 61 y.o. female who was seen for No chief complaint on file. She is in the process of moving back from [place] to [place] over the last several weeks. For several weeks now, her allergies have been flaring and she thinks she has developed a sinus infection. Been using OTC meds with good relief. However, she has been having right ear pain for 2 weeks now and is starting to have sx on the left side. She describes a lot of facial pressure, discolored material, no measured temps although she has had some subjective fever. No hearing loss, tinnitus, dizziness. Minimal sore throat, she has minimally productive cough, no wheezing or dyspnea. She's been using Claritin and Mucinex. Objective: There were no vitals taken for this visit.    General: alert, cooperative, no distress   Mental  status: normal mood, behavior, speech, dress, motor activity, and thought processes, able to follow commands   HENT: NCAT   Neck: no visualized mass   Resp: no respiratory distress   Neuro: no gross deficits   Skin: no discoloration or lesions of concern on visible areas   Psychiatric: normal affect, consistent with stated mood, no evidence of hallucinations     Additional exam findings:   I asked patient to do a self-exam.  There was maxillary/frontal sinus tenderness on percussion. Unable to see the throat due to poor video quality. No pain on manipulation of the external ears bilaterally, no mastoid tenderness. She could not palpate any inflamed lymph nodes    Assessment and plan:  1. Presumed sinusitis and possible early otitis media. I gave her Augmentin, continue OTC meds for symptom relief, call with an update or if no better    Above conditions discussed at length and patient vocalized understanding. All questions answered to patient satisfaction      We discussed the expected course, resolution and complications of the diagnosis(es) in detail. Medication risks, benefits, costs, interactions, and alternatives were discussed as indicated. I advised her to contact the office if her condition worsens, changes or fails to improve as anticipated. She expressed understanding with the diagnosis(es) and plan. Christopher Alcantar is a 61 y.o. female who was evaluated by a video visit encounter for concerns as above. Patient identification was verified prior to start of the visit. A caregiver was present when appropriate. Due to this being a TeleHealth encounter (During [facility]- public health emergency), evaluation of the following organ systems was limited: Vitals/Constitutional/EENT/Resp/CV/GI//MS/Neuro/Skin/Heme-Lymph-Imm.   Pursuant to the emergency declaration under the [facility], 1135 waiver authority and the [employer] and [employer] Act, this Virtual  Visit was conducted, with patient's (and/or legal guardian's) consent, to reduce the patient's risk of exposure to COVID-19 and provide necessary medical care. Services were provided through a video synchronous discussion virtually to substitute for in-person clinic visit. Patient and provider were located at their individual homes.       Sydnee Dodson MD

## 2020-06-03 ENCOUNTER — HOSPITAL ENCOUNTER (OUTPATIENT)
Dept: LAB | Age: 60
Discharge: HOME | End: 2020-06-03
Payer: COMMERCIAL

## 2020-06-03 ENCOUNTER — APPOINTMENT (OUTPATIENT)
Dept: INTERNAL MEDICINE CLINIC | Age: 60
End: 2020-06-03

## 2020-06-03 LAB
ALBUMIN SERPL-MCNC: 4 G/DL (ref 3.4–5)
ALBUMIN/GLOB SERPL: 1.2 {RATIO} (ref 0.8–1.7)
ALP SERPL-CCNC: 93 U/L (ref 45–117)
ALT SERPL-CCNC: 17 U/L (ref 13–56)
ANION GAP SERPL CALC-SCNC: 7 MMOL/L (ref 3–18)
AST SERPL-CCNC: 17 U/L (ref 10–38)
BILIRUB SERPL-MCNC: 0.3 MG/DL (ref 0.2–1)
BUN SERPL-MCNC: 12 MG/DL (ref 7–18)
BUN/CREAT SERPL: 12 (ref 12–20)
CALCIUM SERPL-MCNC: 8.8 MG/DL (ref 8.5–10.1)
CHLORIDE SERPL-SCNC: 105 MMOL/L (ref 100–111)
CHOLEST SERPL-MCNC: 248 MG/DL
CO2 SERPL-SCNC: 26 MMOL/L (ref 21–32)
CREAT SERPL-MCNC: 0.98 MG/DL (ref 0.6–1.3)
ERYTHROCYTE [DISTWIDTH] IN BLOOD BY AUTOMATED COUNT: 17.5 % (ref 11.6–14.5)
GLOBULIN SER CALC-MCNC: 3.4 G/DL (ref 2–4)
GLUCOSE SERPL-MCNC: 92 MG/DL (ref 74–99)
HCT VFR BLD AUTO: 24.6 % (ref 35–45)
HDLC SERPL-MCNC: 79 MG/DL (ref 40–60)
HDLC SERPL: 3.1 {RATIO} (ref 0–5)
HGB BLD-MCNC: 6.5 G/DL (ref 12–16)
LDLC SERPL CALC-MCNC: 142 MG/DL (ref 0–100)
LIPID PROFILE,FLP: ABNORMAL
MCH RBC QN AUTO: 18.2 PG (ref 24–34)
MCHC RBC AUTO-ENTMCNC: 26.4 G/DL (ref 31–37)
MCV RBC AUTO: 68.7 FL (ref 74–97)
PLATELET # BLD AUTO: 338 K/UL (ref 135–420)
PMV BLD AUTO: 9.2 FL (ref 9.2–11.8)
POTASSIUM SERPL-SCNC: 4.8 MMOL/L (ref 3.5–5.5)
PROT SERPL-MCNC: 7.4 G/DL (ref 6.4–8.2)
RBC # BLD AUTO: 3.58 M/UL (ref 4.2–5.3)
SODIUM SERPL-SCNC: 138 MMOL/L (ref 136–145)
TRIGL SERPL-MCNC: 135 MG/DL (ref ?–150)
TSH SERPL DL<=0.05 MIU/L-ACNC: 1.72 UIU/ML (ref 0.36–3.74)
VLDLC SERPL CALC-MCNC: 27 MG/DL
WBC # BLD AUTO: 4.7 K/UL (ref 4.6–13.2)

## 2020-06-03 PROCEDURE — 80053 COMPREHEN METABOLIC PANEL: CPT

## 2020-06-03 PROCEDURE — 84443 ASSAY THYROID STIM HORMONE: CPT

## 2020-06-03 PROCEDURE — 80061 LIPID PANEL: CPT

## 2020-06-03 PROCEDURE — 36415 COLL VENOUS BLD VENIPUNCTURE: CPT

## 2020-06-03 PROCEDURE — 85027 COMPLETE CBC AUTOMATED: CPT

## 2020-06-08 ENCOUNTER — TELEPHONE (OUTPATIENT)
Dept: INTERNAL MEDICINE CLINIC | Age: 60
End: 2020-06-08

## 2020-06-08 NOTE — TELEPHONE ENCOUNTER
Pt calling, says she got her lab results on my chart on Friday and they are awful. She had to push pe into July due to covid. Wants to know if RD has reviewed them and if she needs to be seen sooner. Says 7 years ago when labs looked like this she got really sick.     Wants to talk to RD or a nurse today to advise and go over her labs

## 2020-06-09 ENCOUNTER — HOSPITAL ENCOUNTER (OUTPATIENT)
Dept: LAB | Age: 60
Discharge: HOME | End: 2020-06-09
Payer: COMMERCIAL

## 2020-06-09 ENCOUNTER — TELEPHONE (OUTPATIENT)
Dept: INTERNAL MEDICINE CLINIC | Age: 60
End: 2020-06-09

## 2020-06-09 ENCOUNTER — OFFICE VISIT (OUTPATIENT)
Dept: INTERNAL MEDICINE CLINIC | Age: 60
End: 2020-06-09

## 2020-06-09 VITALS
RESPIRATION RATE: 14 BRPM | SYSTOLIC BLOOD PRESSURE: 145 MMHG | OXYGEN SATURATION: 95 % | WEIGHT: 131 LBS | HEART RATE: 93 BPM | DIASTOLIC BLOOD PRESSURE: 65 MMHG | TEMPERATURE: 96.8 F | BODY MASS INDEX: 22.36 KG/M2 | HEIGHT: 64 IN

## 2020-06-09 DIAGNOSIS — G89.4 CHRONIC PAIN SYNDROME: ICD-10-CM

## 2020-06-09 DIAGNOSIS — K12.1 MOUTH ULCER: ICD-10-CM

## 2020-06-09 DIAGNOSIS — E53.8 B12 DEFICIENCY: ICD-10-CM

## 2020-06-09 DIAGNOSIS — K27.9 PEPTIC ULCER DISEASE: ICD-10-CM

## 2020-06-09 DIAGNOSIS — D50.9 MICROCYTIC ANEMIA: ICD-10-CM

## 2020-06-09 DIAGNOSIS — I10 PRIMARY HYPERTENSION: ICD-10-CM

## 2020-06-09 DIAGNOSIS — E61.1 IRON DEFICIENCY: ICD-10-CM

## 2020-06-09 DIAGNOSIS — K92.2 GASTROINTESTINAL HEMORRHAGE, UNSPECIFIED GASTROINTESTINAL HEMORRHAGE TYPE: Primary | ICD-10-CM

## 2020-06-09 LAB
ERYTHROCYTE [SEDIMENTATION RATE] IN BLOOD: 16 MM/HR (ref 0–30)
FERRITIN SERPL-MCNC: 3 NG/ML (ref 8–388)
FOLATE SERPL-MCNC: 13.9 NG/ML (ref 3.1–17.5)
IRON SATN MFR SERPL: 2 % (ref 20–50)
IRON SERPL-MCNC: 8 UG/DL (ref 50–175)
RETICS/RBC NFR AUTO: 1.5 % (ref 0.5–2.3)
TIBC SERPL-MCNC: 514 UG/DL (ref 250–450)
VIT B12 SERPL-MCNC: 263 PG/ML (ref 211–911)

## 2020-06-09 PROCEDURE — 85652 RBC SED RATE AUTOMATED: CPT

## 2020-06-09 PROCEDURE — 82607 VITAMIN B-12: CPT

## 2020-06-09 PROCEDURE — 82728 ASSAY OF FERRITIN: CPT

## 2020-06-09 PROCEDURE — 84155 ASSAY OF PROTEIN SERUM: CPT

## 2020-06-09 PROCEDURE — 83540 ASSAY OF IRON: CPT

## 2020-06-09 PROCEDURE — 86038 ANTINUCLEAR ANTIBODIES: CPT

## 2020-06-09 PROCEDURE — 36415 COLL VENOUS BLD VENIPUNCTURE: CPT

## 2020-06-09 PROCEDURE — 85045 AUTOMATED RETICULOCYTE COUNT: CPT

## 2020-06-09 RX ORDER — BUPROPION HYDROCHLORIDE 300 MG/1
300 TABLET ORAL
COMMUNITY

## 2020-06-09 NOTE — TELEPHONE ENCOUNTER
Per verbal order from  to contact infusion center to get patient scheduled for 2 units of blood. Patient scheduled Leda 10 at 9:30am for type/ cross and June 11 at 8am for the Transfusion.  Patient is aware

## 2020-06-10 ENCOUNTER — HOSPITAL ENCOUNTER (OUTPATIENT)
Dept: INFUSION THERAPY | Age: 60
Discharge: HOME | End: 2020-06-10
Payer: COMMERCIAL

## 2020-06-10 ENCOUNTER — TELEPHONE (OUTPATIENT)
Dept: INTERNAL MEDICINE CLINIC | Age: 60
End: 2020-06-10

## 2020-06-10 VITALS
TEMPERATURE: 98.9 F | HEART RATE: 90 BPM | SYSTOLIC BLOOD PRESSURE: 137 MMHG | OXYGEN SATURATION: 99 % | RESPIRATION RATE: 18 BRPM | DIASTOLIC BLOOD PRESSURE: 64 MMHG

## 2020-06-10 PROCEDURE — 36415 COLL VENOUS BLD VENIPUNCTURE: CPT

## 2020-06-10 PROCEDURE — 86923 COMPATIBILITY TEST ELECTRIC: CPT

## 2020-06-10 PROCEDURE — 86900 BLOOD TYPING SEROLOGIC ABO: CPT

## 2020-06-10 RX ORDER — ACETAMINOPHEN 325 MG/1
650 TABLET ORAL ONCE
Status: CANCELLED | OUTPATIENT
Start: 2020-06-11 | End: 2020-06-11

## 2020-06-10 RX ORDER — SODIUM CHLORIDE 9 MG/ML
250 INJECTION, SOLUTION INTRAVENOUS AS NEEDED
Status: DISCONTINUED | OUTPATIENT
Start: 2020-06-10 | End: 2020-06-14 | Stop reason: HOSPADM

## 2020-06-10 RX ORDER — DIPHENHYDRAMINE HCL 25 MG
25 CAPSULE ORAL ONCE
Status: CANCELLED | OUTPATIENT
Start: 2020-06-11 | End: 2020-06-11

## 2020-06-10 RX ORDER — PANTOPRAZOLE SODIUM 40 MG/1
40 TABLET, DELAYED RELEASE ORAL DAILY
Qty: 180 TAB | Refills: 3 | Status: SHIPPED | OUTPATIENT
Start: 2020-06-10

## 2020-06-10 NOTE — PROGRESS NOTES
[facility] Lab Visit:    Farhan Toney  1960  [id number]    1000:  Pt arrived ambulatory. Labs drawn peripherally and sent for processing. Pt is scheduled to retrun 6/11/20 for 2 units of RBC's. Departed [facility] ambulatory and in no distress.     Visit Vitals  /64 (BP 1 Location: Right arm, BP Patient Position: Sitting)   Pulse 90   Temp 98.9 °F (37.2 °C)   Resp 18   SpO2 99%

## 2020-06-10 NOTE — PROGRESS NOTES
61 y.o. WHITE OR  female who presents for evaluation    We were supposed to see her for RPE later in July but she's here with acute problems    She continues to go to Dr Piero Munguia for pain mgmt. She was changed over from effexor to wellbutrin and feels a lot better apparently    For the last '6-9 months', she's just felt unwell. 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  She just has not had a chance to f/u with us any earlier. Describes feeling tired a lot with easy fatigability, no cp, worsening sob, pnd, edema, palpitations. She's been having intermittent abd pain akin to 'when she had an ulcer years ago'. Admits to having occasional dark even tarry stool weeks apart usually, no clots or brbpr, hemorrhoids, epistaxis. No n/v, fevers, wt loss or other constitutional complains. She remains on ppi daily. She's been 'eating ice' for about 6 mos. Admits that she 'occasionally' (maybe once a week?) uses motrin 800 on top of the meds given by pain mgmt    No  or gyn complaints. Specifically no bleeding from anywhere    Her bp has been controlled and no cardiovascular complaints except getting winded with exertion. Not able to stop smoking, declined meds previously.   No wheezing although she has intermittent coughing    She's been seeing her dentist due to tooth and gum problems, also reports recurring mouth ulcers    Past Medical History:   Diagnosis Date    Acne     Dr. Alfonzo Erwin Allergic rhinitis     Dr. Stephanie Espino; has not tried other ssris    Chronic migraine without aura     failed beta blocker, depakote, topamax per pt    Chronic pain     Dr Piero Munguia    Degeneration of cervical intervertebral disc     Degeneration of lumbar intervertebral disc Dr Geno Gallego mri 2011 showed degen changes L1-L2 and L3-L4, mild to mod bilat foraminal narrowing    Fibromyalgia syndrome     Ganglion cyst     GERD (gastroesophageal reflux disease)     Hyperlipidemia     calculated 10 year risk score 4.6% (9/15); 4.9% (9/16); 7.1% (1/19); 9% (6/20)    Hypovitaminosis D     Primary hypertension 4/2/2019    PUD (peptic ulcer disease) 2011    Dr. Maikel Ruiz, duodenal w gi bleed    Spinal stenosis in cervical region     Tension headache, chronic     uses fioricet    Tobacco dependence syndrome     declined meds; able to quit on her own but not ready due to stressors     Past Surgical History:   Procedure Laterality Date    HX COLONOSCOPY      Dr Maikel Ruiz 3/28/11 neg   Dianna Moss; 7 hand surgeries   [address]    Dr. Rivera Reyes foot surgery    HX ORTHOPAEDIC  2000s    h/o pelvis fracture     Social History     Socioeconomic History    Marital status:      Spouse name: Not on file    Number of children: 1    Years of education: Not on file    Highest education level: Not on file   Occupational History    Occupation: owns [employer]   Social Needs    Financial resource strain: Not on file    Food insecurity     Worry: Not on file     Inability: Not on file   [employer] needs     Medical: Not on file     Non-medical: Not on file   Tobacco Use    Smoking status: Current Every Day Smoker     Packs/day: 1.00     Years: 37.00     Pack years: 37.00    Smokeless tobacco: Never Used   Substance and Sexual Activity    Alcohol use: No    Drug use: No    Sexual activity: Not on file   Lifestyle    Physical activity     Days per week: Not on file     Minutes per session: Not on file    Stress: Not on file   Relationships    Social connections     Talks on phone: Not on file     Gets together: Not on file     Attends Restorationism service: Not on file     Active member of club or organization: Not on file     Attends meetings of clubs or organizations: Not on file     Relationship status: Not on file    Intimate partner violence     Fear of current or ex partner: Not on file     Emotionally abused: Not on file     Physically abused: Not on file     Forced sexual activity: Not on file   Other Topics Concern    Not on file   Social History Narrative    Not on file     Current Outpatient Medications   Medication Sig    pantoprazole (PROTONIX) 40 mg tablet Take 1 Tab by mouth daily.  buPROPion XL (Wellbutrin XL) 300 mg XL tablet Take 300 mg by mouth every morning.  amLODIPine (NORVASC) 5 mg tablet TAKE 1 TABLET BY MOUTH EVERY DAY    SUMAtriptan (IMITREX) 100 mg tablet Take 1 Tab by mouth once as needed for Migraine for up to 1 dose. Indications: Migraine    cyclobenzaprine (FLEXERIL) 10 mg tablet TAKE 1 TABLET BY MOUTH THREE TIMES DAILY AS NEEDED FOR MUSCLE SPASM(S)--- 90 day supply  Indications: MUSCLE SPASM    naloxone 4 mg/actuation spry 4 mg by Nasal route as needed for up to 2 doses. Indications: OPIOID TOXICITY    loratadine (CLARITIN) 10 mg tablet Take 10 mg by mouth.  albuterol (PROVENTIL, VENTOLIN) 90 mcg/Actuation inhaler Take 2 Puffs by inhalation every six (6) hours as needed.  ergocalciferol (ERGOCALCIFEROL) 50,000 unit capsule Take 1 Cap by mouth every thirty (30) days. No current facility-administered medications for this visit.       Facility-Administered Medications Ordered in Other Visits   Medication Dose Route Frequency    0.9% sodium chloride infusion 250 mL  250 mL IntraVENous PRN     Allergies   Allergen Reactions    Aspirin Other (comments)    Nsaids (Non-Steroidal Anti-Inflammatory Drug) Other (comments)     Pt has had bleeding ulcers     REVIEW OF SYSTEMS: gyn Dr Juarez Person 2016?, mammo 2/19, colo 2012 Dr Frances Stewart  Ophtho - no vision change or eye pain  Oral - no mouth pain, tongue or tooth problems  Ears - no hearing loss, ear pain, fullness, no swallowing problems  Cardiac - no CP, PND, orthopnea, edema, palpitations or syncope  Chest - no breast masses  Resp - no wheezing, chronic coughing, dyspnea  Urinary - no dysuria, hematuria, flank pain, urgency, frequency      Visit Vitals  /65   Pulse 93   Temp 96.8 °F (36 °C) (Temporal)   Resp 14   Ht 5' 4\" (1.626 m)   Wt 131 lb (59.4 kg)   SpO2 95%   BMI 22.49 kg/m²     A&O x3  Affect is appropriate. Mood stable  No apparent distress  Anicteric, no JVD, adenopathy or thyromegaly. Mouth ulcers evident bilaterally  No carotid bruits or radiated murmur  Lungs clear to auscultation, no wheezes or rales  Heart showed regular rate and rhythm. No murmur, rubs, gallops  Abdomen soft nontender, no hepatosplenomegaly or masses. Extremities without edema.   Pulses 1-2+ symmetrically  Rectal showed guaiac NEG brown stool normal tone    LABS   From 2/10 showed gluc 88, cr 0.80,             alt 26, chol 255, tg 98,    hdl 68, ldl-c 168, wbc 4.5, hb 13.4, plt 233, tsh 0.92, ua neg  From 1/11 showed gluc 94, cr 0.60, gfr>60,            chol 137, tg 123, hdl 29, ldl-c 83,              ck/trop neg  From 6/13 showed gluc 95, cr 0.70, gfr>60, alt 17, chol 232, tg 170, hdl 62, ldl-c 136,        ua neg, vit d 23.4  From 9/15 showed gluc 91, cr 0.96, gfr 60,  alt<5,  chol 255, tg 103, hdl 76, ldl-c 158, wbc 6.8, hb 13.2, plt 271,     ua neg, vit d 23.0  From 9/16 showed gluc 87, cr 0.73, gfr 92,  alt 13, chol 254, tg 122, hdl 84, ldl-c 146, wbc 5.6, hb 13.0, plt 270, tsh 1.76, ua neg, hep c neg  From 9/17 showed gluc 86, cr 0.76, gfr>60, alt 15, chol 263, tg 145, hdl 92, ldl-c 142, wbc 5.2, hb 12.2, plt 277,     ua neg, vit d 99.4  From 1/19 showed gluc 101, cr 0.88, gfr>60, alt 12, chol 283, tg 183, hdl 76, ldl-c 162, vit d 25.7    Results for orders placed or performed during the hospital encounter of 06/03/20   CBC W/O DIFF   Result Value Ref Range    WBC 4.7 4.6 - 13.2 K/uL    RBC 3.58 (L) 4.20 - 5.30 M/uL    HGB 6.5 (L) 12.0 - 16.0 g/dL    HCT 24.6 (L) 35.0 - 45.0 %    MCV 68.7 (L) 74.0 - 97.0 FL    MCH 18.2 (L) 24.0 - 34.0 PG    MCHC 26.4 (L) 31.0 - 37.0 g/dL    RDW 17.5 (H) 11.6 - 14.5 %    PLATELET 119 301 - 845 K/uL    MPV 9.2 9.2 - 11.8 FL   LIPID PANEL   Result Value Ref Range    LIPID PROFILE          Cholesterol, total 248 (H) <200 MG/DL    Triglyceride 135 <150 MG/DL    HDL Cholesterol 79 (H) 40 - 60 MG/DL    LDL, calculated 142 (H) 0 - 100 MG/DL    VLDL, calculated 27 MG/DL    CHOL/HDL Ratio 3.1 0 - 5.0     METABOLIC PANEL, COMPREHENSIVE   Result Value Ref Range    Sodium 138 136 - 145 mmol/L    Potassium 4.8 3.5 - 5.5 mmol/L    Chloride 105 100 - 111 mmol/L    CO2 26 21 - 32 mmol/L    Anion gap 7 3.0 - 18 mmol/L    Glucose 92 74 - 99 mg/dL    BUN 12 7.0 - 18 MG/DL    Creatinine 0.98 0.6 - 1.3 MG/DL    BUN/Creatinine ratio 12 12 - 20      GFR est AA >60 >60 ml/min/1.73m2    GFR est non-AA 58 (L) >60 ml/min/1.73m2    Calcium 8.8 8.5 - 10.1 MG/DL    Bilirubin, total 0.3 0.2 - 1.0 MG/DL    ALT (SGPT) 17 13 - 56 U/L    AST (SGOT) 17 10 - 38 U/L    Alk. phosphatase 93 45 - 117 U/L    Protein, total 7.4 6.4 - 8.2 g/dL    Albumin 4.0 3.4 - 5.0 g/dL    Globulin 3.4 2.0 - 4.0 g/dL    A-G Ratio 1.2 0.8 - 1.7     TSH 3RD GENERATION   Result Value Ref Range    TSH 1.72 0.36 - 3.74 uIU/mL     Calculated 10 year risk score was 9.0%    Patient Active Problem List   Diagnosis Code    Hyperlipidemia E78.5    Hypovitaminosis D E55.9    Chronic pain syndrome G89.4    Degeneration of lumbar or lumbosacral intervertebral disc M51.37    Polyarthralgia M25.50    Generalized OA M15.9    Tobacco dependence syndrome F17.200    Peptic ulcer disease K27.9    Anxiety F41.9    Primary hypertension I10     Assessment and plan:  1. Allergic rhinitis. Prn meds  2. Chronic pain syndrome. F/U Dr Rhona Rosenberg   3. Anxiety. Continue current  4. Hyperlipidemia. On dietary and lifestyle measures and doing better; defer statin discussion to next visit with acute issues below  5.  H/O PUD and GERD.  PPI and avoidance measures indefinitely  6. General.  F/U gyn, mammo   7. Smoking cessation meds declined  8. Hypovit d.  supplementation  9. Profound anemia. Her mcv has dropped from the 90s to 60s; she has abd pain, pica, passed dark stools in the past although guaiac neg on exam today. Suspect ugi bleeding; will double protonix, give 2U prbc, and refer asap for GI consult. No NSAID ever. 10.  Recurring mouth ulcer. Dermatology consult        RTC 2 months    Above conditions discussed at length and patient vocalized understanding.   All questions answered to patient satisfaction

## 2020-06-11 ENCOUNTER — HOSPITAL ENCOUNTER (OUTPATIENT)
Dept: INFUSION THERAPY | Age: 60
Discharge: HOME | End: 2020-06-11
Payer: COMMERCIAL

## 2020-06-11 VITALS
TEMPERATURE: 98.4 F | OXYGEN SATURATION: 100 % | HEART RATE: 85 BPM | RESPIRATION RATE: 18 BRPM | SYSTOLIC BLOOD PRESSURE: 162 MMHG | DIASTOLIC BLOOD PRESSURE: 80 MMHG

## 2020-06-11 LAB — ANA TITR SER IF: NEGATIVE {TITER}

## 2020-06-11 PROCEDURE — 74011250637 HC RX REV CODE- 250/637: Performed by: INTERNAL MEDICINE

## 2020-06-11 PROCEDURE — P9016 RBC LEUKOCYTES REDUCED: HCPCS

## 2020-06-11 PROCEDURE — 74011250636 HC RX REV CODE- 250/636: Performed by: INTERNAL MEDICINE

## 2020-06-11 PROCEDURE — 36430 TRANSFUSION BLD/BLD COMPNT: CPT

## 2020-06-11 PROCEDURE — 77030013169 SET IV BLD ICUM -A

## 2020-06-11 RX ORDER — SODIUM CHLORIDE 9 MG/ML
250 INJECTION, SOLUTION INTRAVENOUS AS NEEDED
Status: DISCONTINUED | OUTPATIENT
Start: 2020-06-11 | End: 2020-06-15 | Stop reason: HOSPADM

## 2020-06-11 RX ORDER — ACETAMINOPHEN 325 MG/1
650 TABLET ORAL ONCE
Status: COMPLETED | OUTPATIENT
Start: 2020-06-11 | End: 2020-06-11

## 2020-06-11 RX ORDER — DIPHENHYDRAMINE HCL 25 MG
25 CAPSULE ORAL
Status: DISCONTINUED | OUTPATIENT
Start: 2020-06-11 | End: 2020-06-15 | Stop reason: HOSPADM

## 2020-06-11 RX ADMIN — ACETAMINOPHEN 650 MG: 325 TABLET ORAL at 08:39

## 2020-06-11 RX ADMIN — SODIUM CHLORIDE 250 ML: 9 INJECTION, SOLUTION INTRAVENOUS at 08:40

## 2020-06-11 RX ADMIN — DIPHENHYDRAMINE HYDROCHLORIDE 25 MG: 25 CAPSULE ORAL at 08:39

## 2020-06-11 NOTE — PROGRESS NOTES
[facility] Progress Note    Date: 2020    Name: Jenni Bradshaw    MRN: [id number]         : 1960      Ms. Addis Perez arrived to [facility] at 7987. Ms. Addis Perez was assessed and education was provided. Ms. Singh's vitals were reviewed. Visit Vitals  /80 (BP 1 Location: Right arm, BP Patient Position: Post activity)   Pulse 85   Temp 98.4 °F (36.9 °C)   Resp 18   SpO2 100%   Breastfeeding No       22g IV inserted in patient's left AC x2 attempts. Brisk blood return and flushes without difficulty. Normal saline initiated at Fiorella Pall per order. Pre-medications Tylenol 650 mg and Benadryl 25 mg po were administered as ordered. Two units of PRBCs administered as ordered followed by normal saline flush. Ms. Addis Perez tolerated transfusion without complaints. IV removed/ intact. Gauze/ coban applied to site. Instructed pt to report SOB, CP, elevated temp, back pain, or other symptoms of transfusion reaction to MD or ED. Pt verbalized understanding. Pt armbands removed/ shredded. Ms. Addis Perez was discharged from [place] [address] in stable condition at 1350. She is to follow with Dr. Viky Walsh.       Jordyn Guevara  2020

## 2020-06-12 LAB
ABO + RH BLD: NORMAL
ALBUMIN SERPL ELPH-MCNC: 3.7 G/DL (ref 2.9–4.4)
ALBUMIN/GLOB SERPL: 1.2 {RATIO} (ref 0.7–1.7)
ALPHA1 GLOB SERPL ELPH-MCNC: 0.3 G/DL (ref 0–0.4)
ALPHA2 GLOB SERPL ELPH-MCNC: 0.8 G/DL (ref 0.4–1)
B-GLOBULIN SERPL ELPH-MCNC: 1.1 G/DL (ref 0.7–1.3)
BLD PROD TYP BPU: NORMAL
BLD PROD TYP BPU: NORMAL
BLOOD GROUP ANTIBODIES SERPL: NORMAL
BPU ID: NORMAL
BPU ID: NORMAL
CROSSMATCH RESULT,%XM: NORMAL
CROSSMATCH RESULT,%XM: NORMAL
GAMMA GLOB SERPL ELPH-MCNC: 0.9 G/DL (ref 0.4–1.8)
GLOBULIN SER CALC-MCNC: 3.2 G/DL (ref 2.2–3.9)
M PROTEIN SERPL ELPH-MCNC: NORMAL G/DL
PROT SERPL-MCNC: 6.9 G/DL (ref 6–8.5)
SPECIMEN EXP DATE BLD: NORMAL
STATUS OF UNIT,%ST: NORMAL
STATUS OF UNIT,%ST: NORMAL
UNIT DIVISION, %UDIV: 0
UNIT DIVISION, %UDIV: 0

## 2020-07-01 ENCOUNTER — ANESTHESIA EVENT (OUTPATIENT)
Dept: ENDOSCOPY | Age: 60
End: 2020-07-01
Payer: COMMERCIAL

## 2020-07-02 ENCOUNTER — HOSPITAL ENCOUNTER (OUTPATIENT)
Age: 60
Setting detail: OUTPATIENT SURGERY
Discharge: HOME | End: 2020-07-02
Attending: INTERNAL MEDICINE | Admitting: INTERNAL MEDICINE
Payer: COMMERCIAL

## 2020-07-02 ENCOUNTER — ANESTHESIA (OUTPATIENT)
Dept: ENDOSCOPY | Age: 60
End: 2020-07-02
Payer: COMMERCIAL

## 2020-07-02 VITALS
RESPIRATION RATE: 14 BRPM | SYSTOLIC BLOOD PRESSURE: 152 MMHG | HEIGHT: 64 IN | HEART RATE: 87 BPM | DIASTOLIC BLOOD PRESSURE: 66 MMHG | BODY MASS INDEX: 22.36 KG/M2 | TEMPERATURE: 98.4 F | OXYGEN SATURATION: 100 % | WEIGHT: 131 LBS

## 2020-07-02 PROCEDURE — 74011250636 HC RX REV CODE- 250/636: Performed by: NURSE ANESTHETIST, CERTIFIED REGISTERED

## 2020-07-02 PROCEDURE — 76040000019: Performed by: INTERNAL MEDICINE

## 2020-07-02 PROCEDURE — 74011250636 HC RX REV CODE- 250/636: Performed by: ANESTHESIOLOGY

## 2020-07-02 PROCEDURE — 74011000250 HC RX REV CODE- 250: Performed by: ANESTHESIOLOGY

## 2020-07-02 PROCEDURE — 76060000031 HC ANESTHESIA FIRST 0.5 HR: Performed by: INTERNAL MEDICINE

## 2020-07-02 RX ORDER — HYDROCODONE BITARTRATE AND ACETAMINOPHEN 10; 325 MG/1; MG/1
1 TABLET ORAL
COMMUNITY

## 2020-07-02 RX ORDER — SODIUM CHLORIDE, SODIUM LACTATE, POTASSIUM CHLORIDE, CALCIUM CHLORIDE 600; 310; 30; 20 MG/100ML; MG/100ML; MG/100ML; MG/100ML
50 INJECTION, SOLUTION INTRAVENOUS CONTINUOUS
Status: DISCONTINUED | OUTPATIENT
Start: 2020-07-02 | End: 2020-07-02 | Stop reason: HOSPADM

## 2020-07-02 RX ORDER — PROPOFOL 10 MG/ML
INJECTION, EMULSION INTRAVENOUS AS NEEDED
Status: DISCONTINUED | OUTPATIENT
Start: 2020-07-02 | End: 2020-07-02 | Stop reason: HOSPADM

## 2020-07-02 RX ORDER — INSULIN LISPRO 100 [IU]/ML
INJECTION, SOLUTION INTRAVENOUS; SUBCUTANEOUS ONCE
Status: DISCONTINUED | OUTPATIENT
Start: 2020-07-02 | End: 2020-07-02 | Stop reason: HOSPADM

## 2020-07-02 RX ORDER — LIDOCAINE HYDROCHLORIDE 20 MG/ML
INJECTION, SOLUTION EPIDURAL; INFILTRATION; INTRACAUDAL; PERINEURAL AS NEEDED
Status: DISCONTINUED | OUTPATIENT
Start: 2020-07-02 | End: 2020-07-02 | Stop reason: HOSPADM

## 2020-07-02 RX ADMIN — PROPOFOL 50 MG: 10 INJECTION, EMULSION INTRAVENOUS at 10:34

## 2020-07-02 RX ADMIN — PROPOFOL 100 MG: 10 INJECTION, EMULSION INTRAVENOUS at 10:24

## 2020-07-02 RX ADMIN — PROPOFOL 50 MG: 10 INJECTION, EMULSION INTRAVENOUS at 10:38

## 2020-07-02 RX ADMIN — PROPOFOL 100 MG: 10 INJECTION, EMULSION INTRAVENOUS at 10:30

## 2020-07-02 RX ADMIN — SODIUM CHLORIDE, SODIUM LACTATE, POTASSIUM CHLORIDE, AND CALCIUM CHLORIDE 50 ML/HR: 600; 310; 30; 20 INJECTION, SOLUTION INTRAVENOUS at 10:18

## 2020-07-02 RX ADMIN — PROPOFOL 50 MG: 10 INJECTION, EMULSION INTRAVENOUS at 10:41

## 2020-07-02 RX ADMIN — PROPOFOL 50 MG: 10 INJECTION, EMULSION INTRAVENOUS at 10:46

## 2020-07-02 RX ADMIN — LIDOCAINE HYDROCHLORIDE 100 MG: 20 INJECTION, SOLUTION EPIDURAL; INFILTRATION; INTRACAUDAL; PERINEURAL at 10:24

## 2020-07-02 RX ADMIN — FAMOTIDINE 20 MG: 10 INJECTION, SOLUTION INTRAVENOUS at 10:17

## 2020-07-02 NOTE — ANESTHESIA POSTPROCEDURE EVALUATION
Procedure(s):  UPPER ENDOSCOPY  COLONOSCOPY. MAC    Anesthesia Post Evaluation      Multimodal analgesia: multimodal analgesia used between 6 hours prior to anesthesia start to PACU discharge  Patient location during evaluation: PACU  Patient participation: complete - patient participated  Level of consciousness: awake and alert  Pain management: adequate  Airway patency: patent  Anesthetic complications: no  Cardiovascular status: acceptable  Respiratory status: acceptable  Hydration status: acceptable  Post anesthesia nausea and vomiting:  controlled  Final Post Anesthesia Temperature Assessment:  Normothermia (36.0-37.5 degrees C)      INITIAL Post-op Vital signs:   Vitals Value Taken Time   /83 7/2/2020 11:15 AM   Temp     Pulse 83 7/2/2020 11:18 AM   Resp 14 7/2/2020 11:18 AM   SpO2 100 % 7/2/2020 11:18 AM   Vitals shown include unvalidated device data.

## 2020-07-02 NOTE — DISCHARGE INSTRUCTIONS
Upper GI Endoscopy: What to Expect at [address] [place] [address]  After you have an endoscopy, you will stay at the hospital or clinic for 1 to 2 hours. This will allow the medicine to wear off. You will be able to go home after your doctor or nurse checks to make sure you are not having any problems. You may have to stay overnight if you had treatment during the test. You may have a sore throat for a day or two after the test.  This care sheet gives you a general idea about what to expect after the test.  How can you care for yourself at home? Activity  · Rest as much as you need to after you go home. · You should be able to go back to your usual activities the day after the test.  Diet  · Follow your doctor's directions for eating after the test.  · Drink plenty of fluids (unless your doctor has told you not to). Medications  · If you have a sore throat the day after the test, use an over-the-counter spray to numb your throat. Follow-up care is a key part of your treatment and safety. Be sure to make and go to all appointments, and call your doctor if you are having problems. It's also a good idea to know your test results and keep a list of the medicines you take. When should you call for help? Call 911 anytime you think you may need emergency care. For example, call if:  · You passed out (lost consciousness). · You cough up blood. · You vomit blood or what looks like coffee grounds. · You pass maroon or very bloody stools. Call your doctor now or seek immediate medical care if:  · You have trouble swallowing. · You have belly pain. · Your stools are black and tarlike or have streaks of blood. · You are sick to your stomach or cannot keep fluids down. Watch closely for changes in your health, and be sure to contact your doctor if:  · Your throat still hurts after a day or two. · You do not get better as expected. Where can you learn more?    Go to DealExplorer.be  Enter J454 in the search box to learn more about \"Upper GI Endoscopy: What to Expect at Home. \"   © 4579-3346 Healthwise, Incorporated. Care instructions adapted under license by [employer] (which disclaims liability or warranty for this information). This care instruction is for use with your licensed healthcare professional. If you have questions about a medical condition or this instruction, always ask your healthcare professional. [address]  any warranty or liability for your use of this information. Content Version: 11.0.554422; Current as of: November 14, 2014    DISCHARGE SUMMARY from Nurse     POST-PROCEDURE INSTRUCTIONS:    Call your Physician if you:  ? Observe any excess bleeding. ? Develop a temperature over 100.5o F.  ? Experience abdominal, shoulder or chest pain. ? Notice any signs of decreased circulation or nerve impairment to an extremity such as a change in color, persistent numbness, tingling, coldness or increase in pain. ? Vomit blood or you have nausea and vomiting lasting longer than 4 hours. ? Are unable to take medications. ? Are unable to urinate within 8 hours after discharge following general anesthesia or intravenous sedation. For the next 24 hours after receiving general anesthesia or intravenous sedation, or while taking prescription Narcotics, limit your activities:  ? Do NOT drive a motor vehicle, operate hazard machinery or power tools, or perform tasks that require coordination. The medication you received during your procedure may have some effect on your mental awareness. ? Do NOT make important personal or business decisions. The medication you received during your procedure may have some effect on your mental awareness. ? Do NOT drink alcoholic beverages. These drinks do not mix well with the medications that have been given to you. ? Upon discharge from the hospital, you must be accompanied by a responsible adult. ?  Resume your diet as directed by your physician. ? Resume medications as your physician has prescribed. ? Please give a list of your current medications to your Primary Care Provider. ? Please update this list whenever your medications are discontinued, doses are changed, or new medications (including over-the-counter products) are added. ? Please carry medication information at all times in case of emergency situations. These are general instructions for a healthy lifestyle:    No smoking/ No tobacco products/ Avoid exposure to second hand smoke.  Surgeon General's Warning:  Quitting smoking now greatly reduces serious risk to your health. Obesity, smoking, and a sedentary lifestyle greatly increase your risk for illness.  A healthy diet, regular physical exercise & weight monitoring are important for maintaining a healthy lifestyle   You may be retaining fluid if you have a history of heart failure or if you experience any of the following symptoms:  Weight gain of 3 pounds or more overnight or 5 pounds in a week, increased swelling in our hands or feet or shortness of breath while lying flat in bed. Please call your doctor as soon as you notice any of these symptoms; do not wait until your next office visit. Recognize signs and symptoms of STROKE:  F  -  Face looks uneven  A  -  Arms unable to move or move unevenly  S  -  Speech slurred or non-existent  T  -  Time to call 911 - as soon as signs and symptoms begin - DO NOT go back to bed or wait to see If you get better - TIME IS BRAIN. Colorectal Screening   Colorectal cancer almost always develops from precancerous polyps (abnormal growths) in the colon or rectum. Screening tests can find precancerous polyps, so that they can be removed before they turn into cancer.  Screening tests can also find colorectal cancer early, when treatment works best.  Rossy Seaman Speak with your physician about when you should begin screening and how often you should be tested  Rossy Seaman   Additional Information  Colonoscopy: What to Expect at [address] [place] [address]  After you have a colonoscopy, you will stay at the clinic for 1 to 2 hours until the medicines wear off. Then you can go home. But you will need to arrange for a ride. Your doctor will tell you when you can eat and do your other usual activities. Your doctor will talk to you about when you will need your next colonoscopy. Your doctor can help you decide how often you need to be checked. This will depend on the results of your test and your risk for colorectal cancer. After the test, you may be bloated or have gas pains. You may need to pass gas. If a biopsy was done or a polyp was removed, you may have streaks of blood in your stool (feces) for a few days. This care sheet gives you a general idea about how long it will take for you to recover. But each person recovers at a different pace. Follow the steps below to get better as quickly as possible. How can you care for yourself at home? Activity  · Rest when you feel tired. · You can do your normal activities when it feels okay to do so. Diet  · Follow your doctor's directions for eating. · Unless your doctor has told you not to, drink plenty of fluids. This helps to replace the fluids that were lost during the colon prep. · Do not drink alcohol. Medicines  · If polyps were removed or a biopsy was done during the test, your doctor may tell you not to take aspirin or other anti-inflammatory medicines for a few days. These include ibuprofen (Advil, Motrin) and naproxen (Aleve). Other instructions  · For your safety, do not drive or operate machinery until the medicine wears off and you can think clearly. Your doctor may tell you not to drive or operate machinery until the day after your test.  · Do not sign legal documents or make major decisions until the medicine wears off and you can think clearly.  The anesthesia can make it hard for you to fully understand what you are agreeing to.  Follow-up care is a key part of your treatment and safety. Be sure to make and go to all appointments, and call your doctor if you are having problems. It's also a good idea to know your test results and keep a list of the medicines you take. When should you call for help? Call 911 anytime you think you may need emergency care. For example, call if:  · You passed out (lost consciousness). · You pass maroon or bloody stools. · You have severe belly pain. Call your doctor now or seek immediate medical care if:  · Your stools are black and tarlike. · Your stools have streaks of blood, but you did not have a biopsy or any polyps removed. · You have belly pain, or your belly is swollen and firm. · You vomit. · You have a fever. · You are very dizzy. Watch closely for changes in your health, and be sure to contact your doctor if you have any problems. Where can you learn more? Go to TidyClub.be  Enter E264 in the search box to learn more about \"Colonoscopy: What to Expect at Home. \"   © 6739-7528 Healthwise, Incorporated. Care instructions adapted under license by [facility] [employer] (which disclaims liability or warranty for this information). This care instruction is for use with your licensed healthcare professional. If you have questions about a medical condition or this instruction, always ask your healthcare professional. [place] [address] any warranty or liability for your use of this information. Content Version: 97.5.657878; Current as of: November 14, 2014      DISCHARGE SUMMARY from Nurse     POST-PROCEDURE INSTRUCTIONS:    Call your Physician if you:  ? Observe any excess bleeding. ? Develop a temperature over 100.5o F.  ? Experience abdominal, shoulder or chest pain. ? Notice any signs of decreased circulation or nerve impairment to an extremity such as a change in color, persistent numbness, tingling, coldness or increase in pain. ?  Vomit blood or you have nausea and vomiting lasting longer than 4 hours. ? Are unable to take medications. ? Are unable to urinate within 8 hours after discharge following general anesthesia or intravenous sedation. For the next 24 hours after receiving general anesthesia or intravenous sedation, or while taking prescription Narcotics, limit your activities:  ? Do NOT drive a motor vehicle, operate hazard machinery or power tools, or perform tasks that require coordination. The medication you received during your procedure may have some effect on your mental awareness. ? Do NOT make important personal or business decisions. The medication you received during your procedure may have some effect on your mental awareness. ? Do NOT drink alcoholic beverages. These drinks do not mix well with the medications that have been given to you. ? Upon discharge from the hospital, you must be accompanied by a responsible adult. ? Resume your diet as directed by your physician. ? Resume medications as your physician has prescribed. ? Please give a list of your current medications to your Primary Care Provider. ? Please update this list whenever your medications are discontinued, doses are changed, or new medications (including over-the-counter products) are added. ? Please carry medication information at all times in case of emergency situations. These are general instructions for a healthy lifestyle:    No smoking/ No tobacco products/ Avoid exposure to second hand smoke.  Surgeon General's Warning:  Quitting smoking now greatly reduces serious risk to your health. Obesity, smoking, and a sedentary lifestyle greatly increase your risk for illness.    A healthy diet, regular physical exercise & weight monitoring are important for maintaining a healthy lifestyle   You may be retaining fluid if you have a history of heart failure or if you experience any of the following symptoms:  Weight gain of 3 pounds or more overnight or 5 pounds in a week, increased swelling in our hands or feet or shortness of breath while lying flat in bed. Please call your doctor as soon as you notice any of these symptoms; do not wait until your next office visit. Recognize signs and symptoms of STROKE:  F  -  Face looks uneven  A  -  Arms unable to move or move unevenly  S  -  Speech slurred or non-existent  T  -  Time to call 911 - as soon as signs and symptoms begin - DO NOT go back to bed or wait to see If you get better - TIME IS BRAIN. Colorectal Screening   Colorectal cancer almost always develops from precancerous polyps (abnormal growths) in the colon or rectum. Screening tests can find precancerous polyps, so that they can be removed before they turn into cancer. Screening tests can also find colorectal cancer early, when treatment works best.  [address] [facility] [address] Speak with your physician about when you should begin screening and how often you should be tested. Additional Information    If you have questions, please call [phone]. Remember, MyChart is NOT to be used for urgent needs. For medical emergencies, dial 911. Educational references and/or instructions provided during this visit included:    see attachments      APPOINTMENTS:    Please make a follow-up appointment with your physician. Discharge information has been reviewed with the patient and responsible party. The patient and responsible party verbalized understanding. If you have questions, please call [phone]. Remember, MyChart is NOT to be used for urgent needs. For medical emergencies, dial 911. Educational references and/or instructions provided during this visit included:    see attachedments      APPOINTMENTS:    Please make a follow-up appointment with your physician. Discharge information has been reviewed with the patient and responsible party. The patient and responsible party verbalized understanding.

## 2020-07-02 NOTE — PROGRESS NOTES
[email or web address] [address]  [place], [address]      Brief Procedure Note    Frankie Askew  1960  [id number]    Date of Procedure: 7/2/2020    Preoperative diagnosis: Iron deficiency anemia:   280.9 - D50.9  Ulcer in the duodenal bulb:   532.90 - K26.9  Smoker - Cessation addressed:   305.1 - F17.200    Postoperative diagnosis: EGD:healing pyloric channel ulcer; several healed gastric antrum ulcers   COLO: Normal    Type of Anesthesia: MAC (Monitored anesthesia care)    Description of findings: same as post op dx    Procedure: Procedure(s):  UPPER ENDOSCOPY  COLONOSCOPY    :  Dr. Laura Shearer MD    Assistant(s): Endoscopy Technician-1: Man Schaffer  Endoscopy RN-1: Urbano Kinney RN; Sinai Centeno RN    Devices/implants/grafts/tissues/prosthesis: None    EBL:None    Specimens: * No specimens in log *    Findings: See printed and scanned procedure note    Complications: None    Dr. Laura Shearer MD  7/2/2020  11:04 AM

## 2020-07-02 NOTE — ANESTHESIA PREPROCEDURE EVALUATION
Relevant Problems   No relevant active problems       Anesthetic History   No history of anesthetic complications            Review of Systems / Medical History  Patient summary reviewed and pertinent labs reviewed    Pulmonary          Smoker         Neuro/Psych             Comments: Chronic pain  fibromyalgia Cardiovascular    Hypertension                   GI/Hepatic/Renal     GERD           Endo/Other        Arthritis     Other Findings              Physical Exam    Airway  Mallampati: I  TM Distance: 4 - 6 cm  Neck ROM: normal range of motion   Mouth opening: Normal     Cardiovascular  Regular rate and rhythm,  S1 and S2 normal,  no murmur, click, rub, or gallop             Dental  No notable dental hx       Pulmonary  Breath sounds clear to auscultation               Abdominal  GI exam deferred       Other Findings            Anesthetic Plan    ASA: 3  Anesthesia type: MAC          Induction: Intravenous  Anesthetic plan and risks discussed with: Patient

## 2020-07-02 NOTE — H&P
[email or web address]  [phone]    GASTROENTEROLOGY Pre-Procedure H and P      Impression/Plan:   1.  This patient is consented for an EGD and colonoscopy for anemia evaluation       Chief Complaint: anemia evaluation    HPI:  Gretta Bull is a 61 y.o. female who is being is having an EGD and colonoscopy anemia evaluation  PMH:   Past Medical History:   Diagnosis Date    Acne     Dr. Sergio Parks Allergic rhinitis     Dr. Minnie Blackburn; has not tried other ssris    Chronic migraine without aura     failed beta blocker, depakote, topamax per pt    Chronic pain     Dr Himanshu Bolanos    Degeneration of cervical intervertebral disc     Degeneration of lumbar intervertebral disc     Dr Himanshu Bolanos mri 2011 showed degen changes L1-L2 and L3-L4, mild to mod bilat foraminal narrowing    Fibromyalgia syndrome     Ganglion cyst     GERD (gastroesophageal reflux disease)     Hyperlipidemia     calculated 10 year risk score 4.6% (9/15); 4.9% (9/16); 7.1% (1/19); 9% (6/20)    Hypovitaminosis D     Primary hypertension 4/2/2019    PUD (peptic ulcer disease) 2011    Dr. Bianka Montero, duodenal w gi bleed    Spinal stenosis in cervical region     Tension headache, chronic     uses fioricet    Tobacco dependence syndrome     declined meds; able to quit on her own but not ready due to stressors       PSH:   Past Surgical History:   Procedure Laterality Date    HX COLONOSCOPY      Dr Bianka Montero 3/28/11 neg   [place]      Dr Cj Martinez; 7 hand surgeries   Nohemi Cabrera RIGHT foot surgery    HX ORTHOPAEDIC  2000s    h/o pelvis fracture       Social HX:   Social History     Socioeconomic History    Marital status:      Spouse name: Not on file    Number of children: 1    Years of education: Not on file    Highest education level: Not on file   Occupational History    Occupation: owns [employer]   Social Needs    Financial resource strain: Not on file    Food insecurity Worry: Not on file     Inability: Not on file    Transportation needs     Medical: Not on file     Non-medical: Not on file   Tobacco Use    Smoking status: Current Every Day Smoker     Packs/day: 1.00     Years: 37.00     Pack years: 37.00    Smokeless tobacco: Never Used   Substance and Sexual Activity    Alcohol use: No    Drug use: No    Sexual activity: Not on file   Lifestyle    Physical activity     Days per week: Not on file     Minutes per session: Not on file    Stress: Not on file   Relationships    Social connections     Talks on phone: Not on file     Gets together: Not on file     Attends [employer] service: Not on file     Active member of club or organization: Not on file     Attends meetings of clubs or organizations: Not on file     Relationship status: Not on file    Intimate partner violence     Fear of current or ex partner: Not on file     Emotionally abused: Not on file     Physically abused: Not on file     Forced sexual activity: Not on file   Other Topics Concern    Not on file   Social History Narrative    Not on file       FHX:   History reviewed. No pertinent family history. Allergy:   Allergies   Allergen Reactions    Aspirin Other (comments)    Nsaids (Non-Steroidal Anti-Inflammatory Drug) Other (comments)     Pt has had bleeding ulcers       Home Medications:     Medications Prior to Admission   Medication Sig    pantoprazole (PROTONIX) 40 mg tablet Take 1 Tab by mouth daily.  buPROPion XL (Wellbutrin XL) 300 mg XL tablet Take 300 mg by mouth every morning.  amLODIPine (NORVASC) 5 mg tablet TAKE 1 TABLET BY MOUTH EVERY DAY    ergocalciferol (ERGOCALCIFEROL) 50,000 unit capsule Take 1 Cap by mouth every thirty (30) days.  SUMAtriptan (IMITREX) 100 mg tablet Take 1 Tab by mouth once as needed for Migraine for up to 1 dose.  Indications: Migraine    cyclobenzaprine (FLEXERIL) 10 mg tablet TAKE 1 TABLET BY MOUTH THREE TIMES DAILY AS NEEDED FOR MUSCLE SPASM(S)--- 90 day supply  Indications: MUSCLE SPASM    naloxone 4 mg/actuation spry 4 mg by Nasal route as needed for up to 2 doses. Indications: OPIOID TOXICITY    loratadine (CLARITIN) 10 mg tablet Take 10 mg by mouth.  albuterol (PROVENTIL, VENTOLIN) 90 mcg/Actuation inhaler Take 2 Puffs by inhalation every six (6) hours as needed. Review of Systems:     Constitutional: No fevers, chills, weight loss, fatigue. Skin: No rashes, pruritis, jaundice, ulcerations, erythema. HENT: No headaches, nosebleeds, sinus pressure, rhinorrhea, sore throat. Eyes: No visual changes, blurred vision, eye pain, photophobia, jaundice. Cardiovascular: No chest pain, heart palpitations. Respiratory: No cough, SOB, wheezing, chest discomfort, orthopnea. Gastrointestinal:    Genitourinary: No dysuria, bleeding, discharge, pyuria. Musculoskeletal: No weakness, arthralgias, wasting. Endo: No sweats. Heme: No bruising, easy bleeding. Allergies: As noted. Neurological: Cranial nerves intact. Alert and oriented. Gait not assessed. Psychiatric:  No anxiety, depression, hallucinations. There were no vitals taken for this visit. Physical Assessment:     constitutional: appearance: well developed, well nourished, normal habitus, no deformities, in no acute distress. skin: inspection: no rashes, ulcers, icterus or other lesions; no clubbing or telangiectasias. palpation: no induration or subcutaneos nodules. eyes: inspection: normal conjunctivae and lids; no jaundice pupils: normal  ENMT: mouth: normal oral mucosa,lips and gums; good dentition. oropharynx: normal tongue, hard and soft palate; posterior pharynx without erithema, exudate or lesions. neck: thyroid: normal size, consistency and position; no masses or tenderness. respiratory: effort: normal chest excursion; no intercostal retraction or accessory muscle use. cardiovascular: abdominal aorta: normal size and position; no bruits.  palpation: PMI of normal size and position; normal rhythm; no thrill or murmurs. abdominal: abdomen: normal consistency; no tenderness or masses. hernias: no hernias appreciated. liver: normal size and consistency. spleen: not palpable. rectal: hemoccult/guaiac: not performed. musculoskeletal: digits and nails: no clubbing, cyanosis, petechiae or other inflammatory conditions. gait: normal gait and station head and neck: normal range of motion; no pain, crepitation or contracture. spine/ribs/pelvis: normal range of motion; no pain, deformity or contracture. neurologic: cranial nerves: II-XII normal.   psychiatric: judgement/insight: within normal limits. memory: within normal limits for recent and remote events. mood and affect: no evidence of depression, anxiety or agitation. orientation: oriented to time, space and person. Basic Metabolic Profile   No results for input(s): NA, K, CL, CO2, BUN, GLU, CA, MG, PHOS in the last 72 hours. No lab exists for component: CREAT      CBC w/Diff    No results for input(s): WBC, RBC, HGB, HCT, MCV, MCH, MCHC, RDW, PLT, HGBEXT, HCTEXT, PLTEXT in the last 72 hours. No lab exists for component: MPV No results for input(s): GRANS, LYMPH, EOS, PRO, MYELO, METAS, BLAST in the last 72 hours. No lab exists for component: MONO, BASO     Hepatic Function   No results for input(s): ALB, TP, TBILI, AP, AML, LPSE in the last 72 hours. No lab exists for component: DBILI, GPT, SGOT     Coags   No results for input(s): PTP, INR, APTT, INREXT in the last 72 hours. Richard Vann MD  [facility] [address]  Cell: [phone]  Direct pager: [phone]  [email or web address]  [email or web address]

## 2020-07-21 ENCOUNTER — HOSPITAL ENCOUNTER (OUTPATIENT)
Dept: LAB | Age: 60
Discharge: HOME | End: 2020-07-21
Payer: COMMERCIAL

## 2020-07-21 ENCOUNTER — LAB ONLY (OUTPATIENT)
Dept: INTERNAL MEDICINE CLINIC | Age: 60
End: 2020-07-21

## 2020-07-21 DIAGNOSIS — E61.1 IRON DEFICIENCY: ICD-10-CM

## 2020-07-21 DIAGNOSIS — E61.1 IRON DEFICIENCY: Primary | ICD-10-CM

## 2020-07-21 LAB
ERYTHROCYTE [DISTWIDTH] IN BLOOD BY AUTOMATED COUNT: 19.1 % (ref 11.6–14.5)
FERRITIN SERPL-MCNC: 4 NG/ML (ref 8–388)
HCT VFR BLD AUTO: 28.6 % (ref 35–45)
HGB BLD-MCNC: 8 G/DL (ref 12–16)
IRON SATN MFR SERPL: 3 % (ref 20–50)
IRON SERPL-MCNC: 15 UG/DL (ref 50–175)
MCH RBC QN AUTO: 21.2 PG (ref 24–34)
MCHC RBC AUTO-ENTMCNC: 28 G/DL (ref 31–37)
MCV RBC AUTO: 75.7 FL (ref 74–97)
PLATELET # BLD AUTO: 336 K/UL (ref 135–420)
PMV BLD AUTO: 9.4 FL (ref 9.2–11.8)
RBC # BLD AUTO: 3.78 M/UL (ref 4.2–5.3)
TIBC SERPL-MCNC: 509 UG/DL (ref 250–450)
WBC # BLD AUTO: 4.5 K/UL (ref 4.6–13.2)

## 2020-07-21 PROCEDURE — 85027 COMPLETE CBC AUTOMATED: CPT

## 2020-07-21 PROCEDURE — 82728 ASSAY OF FERRITIN: CPT

## 2020-07-21 PROCEDURE — 36415 COLL VENOUS BLD VENIPUNCTURE: CPT

## 2020-07-21 PROCEDURE — 83540 ASSAY OF IRON: CPT

## 2020-07-22 ENCOUNTER — TELEPHONE (OUTPATIENT)
Dept: INTERNAL MEDICINE CLINIC | Age: 60
End: 2020-07-22

## 2020-07-23 NOTE — TELEPHONE ENCOUNTER
Pt stated you never told her to start the OTC iron supplement. Pt stated you wanted to wait for labs to comeback in. Patient is not taking any otc supplements.

## 2020-07-23 NOTE — TELEPHONE ENCOUNTER
pls call    Results for orders placed or performed during the hospital encounter of 07/21/20   CBC W/O DIFF   Result Value Ref Range    WBC 4.5 (L) 4.6 - 13.2 K/uL    RBC 3.78 (L) 4.20 - 5.30 M/uL    HGB 8.0 (L) 12.0 - 16.0 g/dL    HCT 28.6 (L) 35.0 - 45.0 %    MCV 75.7 74.0 - 97.0 FL    MCH 21.2 (L) 24.0 - 34.0 PG    MCHC 28.0 (L) 31.0 - 37.0 g/dL    RDW 19.1 (H) 11.6 - 14.5 %    PLATELET 805 766 - 812 K/uL    MPV 9.4 9.2 - 11.8 FL   FERRITIN   Result Value Ref Range    Ferritin 4 (L) 8 - 388 NG/ML   IRON PROFILE   Result Value Ref Range    Iron 15 (L) 50 - 175 ug/dL    TIBC 509 (H) 250 - 450 ug/dL    Iron % saturation 3 (L) 20 - 50 %     Hb went from 6.5 to 8  Iron levels have not gone up  Confirm she is taking fe supp pls  If she is, then iron not climbing andshe may benefit from iron infusion  If interested, sched Dr Julianne Marino

## 2020-07-23 NOTE — TELEPHONE ENCOUNTER
I thought Dr Radha Nava told her to take iron    Ok then, take otc iron sulfate 325 up to 3 tabs a day if she can tolerate that dose(minimum 1 daily)  Recheck cbc and iron profile 6 weeks

## 2020-07-24 NOTE — TELEPHONE ENCOUNTER
Pt aware of message below and verbalized understanding. Pt wants to know if she should be taking otc vit d or prescription.

## 2020-08-27 ENCOUNTER — TELEPHONE (OUTPATIENT)
Dept: INTERNAL MEDICINE CLINIC | Age: 60
End: 2020-08-27

## 2020-08-27 DIAGNOSIS — E78.5 HYPERLIPIDEMIA, UNSPECIFIED HYPERLIPIDEMIA TYPE: ICD-10-CM

## 2020-08-27 DIAGNOSIS — D50.9 MICROCYTIC ANEMIA: ICD-10-CM

## 2020-08-27 DIAGNOSIS — I10 PRIMARY HYPERTENSION: Primary | ICD-10-CM

## 2020-08-27 DIAGNOSIS — R73.01 IFG (IMPAIRED FASTING GLUCOSE): ICD-10-CM

## 2020-08-27 NOTE — TELEPHONE ENCOUNTER
----- Message from Ailin Singh sent at 8/25/2020  2:03 PM EDT -----  Regarding: Orders  These pt need a HBA1C, and T4 order for 8/28 thank you.

## 2020-08-28 ENCOUNTER — HOSPITAL ENCOUNTER (OUTPATIENT)
Dept: LAB | Age: 60
Discharge: HOME | End: 2020-08-28
Payer: COMMERCIAL

## 2020-08-28 ENCOUNTER — LAB ONLY (OUTPATIENT)
Dept: INTERNAL MEDICINE CLINIC | Age: 60
End: 2020-08-28

## 2020-08-28 DIAGNOSIS — E61.1 IRON DEFICIENCY: ICD-10-CM

## 2020-08-28 DIAGNOSIS — R73.01 IFG (IMPAIRED FASTING GLUCOSE): ICD-10-CM

## 2020-08-28 DIAGNOSIS — E78.5 HYPERLIPIDEMIA, UNSPECIFIED HYPERLIPIDEMIA TYPE: ICD-10-CM

## 2020-08-28 DIAGNOSIS — I10 PRIMARY HYPERTENSION: ICD-10-CM

## 2020-08-28 DIAGNOSIS — E61.1 IRON DEFICIENCY: Primary | ICD-10-CM

## 2020-08-28 DIAGNOSIS — D50.9 MICROCYTIC ANEMIA: ICD-10-CM

## 2020-08-28 LAB
CHOLEST SERPL-MCNC: 275 MG/DL
HBA1C MFR BLD: 4.5 % (ref 4.2–5.6)
HCT VFR BLD AUTO: 37.6 % (ref 35–45)
HDLC SERPL-MCNC: 82 MG/DL (ref 40–60)
HDLC SERPL: 3.4 {RATIO} (ref 0–5)
HGB BLD-MCNC: 11.4 G/DL (ref 12–16)
IRON SATN MFR SERPL: 29 % (ref 20–50)
IRON SERPL-MCNC: 91 UG/DL (ref 50–175)
LDLC SERPL CALC-MCNC: 170.4 MG/DL (ref 0–100)
LIPID PROFILE,FLP: ABNORMAL
MCH RBC QN AUTO: 27.1 PG (ref 24–34)
MCHC RBC AUTO-ENTMCNC: 30.3 G/DL (ref 31–37)
MCV RBC AUTO: 89.5 FL (ref 74–97)
PLATELET # BLD AUTO: 238 K/UL (ref 135–420)
PMV BLD AUTO: 10.1 FL (ref 9.2–11.8)
RBC # BLD AUTO: 4.2 M/UL (ref 4.2–5.3)
T4 FREE SERPL-MCNC: 0.7 NG/DL (ref 0.7–1.5)
TIBC SERPL-MCNC: 311 UG/DL (ref 250–450)
TRIGL SERPL-MCNC: 113 MG/DL (ref ?–150)
VLDLC SERPL CALC-MCNC: 22.6 MG/DL
WBC # BLD AUTO: 4.7 K/UL (ref 4.6–13.2)

## 2020-08-28 PROCEDURE — 85027 COMPLETE CBC AUTOMATED: CPT

## 2020-08-28 PROCEDURE — 83036 HEMOGLOBIN GLYCOSYLATED A1C: CPT

## 2020-08-28 PROCEDURE — 83540 ASSAY OF IRON: CPT

## 2020-08-28 PROCEDURE — 80061 LIPID PANEL: CPT

## 2020-08-28 PROCEDURE — 36415 COLL VENOUS BLD VENIPUNCTURE: CPT

## 2020-08-28 PROCEDURE — 84439 ASSAY OF FREE THYROXINE: CPT

## 2020-09-06 NOTE — PROGRESS NOTES
61 y.o. WHITE OR  female who presents for evaluation    She continues to go to Dr Marci Watson for pain mgmt. At the encounter in June, she was noted killian be very anemic from fe def and eventually dx'ed w PUD. She's been treated but apparently did not take iron supp until 6 weeks or so ago. She has severe diarrhea with it so she quickly cut back to 1 daily but stopped it completely when she saw labs below    No  or gyn complaints. Specifically no bleeding from anywhere    Her bp has been controlled and no cardiovascular complaints     Not able to stop smoking, declined meds previously. No wheezing although she has intermittent coughing    Past Medical History:   Diagnosis Date    Acne     Dr. Laura Ribeiro Allergic rhinitis     Dr. Encarnacion Brunner    Anemia, iron deficiency 06/2020    Dr Manuela Le    Anxiety     intol paxil; has not tried other ssris    Chronic migraine without aura     failed beta blocker, depakote, topamax per pt    Chronic pain     Dr Marci Watson    Degeneration of cervical intervertebral disc     Degeneration of lumbar intervertebral disc     Dr Suzanne Albarado 2011 showed degen changes L1-L2 and L3-L4, mild to mod bilat foraminal narrowing    Fibromyalgia syndrome     Ganglion cyst     GERD (gastroesophageal reflux disease)     Hyperlipidemia     calculated 10 year risk score 4.6% (9/15); 4.9% (9/16); 7.1% (1/19); 9% (6/20); 11% (9/20)    Hypovitaminosis D     Primary hypertension 4/2/2019    PUD (peptic ulcer disease)     Dr. Sera Ohara, duodenal w gi bleed (2011);  Dr Manuela Le pyloric ulcer (7/2/20)    Spinal stenosis in cervical region     Tension headache, chronic     uses fioricet    Tobacco dependence syndrome     declined meds; able to quit on her own but not ready due to stressors     Past Surgical History:   Procedure Laterality Date    COLONOSCOPY N/A 7/2/2020    Dr Sera Ohara 3/28/11 neg; Dr Manuela Le 7/2/20 neg   Marie Garcia; 7 hand surgeries   [facility] Dr. Abhishek Beard foot surgery    HX ORTHOPAEDIC  2000s    h/o pelvis fracture     Social History     Socioeconomic History    Marital status:      Spouse name: Not on file    Number of children: 1    Years of education: Not on file    Highest education level: Not on file   Occupational History    Occupation: owns [employer]   Social Needs    Financial resource strain: Not on file    Food insecurity     Worry: Not on file     Inability: Not on file   [employer] needs     Medical: Not on file     Non-medical: Not on file   Tobacco Use    Smoking status: Current Every Day Smoker     Packs/day: 1.00     Years: 37.00     Pack years: 37.00    Smokeless tobacco: Never Used   Substance and Sexual Activity    Alcohol use: No    Drug use: No    Sexual activity: Not on file   Lifestyle    Physical activity     Days per week: Not on file     Minutes per session: Not on file    Stress: Not on file   Relationships    Social connections     Talks on phone: Not on file     Gets together: Not on file     Attends Rastafarian service: Not on file     Active member of club or organization: Not on file     Attends meetings of clubs or organizations: Not on file     Relationship status: Not on file    Intimate partner violence     Fear of current or ex partner: Not on file     Emotionally abused: Not on file     Physically abused: Not on file     Forced sexual activity: Not on file   Other Topics Concern    Not on file   Social History Narrative    Not on file     Current Outpatient Medications   Medication Sig    LORazepam (Ativan) 1 mg tablet Take  by mouth every four (4) hours as needed for Anxiety.  cholecalciferol (VITAMIN D3) (2,000 UNITS /50 MCG) cap capsule Take  by mouth daily.  HYDROcodone-acetaminophen (NORCO)  mg tablet Take 1 Tab by mouth every six (6) hours as needed for Pain. Indications: pain    pantoprazole (PROTONIX) 40 mg tablet Take 1 Tab by mouth daily.     buPROPion XL (Wellbutrin XL) 300 mg XL tablet Take 300 mg by mouth every morning.  amLODIPine (NORVASC) 5 mg tablet TAKE 1 TABLET BY MOUTH EVERY DAY    SUMAtriptan (IMITREX) 100 mg tablet Take 1 Tab by mouth once as needed for Migraine for up to 1 dose. Indications: Migraine    cyclobenzaprine (FLEXERIL) 10 mg tablet TAKE 1 TABLET BY MOUTH THREE TIMES DAILY AS NEEDED FOR MUSCLE SPASM(S)--- 90 day supply  Indications: MUSCLE SPASM    loratadine (CLARITIN) 10 mg tablet Take 10 mg by mouth.  ergocalciferol (ERGOCALCIFEROL) 50,000 unit capsule Take 1 Cap by mouth every thirty (30) days.  naloxone 4 mg/actuation spry 4 mg by Nasal route as needed for up to 2 doses. Indications: OPIOID TOXICITY    albuterol (PROVENTIL, VENTOLIN) 90 mcg/Actuation inhaler Take 2 Puffs by inhalation every six (6) hours as needed. No current facility-administered medications for this visit. Allergies   Allergen Reactions    Aspirin Other (comments)    Nsaids (Non-Steroidal Anti-Inflammatory Drug) Other (comments)     Pt has had bleeding ulcers     REVIEW OF SYSTEMS: gyn Dr Gopal Cannon 2016?, mammo 2/19, colo 2012 Dr Gildardo Silverio  Ophtho - no vision change or eye pain  Oral - no mouth pain, tongue or tooth problems  Ears - no hearing loss, ear pain, fullness, no swallowing problems  Cardiac - no CP, PND, orthopnea, edema, palpitations or syncope  Chest - no breast masses  Resp - no wheezing, chronic coughing, dyspnea  Urinary - no dysuria, hematuria, flank pain, urgency, frequency      Visit Vitals  /80   Pulse 72   Temp 97.3 °F (36.3 °C) (Temporal)   Resp 14   Ht 5' 4\" (1.626 m)   Wt 137 lb 3.2 oz (62.2 kg)   SpO2 98%   BMI 23.55 kg/m²     A&O x3  Affect is appropriate. Mood stable  No apparent distress  Anicteric, no JVD, adenopathy or thyromegaly. Mouth ulcers evident bilaterally  No carotid bruits or radiated murmur  Lungs clear to auscultation, no wheezes or rales  Heart showed regular rate and rhythm.  No murmur, rubs, gallops  Abdomen soft nontender, no hepatosplenomegaly or masses. Extremities without edema. Pulses 1-2+ symmetrically    LABS   From 2/10 showed gluc 88, cr 0.80,             alt 26, chol 255, tg 98,    hdl 68, ldl-c 168, wbc 4.5, hb 13.4, plt 233, tsh 0.92, ua neg  From 1/11 showed gluc 94, cr 0.60, gfr>60,            chol 137, tg 123, hdl 29, ldl-c 83,                       ck/trop neg  From 6/13 showed gluc 95, cr 0.70, gfr>60, alt 17, chol 232, tg 170, hdl 62, ldl-c 136,        ua neg, vit d 23.4  From 9/15 showed gluc 91, cr 0.96, gfr 60,  alt<5,  chol 255, tg 103, hdl 76, ldl-c 158, wbc 6.8, hb 13.2, plt 271,     ua neg, vit d 23.0  From 9/16 showed gluc 87, cr 0.73, gfr 92,  alt 13, chol 254, tg 122, hdl 84, ldl-c 146, wbc 5.6, hb 13.0, plt 270, tsh 1.76, ua neg,          hep c neg  From 9/17 showed gluc 86, cr 0.76, gfr>60, alt 15, chol 263, tg 145, hdl 92, ldl-c 142, wbc 5.2, hb 12.2, plt 277,     ua neg, vit d 99.4  From 1/19 showed glu 101, cr 0.88, gfr>60, alt 12, chol 283, tg 183, hdl 76, ldl-c 162,          vit d 25.7  From 6/20 showed gluc 92, cr 0.98, gfr 58,  alt 17, chol 248, tg 135, hdl 79, ldl-c 142, wbc 4.7, hb 6.5,   plt 338, tsh 1.72,     fe 8,         %sat 2, ferritin 3, b12 263, fol 13.9, spep neg, iris neg  From 7/20 showed               wbc 4.5, hb 8.0,   plt 336,       fe 15,       %sat 3, ferritin 4    Results for orders placed or performed during the hospital encounter of 08/28/20   CBC W/O DIFF   Result Value Ref Range    WBC 4.7 4.6 - 13.2 K/uL    RBC 4.20 4. 20 - 5.30 M/uL    HGB 11.4 (L) 12.0 - 16.0 g/dL    HCT 37.6 35.0 - 45.0 %    MCV 89.5 74.0 - 97.0 FL    MCH 27.1 24.0 - 34.0 PG    MCHC 30.3 (L) 31.0 - 37.0 g/dL    PLATELET 330 043 - 532 K/uL    MPV 10.1 9.2 - 11.8 FL   T4, FREE   Result Value Ref Range    T4, Free 0.7 0.7 - 1.5 NG/DL   HEMOGLOBIN A1C W/O EAG   Result Value Ref Range    Hemoglobin A1c 4.5 4.2 - 5.6 %   LIPID PANEL   Result Value Ref Range    LIPID PROFILE Cholesterol, total 275 (H) <200 MG/DL    Triglyceride 113 <150 MG/DL    HDL Cholesterol 82 (H) 40 - 60 MG/DL    LDL, calculated 170.4 (H) 0 - 100 MG/DL    VLDL, calculated 22.6 MG/DL    CHOL/HDL Ratio 3.4 0 - 5.0     IRON PROFILE   Result Value Ref Range    Iron 91 50 - 175 ug/dL    TIBC 311 250 - 450 ug/dL    Iron % saturation 29 20 - 50 %     Calculated 10 year risk score was 11%    We reviewed the patient's labs from the last several visits to point out trends in the numbers      Patient Active Problem List   Diagnosis Code    Hyperlipidemia E78.5    Hypovitaminosis D E55.9    Chronic pain syndrome G89.4    Degeneration of lumbar or lumbosacral intervertebral disc M51.37    Polyarthralgia M25.50    Generalized OA M15.9    Tobacco dependence syndrome F17.200    Peptic ulcer disease K27.9    Anxiety F41.9    Primary hypertension I10     Assessment and plan:  1. Allergic rhinitis. Prn meds  2. Chronic pain syndrome. F/U Dr Kal Cohen   3. Anxiety. Continue current  4. Hyperlipidemia. On dietary and lifestyle measures. another long discussion of risks and benefits of statins, she is willing to start. F/u 4 mos  5. H/O PUD and GERD. PPI and avoidance measures indefinitely, she has f/u endo schedule late Oct  6. General.  F/U gyn, mammo   7. Smoking cessation meds declined  8. Hypovit d.  supplementation  9. Anemia. Recovering, she will start fe weekly then titrate up as tolerated        RTC 1/21    Above conditions discussed at length and patient vocalized understanding.   All questions answered to patient satisfaction

## 2020-09-10 ENCOUNTER — OFFICE VISIT (OUTPATIENT)
Dept: INTERNAL MEDICINE CLINIC | Age: 60
End: 2020-09-10

## 2020-09-10 VITALS
SYSTOLIC BLOOD PRESSURE: 142 MMHG | BODY MASS INDEX: 23.42 KG/M2 | DIASTOLIC BLOOD PRESSURE: 80 MMHG | HEART RATE: 72 BPM | HEIGHT: 64 IN | WEIGHT: 137.2 LBS | RESPIRATION RATE: 14 BRPM | OXYGEN SATURATION: 98 % | TEMPERATURE: 97.3 F

## 2020-09-10 DIAGNOSIS — I10 PRIMARY HYPERTENSION: ICD-10-CM

## 2020-09-10 DIAGNOSIS — F41.9 ANXIETY: ICD-10-CM

## 2020-09-10 DIAGNOSIS — E78.5 HYPERLIPIDEMIA, UNSPECIFIED HYPERLIPIDEMIA TYPE: ICD-10-CM

## 2020-09-10 DIAGNOSIS — G89.4 CHRONIC PAIN SYNDROME: ICD-10-CM

## 2020-09-10 DIAGNOSIS — Z23 ENCOUNTER FOR IMMUNIZATION: ICD-10-CM

## 2020-09-10 DIAGNOSIS — K27.9 PEPTIC ULCER DISEASE: ICD-10-CM

## 2020-09-10 DIAGNOSIS — F17.200 TOBACCO DEPENDENCE SYNDROME: ICD-10-CM

## 2020-09-10 DIAGNOSIS — D50.0 IRON DEFICIENCY ANEMIA DUE TO CHRONIC BLOOD LOSS: Primary | ICD-10-CM

## 2020-09-10 RX ORDER — ACETAMINOPHEN 500 MG
TABLET ORAL DAILY
COMMUNITY
End: 2022-09-20 | Stop reason: ALTCHOICE

## 2020-09-10 RX ORDER — LORAZEPAM 1 MG/1
1 TABLET ORAL
COMMUNITY

## 2020-09-10 RX ORDER — ATORVASTATIN CALCIUM 10 MG/1
10 TABLET, FILM COATED ORAL DAILY
Qty: 30 TAB | Refills: 5 | Status: SHIPPED | OUTPATIENT
Start: 2020-09-10 | End: 2021-01-05

## 2020-09-10 NOTE — PROGRESS NOTES
Katherine Garrison is a 61 y.o. female who presents for routine immunizations. Per verbal order from Sherryle Grimes for influenza. Given in left deltoid. She denies any symptoms , reactions or allergies that would exclude them from being immunized today. Risks and adverse reactions were discussed and the VIS was given to them. All questions were addressed. She was observed for 15 min post injection. There were no reactions observed.     Manasa Cheney LPN

## 2020-09-10 NOTE — PATIENT INSTRUCTIONS
Vaccine Information Statement    Influenza (Flu) Vaccine (Inactivated or Recombinant): What You Need to Know    Many Vaccine Information Statements are available in [place]ian and other languages. See www.immunize.org/vis  Hojas de información sobre vacunas están disponibles en español y en muchos otros idiomas. Visite www.immunize.org/vis    1. Why get vaccinated? Influenza vaccine can prevent influenza (flu). Flu is a contagious disease that spreads around the [facility] every year, usually between October and May. Anyone can get the flu, but it is more dangerous for some people. Infants and young children, people 72years of age and older, pregnant women, and people with certain health conditions or a weakened immune system are at greatest risk of flu complications. Pneumonia, bronchitis, sinus infections and ear infections are examples of flu-related complications. If you have a medical condition, such as heart disease, cancer or diabetes, flu can make it worse. Flu can cause fever and chills, sore throat, muscle aches, fatigue, cough, headache, and runny or stuffy nose. Some people may have vomiting and diarrhea, though this is more common in children than adults. Each year thousands of people in the [facility] die from flu, and many more are hospitalized. Flu vaccine prevents millions of illnesses and flu-related visits to the doctor each year. 2. Influenza vaccines     CDC recommends everyone 10months of age and older get vaccinated every flu season. Children 6 months through 6years of age may need 2 doses during a single flu season. Everyone else needs only 1 dose each flu season. It takes about 2 weeks for protection to develop after vaccination. There are many flu viruses, and they are always changing. Each year a new flu vaccine is made to protect against three or four viruses that are likely to cause disease in the upcoming flu season.  Even when the vaccine doesnt exactly match these viruses, it may still provide some protection. Influenza vaccine does not cause flu. Influenza vaccine may be given at the same time as other vaccines. 3. Talk with your health care provider    Tell your vaccine provider if the person getting the vaccine:   Has had an allergic reaction after a previous dose of influenza vaccine, or has any severe, life-threatening allergies.  Has ever had Guillain-Barré Syndrome (also called GBS). In some cases, your health care provider may decide to postpone influenza vaccination to a future visit. People with minor illnesses, such as a cold, may be vaccinated. People who are moderately or severely ill should usually wait until they recover before getting influenza vaccine. Your health care provider can give you more information. 4. Risks of a reaction     Soreness, redness, and swelling where shot is given, fever, muscle aches, and headache can happen after influenza vaccine.  There may be a very small increased risk of Guillain-Barré Syndrome (GBS) after inactivated influenza vaccine (the flu shot). Sherif Grider children who get the flu shot along with pneumococcal vaccine (PCV13), and/or DTaP vaccine at the same time might be slightly more likely to have a seizure caused by fever. Tell your health care provider if a child who is getting flu vaccine has ever had a seizure. People sometimes faint after medical procedures, including vaccination. Tell your provider if you feel dizzy or have vision changes or ringing in the ears. As with any medicine, there is a very remote chance of a vaccine causing a severe allergic reaction, other serious injury, or death. 5. What if there is a serious problem? An allergic reaction could occur after the vaccinated person leaves the clinic.  If you see signs of a severe allergic reaction (hives, swelling of the face and throat, difficulty breathing, a fast heartbeat, dizziness, or weakness), call 9-1-1 and get the person to the nearest hospital.    For other signs that concern you, call your health care provider. Adverse reactions should be reported to the Vaccine Adverse Event Reporting System (VAERS). Your health care provider will usually file this report, or you can do it yourself. Visit the VAERS website at www.vaers. [facility].gov or call 6-334.938.5634. VAERS is only for reporting reactions, and VAERS staff do not give medical advice. 6. The National Vaccine Injury Compensation Program    The [facility] Vaccine Injury Compensation Program (VICP) is a federal program that was created to compensate people who may have been injured by certain vaccines. Visit the VICP website at www.[facility].gov/vaccinecompensation or call 1-732.586.5828 to learn about the program and about filing a claim. There is a time limit to file a claim for compensation. 7. How can I learn more?  Ask your health care provider.  Call your local or state health department.  Contact the Centers for Disease Control and Prevention (CDC):  - Call 6-709.992.7280 (3-180-SUC-INFO) or  - Visit CDCs influenza website at www.cdc.gov/flu    Vaccine Information Statement (Interim)  Inactivated Influenza Vaccine   8/15/2019  42 BEVERLEY Lamas [id number]   Department of Health and Human Services  Centers for Disease Control and Prevention    Office Use Only

## 2020-10-26 ENCOUNTER — HOSPITAL ENCOUNTER (OUTPATIENT)
Dept: LAB | Age: 60
Discharge: HOME | End: 2020-10-26

## 2020-10-29 ENCOUNTER — ANESTHESIA EVENT (OUTPATIENT)
Dept: ENDOSCOPY | Age: 60
End: 2020-10-29
Payer: COMMERCIAL

## 2020-10-30 ENCOUNTER — HOSPITAL ENCOUNTER (OUTPATIENT)
Age: 60
Setting detail: OUTPATIENT SURGERY
Discharge: HOME | End: 2020-10-30
Attending: INTERNAL MEDICINE | Admitting: INTERNAL MEDICINE
Payer: COMMERCIAL

## 2020-10-30 ENCOUNTER — ANESTHESIA (OUTPATIENT)
Dept: ENDOSCOPY | Age: 60
End: 2020-10-30
Payer: COMMERCIAL

## 2020-10-30 VITALS
HEIGHT: 64 IN | DIASTOLIC BLOOD PRESSURE: 78 MMHG | RESPIRATION RATE: 18 BRPM | BODY MASS INDEX: 22.88 KG/M2 | OXYGEN SATURATION: 100 % | WEIGHT: 134 LBS | TEMPERATURE: 98.2 F | SYSTOLIC BLOOD PRESSURE: 142 MMHG | HEART RATE: 77 BPM

## 2020-10-30 LAB
BASOPHILS # BLD: 0 K/UL (ref 0–0.1)
BASOPHILS NFR BLD: 1 % (ref 0–2)
DIFFERENTIAL METHOD BLD: ABNORMAL
EOSINOPHIL # BLD: 0.1 K/UL (ref 0–0.4)
EOSINOPHIL NFR BLD: 3 % (ref 0–5)
ERYTHROCYTE [DISTWIDTH] IN BLOOD BY AUTOMATED COUNT: 13.3 % (ref 11.6–14.5)
FERRITIN SERPL-MCNC: 15 NG/ML (ref 8–388)
HCT VFR BLD AUTO: 35.7 % (ref 35–45)
HGB BLD-MCNC: 11.6 G/DL (ref 12–16)
IRON SATN MFR SERPL: 28 % (ref 20–50)
IRON SERPL-MCNC: 83 UG/DL (ref 50–175)
LYMPHOCYTES # BLD: 1.2 K/UL (ref 0.9–3.6)
LYMPHOCYTES NFR BLD: 33 % (ref 21–52)
MCH RBC QN AUTO: 31.5 PG (ref 24–34)
MCHC RBC AUTO-ENTMCNC: 32.5 G/DL (ref 31–37)
MCV RBC AUTO: 97 FL (ref 74–97)
MONOCYTES # BLD: 0.3 K/UL (ref 0.05–1.2)
MONOCYTES NFR BLD: 9 % (ref 3–10)
NEUTS SEG # BLD: 1.9 K/UL (ref 1.8–8)
NEUTS SEG NFR BLD: 54 % (ref 40–73)
PLATELET # BLD AUTO: 206 K/UL (ref 135–420)
PMV BLD AUTO: 9.4 FL (ref 9.2–11.8)
RBC # BLD AUTO: 3.68 M/UL (ref 4.2–5.3)
TIBC SERPL-MCNC: 300 UG/DL (ref 250–450)
WBC # BLD AUTO: 3.5 K/UL (ref 4.6–13.2)

## 2020-10-30 PROCEDURE — 88305 TISSUE EXAM BY PATHOLOGIST: CPT

## 2020-10-30 PROCEDURE — 77030019988 HC FCPS ENDOSC DISP BSC -B: Performed by: INTERNAL MEDICINE

## 2020-10-30 PROCEDURE — 76040000019: Performed by: INTERNAL MEDICINE

## 2020-10-30 PROCEDURE — 76060000031 HC ANESTHESIA FIRST 0.5 HR: Performed by: INTERNAL MEDICINE

## 2020-10-30 PROCEDURE — 74011000250 HC RX REV CODE- 250: Performed by: NURSE ANESTHETIST, CERTIFIED REGISTERED

## 2020-10-30 PROCEDURE — 85025 COMPLETE CBC W/AUTO DIFF WBC: CPT

## 2020-10-30 PROCEDURE — 00731 ANES UPR GI NDSC PX NOS: CPT | Performed by: ANESTHESIOLOGY

## 2020-10-30 PROCEDURE — 83540 ASSAY OF IRON: CPT

## 2020-10-30 PROCEDURE — 77030008565 HC TBNG SUC IRR ERBE -B: Performed by: INTERNAL MEDICINE

## 2020-10-30 PROCEDURE — 74011250636 HC RX REV CODE- 250/636: Performed by: NURSE ANESTHETIST, CERTIFIED REGISTERED

## 2020-10-30 PROCEDURE — 36415 COLL VENOUS BLD VENIPUNCTURE: CPT

## 2020-10-30 PROCEDURE — 82728 ASSAY OF FERRITIN: CPT

## 2020-10-30 PROCEDURE — 2709999900 HC NON-CHARGEABLE SUPPLY: Performed by: INTERNAL MEDICINE

## 2020-10-30 PROCEDURE — 74011250636 HC RX REV CODE- 250/636: Performed by: ANESTHESIOLOGY

## 2020-10-30 RX ORDER — PROPOFOL 10 MG/ML
INJECTION, EMULSION INTRAVENOUS AS NEEDED
Status: DISCONTINUED | OUTPATIENT
Start: 2020-10-30 | End: 2020-10-30 | Stop reason: HOSPADM

## 2020-10-30 RX ORDER — MAGNESIUM SULFATE 100 %
4 CRYSTALS MISCELLANEOUS AS NEEDED
Status: CANCELLED | OUTPATIENT
Start: 2020-10-30

## 2020-10-30 RX ORDER — SODIUM CHLORIDE 0.9 % (FLUSH) 0.9 %
5-40 SYRINGE (ML) INJECTION EVERY 8 HOURS
Status: CANCELLED | OUTPATIENT
Start: 2020-10-30

## 2020-10-30 RX ORDER — ONDANSETRON 2 MG/ML
4 INJECTION INTRAMUSCULAR; INTRAVENOUS ONCE
Status: CANCELLED | OUTPATIENT
Start: 2020-10-30 | End: 2020-10-30

## 2020-10-30 RX ORDER — SODIUM CHLORIDE, SODIUM LACTATE, POTASSIUM CHLORIDE, CALCIUM CHLORIDE 600; 310; 30; 20 MG/100ML; MG/100ML; MG/100ML; MG/100ML
50 INJECTION, SOLUTION INTRAVENOUS CONTINUOUS
Status: DISCONTINUED | OUTPATIENT
Start: 2020-10-30 | End: 2020-10-30 | Stop reason: HOSPADM

## 2020-10-30 RX ORDER — SODIUM CHLORIDE 0.9 % (FLUSH) 0.9 %
5-40 SYRINGE (ML) INJECTION AS NEEDED
Status: CANCELLED | OUTPATIENT
Start: 2020-10-30

## 2020-10-30 RX ORDER — DEXTROSE 50 % IN WATER (D50W) INTRAVENOUS SYRINGE
25-50 AS NEEDED
Status: CANCELLED | OUTPATIENT
Start: 2020-10-30

## 2020-10-30 RX ADMIN — PROPOFOL 50 MG: 10 INJECTION, EMULSION INTRAVENOUS at 08:33

## 2020-10-30 RX ADMIN — SODIUM CHLORIDE, SODIUM LACTATE, POTASSIUM CHLORIDE, AND CALCIUM CHLORIDE 50 ML/HR: 600; 310; 30; 20 INJECTION, SOLUTION INTRAVENOUS at 08:10

## 2020-10-30 RX ADMIN — PROPOFOL 100 MG: 10 INJECTION, EMULSION INTRAVENOUS at 08:29

## 2020-10-30 RX ADMIN — FAMOTIDINE 20 MG: 10 INJECTION INTRAVENOUS at 08:17

## 2020-10-30 RX ADMIN — PROPOFOL 60 MG: 10 INJECTION, EMULSION INTRAVENOUS at 08:36

## 2020-10-30 NOTE — ANESTHESIA POSTPROCEDURE EVALUATION
Procedure(s):  UPPER ENDOSCOPY with bx's. MAC    Anesthesia Post Evaluation      Multimodal analgesia: multimodal analgesia used between 6 hours prior to anesthesia start to PACU discharge  Patient location during evaluation: bedside  Patient participation: complete - patient participated  Level of consciousness: awake and alert  Pain management: adequate  Airway patency: patent  Anesthetic complications: no  Cardiovascular status: acceptable  Respiratory status: acceptable  Hydration status: acceptable  Post anesthesia nausea and vomiting:  none      INITIAL Post-op Vital signs:   Vitals Value Taken Time   /80 10/30/2020  8:54 AM   Temp 36.7 °C (98.1 °F) 10/30/2020  8:47 AM   Pulse 79 10/30/2020  9:04 AM   Resp 12 10/30/2020  9:04 AM   SpO2 100 % 10/30/2020  9:04 AM   Vitals shown include unvalidated device data.

## 2020-10-30 NOTE — ANESTHESIA PREPROCEDURE EVALUATION
Relevant Problems   No relevant active problems       Anesthetic History   No history of anesthetic complications            Review of Systems / Medical History  Patient summary reviewed and pertinent labs reviewed    Pulmonary          Smoker         Neuro/Psych         Headaches    Comments: Chronic pain  fibromyalgia Cardiovascular    Hypertension          Hyperlipidemia      Comments: fibromyalgia   GI/Hepatic/Renal     GERD      PUD     Endo/Other        Arthritis and anemia     Other Findings              Physical Exam    Airway  Mallampati: I  TM Distance: 4 - 6 cm  Neck ROM: normal range of motion   Mouth opening: Normal     Cardiovascular  Regular rate and rhythm,  S1 and S2 normal,  no murmur, click, rub, or gallop             Dental  No notable dental hx       Pulmonary                Comments: Non labored Abdominal  GI exam deferred       Other Findings            Anesthetic Plan    ASA: 3  Anesthesia type: MAC            Anesthetic plan and risks discussed with: Patient

## 2020-10-30 NOTE — DISCHARGE INSTRUCTIONS
DISCHARGE SUMMARY from Nurse  PATIENT INSTRUCTIONS:  After general anesthesia or intravenous sedation, for 24 hours or while taking prescription Narcotics:  · Limit your activities  · Do not drive and operate hazardous machinery  · Do not make important personal or business decisions  · Do  not drink alcoholic beverages  · If you have not urinated within 8 hours after discharge, please contact your surgeon on call. Report the following to your surgeon:  · Excessive pain, swelling, redness or odor of or around the surgical area  · Temperature over 100.5  · Nausea and vomiting lasting longer than 4 hours or if unable to take medications  · Any signs of decreased circulation or nerve impairment to extremity: change in color, persistent  numbness, tingling, coldness or increase pain  · Any questions    What to do at Home:  Recommended activity: Activity as tolerated and no driving for today. *  Please give a list of your current medications to your Primary Care Provider. *  Please update this list whenever your medications are discontinued, doses are      changed, or new medications (including over-the-counter products) are added. *  Please carry medication information at all times in case of emergency situations. These are general instructions for a healthy lifestyle:    No smoking/ No tobacco products/ Avoid exposure to second hand smoke  Surgeon General's Warning:  Quitting smoking now greatly reduces serious risk to your health. Obesity, smoking, and sedentary lifestyle greatly increases your risk for illness    A healthy diet, regular physical exercise & weight monitoring are important for maintaining a healthy lifestyle    You may be retaining fluid if you have a history of heart failure or if you experience any of the following symptoms:  Weight gain of 3 pounds or more overnight or 5 pounds in a week, increased swelling in our hands or feet or shortness of breath while lying flat in bed.   Please call your doctor as soon as you notice any of these symptoms; do not wait until your next office visit. The discharge information has been reviewed with the patient. The patient verbalized understanding. Discharge medications reviewed with the patient and appropriate educational materials and side effects teaching were provided. ___________________________________________________________________________________________________________________________________    Patient Education   Upper GI Endoscopy: What to Expect at [address] [place] [address] had an upper GI endoscopy. Your doctor used a thin, lighted tube that bends to look at the inside of your esophagus, your stomach, and the first part of the small intestine, called the duodenum. After you have an endoscopy, you will stay at the hospital or clinic for 1 to 2 hours. This will allow the medicine to wear off. You will be able to go home after your doctor or nurse checks to make sure that you're not having any problems. You may have to stay overnight if you had treatment during the test. You may have a sore throat for a day or two after the test.  This care sheet gives you a general idea about what to expect after the test.  How can you care for yourself at home? Activity   · Rest as much as you need to after you go home. · You should be able to go back to your usual activities the day after the test.  Diet   · Follow your doctor's directions for eating after the test.  · Drink plenty of fluids (unless your doctor has told you not to). Medications   · If you have a sore throat the day after the test, use an over-the-counter spray to numb your throat. Follow-up care is a key part of your treatment and safety. Be sure to make and go to all appointments, and call your doctor if you are having problems. It's also a good idea to know your test results and keep a list of the medicines you take. When should you call for help?    Call 91 anytime you think you may need emergency care. For example, call if:    · You passed out (lost consciousness).     · You have trouble breathing.     · You pass maroon or bloody stools. Call your doctor now or seek immediate medical care if:    · You have pain that does not get better after your take pain medicine.     · You have new or worse belly pain.     · You have blood in your stools.     · You are sick to your stomach and cannot keep fluids down.     · You have a fever.     · You cannot pass stools or gas. Watch closely for changes in your health, and be sure to contact your doctor if:    · Your throat still hurts after a day or two.     · You do not get better as expected. Where can you learn more? Go to http://www.yuan.com/  Enter J454 in the search box to learn more about \"Upper GI Endoscopy: What to Expect at Home. \"  Current as of: April 15, 2020               Content Version: 12.6  © 9202-2200 [employer]. Care instructions adapted under license by [employer] (which disclaims liability or warranty for this information). If you have questions about a medical condition or this instruction, always ask your healthcare professional. [place] [address] any warranty or liability for your use of this information. Patient Education   Gastritis: Care Instructions  Your Care Instructions     Gastritis is a sore and upset stomach. It happens when something irritates the stomach lining. Many things can cause it. These include an infection such as the flu or something you ate or drank. Medicines or a sore on the lining of the stomach (ulcer) also can cause it. Your belly may bloat and ache. You may belch, vomit, and feel sick to your stomach. You should be able to relieve the problem by taking medicine. And it may help to change your diet. If gastritis lasts, your doctor may prescribe medicine. Follow-up care is a key part of your treatment and safety.  Be sure to make and go to all appointments, and call your doctor if you are having problems. It's also a good idea to know your test results and keep a list of the medicines you take. How can you care for yourself at home? · If your doctor prescribed antibiotics, take them as directed. Do not stop taking them just because you feel better. You need to take the full course of antibiotics. · Be safe with medicines. If your doctor prescribed medicine to decrease stomach acid, take it as directed. Call your doctor if you think you are having a problem with your medicine. · Do not take any other medicine, including over-the-counter pain relievers, without talking to your doctor first.  · If your doctor recommends over-the-counter medicine to reduce stomach acid, such as Pepcid AC (famotidine), Prilosec (omeprazole), or Tagamet HB (cimetidine) follow the directions on the label. · Drink plenty of fluids (enough so that your urine is light yellow or clear like water) to prevent dehydration. Choose water and other caffeine-free clear liquids. If you have kidney, heart, or liver disease and have to limit fluids, talk with your doctor before you increase the amount of fluids you drink. · Limit how much alcohol you drink. · Avoid coffee, tea, cola drinks, chocolate, and other foods with caffeine. They increase stomach acid. When should you call for help? Call 911 anytime you think you may need emergency care. For example, call if:    · You vomit blood or what looks like coffee grounds.     · You pass maroon or very bloody stools. Call your doctor now or seek immediate medical care if:    · You start breathing fast and have not produced urine in the last 8 hours.     · You cannot keep fluids down. Watch closely for changes in your health, and be sure to contact your doctor if:    · You do not get better as expected. Where can you learn more?   Go to http://www.gray.com/  Enter Z536 in the search box to learn more about \"Gastritis: Care Instructions. \"  Current as of: April 15, 2020               Content Version: 12.6  © 1347-4869 [employer]. Care instructions adapted under license by [employer] (which disclaims liability or warranty for this information). If you have questions about a medical condition or this instruction, always ask your healthcare professional. [address] any warranty or liability for your use of this information. Patient Education   Peptic Ulcer Disease: Care Instructions  Your Care Instructions     Peptic ulcers are sores on the inside of the stomach or the small intestine (such as a duodenal ulcer). They are most often caused by an infection with bacteria or from use of nonsteroidal anti-inflammatory drugs (NSAIDs). NSAIDs include aspirin, ibuprofen (Advil), and naproxen (Aleve). Your doctor may have prescribed medicine to reduce stomach acid. You also may need to take antibiotics if your peptic ulcers are caused by an infection. You can help yourself heal and keep ulcers from coming back by making some changes in your lifestyle. Quit smoking. Limit alcohol. Follow-up care is a key part of your treatment and safety. Be sure to make and go to all appointments, and call your doctor if you are having problems. It's also a good idea to know your test results and keep a list of the medicines you take. How can you care for yourself at home? · Be safe with medicines. Take your medicines exactly as prescribed. Call your doctor if you think you are having a problem with your medicine. · Do not take aspirin or other NSAIDs such as ibuprofen (Advil or Motrin) or naproxen (Aleve). Ask your doctor what you can take for pain. · Do not smoke. Smoking can make ulcers worse. If you need help quitting, talk to your doctor about stop-smoking programs and medicines. These can increase your chances of quitting for good.   · Drink in moderation or avoid drinking alcohol. · Eat a balanced diet of small, frequent meals. See a dietitian if you need help planning your meals. When should you call for help? Call 911 anytime you think you may need emergency care. For example, call if:    · You vomit blood or what looks like coffee grounds.     · You pass maroon or very bloody stools. Call your doctor now or seek immediate medical care if:    · You have new or worse belly pain.     · Your stools are black and look like tar, or they have streaks of blood.     · You vomit. Watch closely for changes in your health, and be sure to contact your doctor if:    · You do not get better as expected. Where can you learn more? Go to http://www.gray.com/  Enter K074 in the search box to learn more about \"Peptic Ulcer Disease: Care Instructions. \"  Current as of: April 15, 2020               Content Version: 12.6  © 3801-4190 [employer], [employer]. Care instructions adapted under license by [employer] (which disclaims liability or warranty for this information). If you have questions about a medical condition or this instruction, always ask your healthcare professional. [place] [address] any warranty or liability for your use of this information.

## 2020-10-30 NOTE — H&P
[email or web address]  [phone]      GASTROENTEROLOGY Pre-Procedure H and P      Impression/Plan:   1. This patient is consented for an EGD for history of gastric ulcers    Addendum: All lab tests orders pertaining to the procedure have been ordered by the anesthesia personnel and results will be addressed by the anesthesia team  Chief Complaint: history of gastric ulcers      HPI:  Julianne Velasco is a 61 y.o. female who is being is having an EGD for history of gastric ulcers  PMH:   Past Medical History:   Diagnosis Date    Acne     Dr. Yolanda Man Allergic rhinitis     Dr. Malena Lubin    Anemia, iron deficiency 06/2020    Dr Ej De La Torre; has not tried other ssris    Chronic migraine without aura     failed beta blocker, depakote, topamax per pt    Chronic pain     Dr Nevin Dempsey    Degeneration of cervical intervertebral disc     Degeneration of lumbar intervertebral disc     Dr Rooney Corpus mri 2011 showed degen changes L1-L2 and L3-L4, mild to mod bilat foraminal narrowing    Fibromyalgia syndrome     Ganglion cyst     GERD (gastroesophageal reflux disease)     Hyperlipidemia     calculated 10 year risk score 4.6% (9/15); 4.9% (9/16); 7.1% (1/19); 9% (6/20); 11% (9/20)    Hypovitaminosis D     Primary hypertension 4/2/2019    PUD (peptic ulcer disease)     Dr. Alondra Uriarte, duodenal w gi bleed (2011);  Dr Orlin Nicole pyloric ulcer (7/2/20)    Spinal stenosis in cervical region     Tension headache, chronic     uses fioricet    Tobacco dependence syndrome     declined meds; able to quit on her own but not ready due to stressors       PSH:   Past Surgical History:   Procedure Laterality Date    COLONOSCOPY N/A 7/2/2020    Dr Alondra Uriarte 3/28/11 neg; Dr Orlin Nicole 7/2/20 neg   Zaire Dooley ENDOSCOPY      multiple    HX ORTHOPAEDIC      Dr German Villalobos; 7 hand surgeries   Blanquita Nicole RIGHT foot surgery    HX ORTHOPAEDIC  2000s    h/o pelvis fracture       Social HX:   Social History     Socioeconomic History    Marital status:      Spouse name: Not on file    Number of children: 1    Years of education: Not on file    Highest education level: Not on file   Occupational History    Occupation: owns [employer]   Social Needs    Financial resource strain: Not on file    Food insecurity     Worry: Not on file     Inability: Not on file   [employer] needs     Medical: Not on file     Non-medical: Not on file   Tobacco Use    Smoking status: Current Every Day Smoker     Packs/day: 1.00     Years: 37.00     Pack years: 37.00    Smokeless tobacco: Never Used   Substance and Sexual Activity    Alcohol use: No    Drug use: No    Sexual activity: Not on file   Lifestyle    Physical activity     Days per week: Not on file     Minutes per session: Not on file    Stress: Not on file   Relationships    Social connections     Talks on phone: Not on file     Gets together: Not on file     Attends Judaism service: Not on file     Active member of club or organization: Not on file     Attends meetings of clubs or organizations: Not on file     Relationship status: Not on file    Intimate partner violence     Fear of current or ex partner: Not on file     Emotionally abused: Not on file     Physically abused: Not on file     Forced sexual activity: Not on file   Other Topics Concern    Not on file   Social History Narrative    Not on file       FHX:   History reviewed. No pertinent family history. Allergy:   Allergies   Allergen Reactions    Aspirin Other (comments)    Nsaids (Non-Steroidal Anti-Inflammatory Drug) Other (comments)     Pt has had bleeding ulcers       Home Medications:     Medications Prior to Admission   Medication Sig    cetirizine HCl (ZYRTEC PO) Take  by mouth as needed.  LORazepam (Ativan) 1 mg tablet Take  by mouth every four (4) hours as needed for Anxiety.  cholecalciferol (VITAMIN D3) (2,000 UNITS /50 MCG) cap capsule Take  by mouth daily.     atorvastatin (LIPITOR) 10 mg tablet Take 1 Tab by mouth daily.  HYDROcodone-acetaminophen (NORCO)  mg tablet Take 1 Tab by mouth every six (6) hours as needed for Pain. Indications: pain    pantoprazole (PROTONIX) 40 mg tablet Take 1 Tab by mouth daily.  buPROPion XL (Wellbutrin XL) 300 mg XL tablet Take 300 mg by mouth every morning.  amLODIPine (NORVASC) 5 mg tablet TAKE 1 TABLET BY MOUTH EVERY DAY    SUMAtriptan (IMITREX) 100 mg tablet Take 1 Tab by mouth once as needed for Migraine for up to 1 dose. Indications: Migraine    cyclobenzaprine (FLEXERIL) 10 mg tablet TAKE 1 TABLET BY MOUTH THREE TIMES DAILY AS NEEDED FOR MUSCLE SPASM(S)--- 90 day supply  Indications: MUSCLE SPASM    naloxone 4 mg/actuation spry 4 mg by Nasal route as needed for up to 2 doses. Indications: OPIOID TOXICITY    ergocalciferol (ERGOCALCIFEROL) 50,000 unit capsule Take 1 Cap by mouth every thirty (30) days.  loratadine (CLARITIN) 10 mg tablet Take 10 mg by mouth.  albuterol (PROVENTIL, VENTOLIN) 90 mcg/Actuation inhaler Take 2 Puffs by inhalation every six (6) hours as needed. Review of Systems:     Constitutional: No fevers, chills, weight loss, fatigue. Skin: No rashes, pruritis, jaundice, ulcerations, erythema. HENT: No headaches, nosebleeds, sinus pressure, rhinorrhea, sore throat. Eyes: No visual changes, blurred vision, eye pain, photophobia, jaundice. Cardiovascular: No chest pain, heart palpitations. Respiratory: No cough, SOB, wheezing, chest discomfort, orthopnea. Gastrointestinal:    Genitourinary: No dysuria, bleeding, discharge, pyuria. Musculoskeletal: No weakness, arthralgias, wasting. Endo: No sweats. Heme: No bruising, easy bleeding. Allergies: As noted. Neurological: Cranial nerves intact. Alert and oriented. Gait not assessed. Psychiatric:  No anxiety, depression, hallucinations.           Visit Vitals  Ht 5' 4\" (1.626 m)   Wt 61.7 kg (136 lb)   BMI 23.34 kg/m²       Physical Assessment:     constitutional: appearance: well developed, well nourished, normal habitus, no deformities, in no acute distress. skin: inspection: no rashes, ulcers, icterus or other lesions; no clubbing or telangiectasias. palpation: no induration or subcutaneos nodules. eyes: inspection: normal conjunctivae and lids; no jaundice pupils: normal  ENMT: mouth: normal oral mucosa,lips and gums; good dentition. oropharynx: normal tongue, hard and soft palate; posterior pharynx without erithema, exudate or lesions. neck: thyroid: normal size, consistency and position; no masses or tenderness. respiratory: effort: normal chest excursion; no intercostal retraction or accessory muscle use. cardiovascular: abdominal aorta: normal size and position; no bruits. palpation: PMI of normal size and position; normal rhythm; no thrill or murmurs. abdominal: abdomen: normal consistency; no tenderness or masses. hernias: no hernias appreciated. liver: normal size and consistency. spleen: not palpable. rectal: hemoccult/guaiac: not performed. musculoskeletal: digits and nails: no clubbing, cyanosis, petechiae or other inflammatory conditions. gait: normal gait and station head and neck: normal range of motion; no pain, crepitation or contracture. spine/ribs/pelvis: normal range of motion; no pain, deformity or contracture. neurologic: cranial nerves: II-XII normal.   psychiatric: judgement/insight: within normal limits. memory: within normal limits for recent and remote events. mood and affect: no evidence of depression, anxiety or agitation. orientation: oriented to time, space and person. Basic Metabolic Profile   No results for input(s): NA, K, CL, CO2, BUN, GLU, CA, MG, PHOS in the last 72 hours. No lab exists for component: CREAT      CBC w/Diff    No results for input(s): WBC, RBC, HGB, HCT, MCV, MCH, MCHC, RDW, PLT, HGBEXT, HCTEXT, PLTEXT in the last 72 hours.     No lab exists for component: MPV No results for input(s): GRANS, LYMPH, EOS, PRO, MYELO, METAS, BLAST in the last 72 hours. No lab exists for component: MONO, BASO     Hepatic Function   No results for input(s): ALB, TP, TBILI, AP, AML, LPSE in the last 72 hours. No lab exists for component: DBILI, GPT, SGOT     Coags   No results for input(s): PTP, INR, APTT, INREXT in the last 72 hours. Lissy Portillo MD  [facility] [address]  Cell: [phone]  Direct pager: [phone]  [email or web address]  [email or web address]

## 2020-10-30 NOTE — PROGRESS NOTES
[email or web address] [address]  [place], [address]      Brief Procedure Note    Annalee Marie  1960  [id number]    Date of Procedure: 10/30/2020    Preoperative diagnosis: Anemia    Postoperative diagnosis: gastric bx's r/o h pylori, mild gastritis, mild duodenal deformity, healed ulcer    Type of Anesthesia: MAC (Monitored anesthesia care)    Description of findings: same as post op dx    Procedure: Procedure(s):  UPPER ENDOSCOPY with bx's    :  Dr. Lauren Singh MD    Assistant(s): Endoscopy RN-1: Caroline Barajas RN; Esequiel Guevara Staff: Renate Gonzalez RN    Devices/implants/grafts/tissues/prosthesis: None    EBL:None    Specimens:   ID Type Source Tests Collected by Time Destination   1 : gastric bx's  Preservative Gastric  Andreea Lawler MD 10/30/2020 8893 Pathology       Findings: See printed and scanned procedure note    Complications: None    Dr. Lauren Singh MD  10/30/2020  8:47 AM

## 2020-11-12 ENCOUNTER — TRANSCRIBE ORDER (OUTPATIENT)
Dept: SCHEDULING | Age: 60
End: 2020-11-12

## 2020-11-12 DIAGNOSIS — Z12.31 VISIT FOR SCREENING MAMMOGRAM: Primary | ICD-10-CM

## 2021-01-07 ENCOUNTER — APPOINTMENT (OUTPATIENT)
Dept: INTERNAL MEDICINE CLINIC | Age: 61
End: 2021-01-07

## 2021-01-07 ENCOUNTER — HOSPITAL ENCOUNTER (OUTPATIENT)
Dept: LAB | Age: 61
Discharge: HOME | End: 2021-01-07
Payer: COMMERCIAL

## 2021-01-07 DIAGNOSIS — E78.5 HYPERLIPIDEMIA, UNSPECIFIED HYPERLIPIDEMIA TYPE: ICD-10-CM

## 2021-01-07 DIAGNOSIS — D50.0 IRON DEFICIENCY ANEMIA DUE TO CHRONIC BLOOD LOSS: ICD-10-CM

## 2021-01-07 LAB
ALBUMIN SERPL-MCNC: 3.7 G/DL (ref 3.4–5)
ALBUMIN/GLOB SERPL: 1.1 {RATIO} (ref 0.8–1.7)
ALP SERPL-CCNC: 94 U/L (ref 45–117)
ALT SERPL-CCNC: 16 U/L (ref 13–56)
ANION GAP SERPL CALC-SCNC: 4 MMOL/L (ref 3–18)
AST SERPL-CCNC: 14 U/L (ref 10–38)
BILIRUB SERPL-MCNC: 0.3 MG/DL (ref 0.2–1)
BUN SERPL-MCNC: 13 MG/DL (ref 7–18)
BUN/CREAT SERPL: 17 (ref 12–20)
CALCIUM SERPL-MCNC: 9 MG/DL (ref 8.5–10.1)
CHLORIDE SERPL-SCNC: 106 MMOL/L (ref 100–111)
CHOLEST SERPL-MCNC: 247 MG/DL
CO2 SERPL-SCNC: 31 MMOL/L (ref 21–32)
CREAT SERPL-MCNC: 0.75 MG/DL (ref 0.6–1.3)
ERYTHROCYTE [DISTWIDTH] IN BLOOD BY AUTOMATED COUNT: 12.3 % (ref 11.6–14.5)
GLOBULIN SER CALC-MCNC: 3.4 G/DL (ref 2–4)
GLUCOSE SERPL-MCNC: 85 MG/DL (ref 74–99)
HCT VFR BLD AUTO: 37.8 % (ref 35–45)
HDLC SERPL-MCNC: 94 MG/DL (ref 40–60)
HDLC SERPL: 2.6 {RATIO} (ref 0–5)
HGB BLD-MCNC: 11.9 G/DL (ref 12–16)
IRON SATN MFR SERPL: 12 % (ref 20–50)
IRON SERPL-MCNC: 42 UG/DL (ref 50–175)
LDLC SERPL CALC-MCNC: 127.8 MG/DL (ref 0–100)
LIPID PROFILE,FLP: ABNORMAL
MCH RBC QN AUTO: 31.5 PG (ref 24–34)
MCHC RBC AUTO-ENTMCNC: 31.5 G/DL (ref 31–37)
MCV RBC AUTO: 100 FL (ref 74–97)
PLATELET # BLD AUTO: 331 K/UL (ref 135–420)
PMV BLD AUTO: 9.7 FL (ref 9.2–11.8)
POTASSIUM SERPL-SCNC: 4.1 MMOL/L (ref 3.5–5.5)
PROT SERPL-MCNC: 7.1 G/DL (ref 6.4–8.2)
RBC # BLD AUTO: 3.78 M/UL (ref 4.2–5.3)
SODIUM SERPL-SCNC: 141 MMOL/L (ref 136–145)
TIBC SERPL-MCNC: 361 UG/DL (ref 250–450)
TRIGL SERPL-MCNC: 126 MG/DL (ref ?–150)
VLDLC SERPL CALC-MCNC: 25.2 MG/DL
WBC # BLD AUTO: 4.5 K/UL (ref 4.6–13.2)

## 2021-01-07 PROCEDURE — 80053 COMPREHEN METABOLIC PANEL: CPT

## 2021-01-07 PROCEDURE — 80061 LIPID PANEL: CPT

## 2021-01-07 PROCEDURE — 85027 COMPLETE CBC AUTOMATED: CPT

## 2021-01-07 PROCEDURE — 83540 ASSAY OF IRON: CPT

## 2021-01-07 NOTE — PROGRESS NOTES
61 y.o. WHITE OR  female who presents for evaluation    She continues to go to Dr Brittany Juarez for pain mgmt. She had f/u eval with Dr Carlos Mandujano in fall, remains on ppi and now taking fe supp also but minimal as she has severe diarrhea at the lowest doses    We started her on statin at the last visit and no sfx to report    No  or gyn complaints. Her bp has been controlled and no cardiovascular complaints     Not able to stop smoking, declined meds previously. No wheezing or sob    She is under a lot of stress with her mom's general debilitation, her dad's progressive dementia, and now the adopted 13year-old is acting out with suicidal tendencies. Past Medical History:   Diagnosis Date    Acne     Dr. Ghislaine Whyte Allergic rhinitis     Dr. Marcial German    Anemia, iron deficiency 06/2020    Dr Carlos Mandujano    Anxiety     intol paxil; has not tried other ssris    Chronic migraine without aura     failed beta blocker, depakote, topamax per pt    Chronic pain     Dr [facility]    Degeneration of cervical intervertebral disc     Degeneration of lumbar intervertebral disc     Dr Christianne Turcios 2011 showed degen changes L1-L2 and L3-L4, mild to mod bilat foraminal narrowing    Fibromyalgia syndrome     Ganglion cyst     GERD (gastroesophageal reflux disease)     Hyperlipidemia     calculated 10 year risk score 4.6% (9/15); 4.9% (9/16); 7.1% (1/19); 9% (6/20); 11% (9/20)    Hypovitaminosis D     Primary hypertension 4/2/2019    PUD (peptic ulcer disease)     Dr. Cora Dougherty, duodenal w gi bleed (2011);  Dr Carlos Mandujano pyloric ulcer (7/2/20)    Spinal stenosis in cervical region     Tension headache, chronic     uses fioricet    Tobacco dependence syndrome     declined meds; able to quit on her own but not ready due to stressors     Past Surgical History:   Procedure Laterality Date    COLONOSCOPY N/A 7/2/2020    Dr Cora Dougherty 3/28/11 neg; Dr Carlos Mandujano 7/2/20 neg    HX ENDOSCOPY      Dr Carlos Mandujano 10/30/20 hp neg    HX ORTHOPAEDIC Dr Kathlee Dance; 7 hand surgeries   [facility] foot surgery    HX ORTHOPAEDIC  2000s    h/o pelvis fracture     Social History     Socioeconomic History    Marital status:      Spouse name: Not on file    Number of children: 1    Years of education: Not on file    Highest education level: Not on file   Occupational History    Occupation: owns [employer]   Social Needs    Financial resource strain: Not on file    Food insecurity     Worry: Not on file     Inability: Not on file   [employer] needs     Medical: Not on file     Non-medical: Not on file   Tobacco Use    Smoking status: Current Every Day Smoker     Packs/day: 1.00     Years: 37.00     Pack years: 37.00     Types: Cigarettes    Smokeless tobacco: Never Used   Substance and Sexual Activity    Alcohol use: No    Drug use: No    Sexual activity: Not on file   Lifestyle    Physical activity     Days per week: Not on file     Minutes per session: Not on file    Stress: Not on file   Relationships    Social connections     Talks on phone: Not on file     Gets together: Not on file     Attends Latter day service: Not on file     Active member of club or organization: Not on file     Attends meetings of clubs or organizations: Not on file     Relationship status: Not on file    Intimate partner violence     Fear of current or ex partner: Not on file     Emotionally abused: Not on file     Physically abused: Not on file     Forced sexual activity: Not on file   Other Topics Concern    Not on file   Social History Narrative    Not on file     Current Outpatient Medications   Medication Sig    cetirizine HCl (ZYRTEC PO) Take  by mouth as needed.  LORazepam (Ativan) 1 mg tablet Take  by mouth every four (4) hours as needed for Anxiety.  cholecalciferol (VITAMIN D3) (2,000 UNITS /50 MCG) cap capsule Take  by mouth daily.     HYDROcodone-acetaminophen (NORCO)  mg tablet Take 1 Tab by mouth every six (6) hours as needed for Pain. Indications: pain    pantoprazole (PROTONIX) 40 mg tablet Take 1 Tab by mouth daily.  buPROPion XL (Wellbutrin XL) 300 mg XL tablet Take 300 mg by mouth every morning.  amLODIPine (NORVASC) 5 mg tablet TAKE 1 TABLET BY MOUTH EVERY DAY    SUMAtriptan (IMITREX) 100 mg tablet Take 1 Tab by mouth once as needed for Migraine for up to 1 dose. Indications: Migraine    cyclobenzaprine (FLEXERIL) 10 mg tablet TAKE 1 TABLET BY MOUTH THREE TIMES DAILY AS NEEDED FOR MUSCLE SPASM(S)--- 90 day supply  Indications: MUSCLE SPASM    naloxone 4 mg/actuation spry 4 mg by Nasal route as needed for up to 2 doses. Indications: OPIOID TOXICITY     No current facility-administered medications for this visit. Allergies   Allergen Reactions    Aspirin Other (comments)    Nsaids (Non-Steroidal Anti-Inflammatory Drug) Other (comments)     Pt has had bleeding ulcers     REVIEW OF SYSTEMS: gyn Dr Tania Goldberg 2016?, mammo 2/19, colo 2012 Dr Pallavi Shirley no vision change or eye pain  Oral  no mouth pain, tongue or tooth problems  Ears  no hearing loss, ear pain, fullness, no swallowing problems  Cardiac  no CP, PND, orthopnea, edema, palpitations or syncope  Chest  no breast masses  Resp  no wheezing, chronic coughing, dyspnea  Urinary  no dysuria, hematuria, flank pain, urgency, frequency    Visit Vitals  BP (!) 143/80   Pulse 91   Temp 96.8 °F (36 °C) (Temporal)   Resp 14   Ht 5' 4\" (1.626 m)   Wt 145 lb (65.8 kg)   SpO2 99%   BMI 24.89 kg/m²     A&O x3  Affect is appropriate. Mood stable  No apparent distress  Anicteric, no JVD, adenopathy or thyromegaly. Mouth ulcers evident bilaterally  No carotid bruits or radiated murmur  Lungs clear to auscultation, no wheezes or rales  Heart showed regular rate and rhythm. No murmur, rubs, gallops  Abdomen soft nontender, no hepatosplenomegaly or masses. Extremities without edema.   Pulses 1-2+ symmetrically    LABS   From 2/10 showed gluc 88, cr 0.80,             alt 26, chol 255, tg 98,    hdl 68, ldl-c 168, wbc 4.5, hb 13.4, plt 233, tsh 0.92, ua neg  From 1/11 showed gluc 94, cr 0.60, gfr>60,            chol 137, tg 123, hdl 29, ldl-c 83,                       ck/trop neg  From 6/13 showed gluc 95, cr 0.70, gfr>60, alt 17, chol 232, tg 170, hdl 62, ldl-c 136,        ua neg, vit d 23.4  From 9/15 showed gluc 91, cr 0.96, gfr 60,  alt<5,  chol 255, tg 103, hdl 76, ldl-c 158, wbc 6.8, hb 13.2, plt 271,     ua neg, vit d 23.0  From 9/16 showed gluc 87, cr 0.73, gfr 92,  alt 13, chol 254, tg 122, hdl 84, ldl-c 146, wbc 5.6, hb 13.0, plt 270, tsh 1.76, ua neg,          hep c-  From 9/17 showed gluc 86, cr 0.76, gfr>60, alt 15, chol 263, tg 145, hdl 92, ldl-c 142, wbc 5.2, hb 12.2, plt 277,     ua neg, vit d 99.4  From 1/19 showed glu 101, cr 0.88, gfr>60, alt 12, chol 283, tg 183, hdl 76, ldl-c 162,          vit d 25.7  From 6/20 showed gluc 92, cr 0.98, gfr 58,  alt 17, chol 248, tg 135, hdl 79, ldl-c 142, wbc 4.7, hb 6.5,   plt 338, tsh 1.72,     fe 8,         %sat 2, ferritin 3, b12 263, fol 13.9, spep neg, iris neg  From 7/20 showed               wbc 4.5, hb 8.0,   plt 336,       fe 15,       %sat 3, ferritin 4  From 9/20 showed               chol 275, tg 113, hdl 82, ldl-c 170, wbc 4.7, hb 11.4, plt 238,       fe 91,       %sat 29, hba1c 4.5,     Results for orders placed or performed during the hospital encounter of 80/71/60   METABOLIC PANEL, COMPREHENSIVE   Result Value Ref Range    Sodium 141 136 - 145 mmol/L    Potassium 4.1 3.5 - 5.5 mmol/L    Chloride 106 100 - 111 mmol/L    CO2 31 21 - 32 mmol/L    Anion gap 4 3.0 - 18 mmol/L    Glucose 85 74 - 99 mg/dL    BUN 13 7.0 - 18 MG/DL    Creatinine 0.75 0.6 - 1.3 MG/DL    BUN/Creatinine ratio 17 12 - 20      GFR est AA >60 >60 ml/min/1.73m2    GFR est non-AA >60 >60 ml/min/1.73m2    Calcium 9.0 8.5 - 10.1 MG/DL    Bilirubin, total 0.3 0.2 - 1.0 MG/DL    ALT (SGPT) 16 13 - 56 U/L    AST (SGOT) 14 10 - 38 U/L    Alk. phosphatase 94 45 - 117 U/L    Protein, total 7.1 6.4 - 8.2 g/dL    Albumin 3.7 3.4 - 5.0 g/dL    Globulin 3.4 2.0 - 4.0 g/dL    A-G Ratio 1.1 0.8 - 1.7     CBC W/O DIFF   Result Value Ref Range    WBC 4.5 (L) 4.6 - 13.2 K/uL    RBC 3.78 (L) 4.20 - 5.30 M/uL    HGB 11.9 (L) 12.0 - 16.0 g/dL    HCT 37.8 35.0 - 45.0 %    .0 (H) 74.0 - 97.0 FL    MCH 31.5 24.0 - 34.0 PG    MCHC 31.5 31.0 - 37.0 g/dL    RDW 12.3 11.6 - 14.5 %    PLATELET 130 690 - 435 K/uL    MPV 9.7 9.2 - 11.8 FL   LIPID PANEL   Result Value Ref Range    LIPID PROFILE          Cholesterol, total 247 (H) <200 MG/DL    Triglyceride 126 <150 MG/DL    HDL Cholesterol 94 (H) 40 - 60 MG/DL    LDL, calculated 127.8 (H) 0 - 100 MG/DL    VLDL, calculated 25.2 MG/DL    CHOL/HDL Ratio 2.6 0 - 5.0     IRON PROFILE   Result Value Ref Range    Iron 42 (L) 50 - 175 ug/dL    TIBC 361 250 - 450 ug/dL    Iron % saturation 12 (L) 20 - 50 %     We reviewed the patient's labs from the last several visits to point out trends in the numbers          Patient Active Problem List   Diagnosis Code    Hyperlipidemia E78.5    Hypovitaminosis D E55.9    Chronic pain syndrome G89.4    Degeneration of lumbar or lumbosacral intervertebral disc M51.37    Polyarthralgia M25.50    Generalized OA M15.9    Tobacco dependence syndrome F17.200    Peptic ulcer disease K27.9    Anxiety F41.9    Primary hypertension I10     Assessment and plan:  1. Allergic rhinitis. Prn meds  2. Chronic pain syndrome. F/U Dr Emily Ponce   3. Anxiety. Continue current  4. Hyperlipidemia. Inc lipitor to 20  5. H/O PUD and GERD. PPI and avoidance measures indefinitely  6. General.  F/U gyn, mammo   7. Smoking cessation meds declined. Long discussion about lung ca screening; she is willing to get done assuming covered by insurance  8. Hypovit d. Supplementation  9. Anemia.   Improving,  However, with the iron dropping and being intol of oral preps, will refer to hematology for possible infusion assuming covered by insurance        RTC 7/21    Above conditions discussed at length and patient vocalized understanding.   All questions answered to patient satisfaction

## 2021-01-14 ENCOUNTER — OFFICE VISIT (OUTPATIENT)
Dept: INTERNAL MEDICINE CLINIC | Age: 61
End: 2021-01-14
Payer: COMMERCIAL

## 2021-01-14 VITALS
WEIGHT: 145 LBS | OXYGEN SATURATION: 99 % | HEIGHT: 64 IN | HEART RATE: 91 BPM | DIASTOLIC BLOOD PRESSURE: 80 MMHG | BODY MASS INDEX: 24.75 KG/M2 | SYSTOLIC BLOOD PRESSURE: 143 MMHG | RESPIRATION RATE: 14 BRPM | TEMPERATURE: 96.8 F

## 2021-01-14 DIAGNOSIS — K27.9 PEPTIC ULCER DISEASE: ICD-10-CM

## 2021-01-14 DIAGNOSIS — F17.200 TOBACCO DEPENDENCE SYNDROME: ICD-10-CM

## 2021-01-14 DIAGNOSIS — E78.5 HYPERLIPIDEMIA, UNSPECIFIED HYPERLIPIDEMIA TYPE: ICD-10-CM

## 2021-01-14 DIAGNOSIS — G89.4 CHRONIC PAIN SYNDROME: ICD-10-CM

## 2021-01-14 DIAGNOSIS — I10 PRIMARY HYPERTENSION: ICD-10-CM

## 2021-01-14 DIAGNOSIS — E61.1 IRON DEFICIENCY: Primary | ICD-10-CM

## 2021-01-14 DIAGNOSIS — F41.9 ANXIETY: ICD-10-CM

## 2021-01-14 PROCEDURE — 99214 OFFICE O/P EST MOD 30 MIN: CPT | Performed by: INTERNAL MEDICINE

## 2021-01-14 RX ORDER — ATORVASTATIN CALCIUM 20 MG/1
20 TABLET, FILM COATED ORAL DAILY
Qty: 90 TAB | Refills: 3 | Status: SHIPPED | OUTPATIENT
Start: 2021-01-14 | End: 2021-01-14

## 2021-01-14 NOTE — PROGRESS NOTES
Carlitos Cherykristal presents today for   Chief Complaint   Patient presents with    Hypertension     4 month f/u with labs              Depression Screening:  3 most recent PHQ Screens 1/14/2021   PHQ Not Done -   Little interest or pleasure in doing things Not at all   Feeling down, depressed, irritable, or hopeless Not at all   Total Score PHQ 2 0       Learning Assessment:  Learning Assessment 9/8/2017   PRIMARY LEARNER Patient   BARRIERS PRIMARY LEARNER [address] CAREGIVER No   PRIMARY LANGUAGE ENGLISH   LEARNER PREFERENCE PRIMARY LISTENING   ANSWERED BY patient   RELATIONSHIP SELF       Abuse Screening:  Abuse Screening Questionnaire 7/28/2017   Do you ever feel afraid of your partner? N   Are you in a relationship with someone who physically or mentally threatens you? N   Is it safe for you to go home? Y       Fall Risk  Fall Risk Assessment, last 12 mths 1/30/2014   Able to walk? Yes   Fall in past 12 months? Yes   Number of falls in past 12 months 1   Fall with injury? 0           Coordination of Care:  1. Have you been to the ER, urgent care clinic since your last visit? Hospitalized since your last visit? no    2. Have you seen or consulted any other health care providers outside of the [address] [place] [address] since your last visit? Include any pap smears or colon screening.  no

## 2021-01-21 ENCOUNTER — HOSPITAL ENCOUNTER (OUTPATIENT)
Dept: MAMMOGRAPHY | Age: 61
Discharge: HOME | End: 2021-01-21
Attending: INTERNAL MEDICINE
Payer: COMMERCIAL

## 2021-01-21 DIAGNOSIS — Z12.31 VISIT FOR SCREENING MAMMOGRAM: ICD-10-CM

## 2021-01-21 PROCEDURE — 77063 BREAST TOMOSYNTHESIS BI: CPT

## 2021-01-26 ENCOUNTER — TELEPHONE (OUTPATIENT)
Dept: INTERNAL MEDICINE CLINIC | Age: 61
End: 2021-01-26

## 2021-01-26 DIAGNOSIS — N63.0 BREAST MASS: Primary | ICD-10-CM

## 2021-01-26 NOTE — TELEPHONE ENCOUNTER
Patient is requesting a referral to Dr. Johny Mendez to have a breast mass removed from her left breast.

## 2021-01-27 ENCOUNTER — NURSE NAVIGATOR (OUTPATIENT)
Dept: OTHER | Age: 61
End: 2021-01-27

## 2021-01-27 NOTE — NURSE NAVIGATOR
Referring Provider: Clifton Cartwright MD      Lung Cancer Risk Profile:   Age: 61  Gender: Female  Height: 64\"  Weight: 145#    Smoking History:  Smoking Status: current use  # years smokin  # years quit: 0  Packs/day: 1  Pack years: 55    Patient discussed smoking cessation with PCP: Yes, per patient report    Patient participated in shared decision making process with PCP: Unknown    Patient is currently experiencing symptoms: No, per patient report    If yes what symptoms:     Co-Morbidities:      Cancer History:      Additional Risk Factors:  Exposure to second hand smoke      Patient's smoking history discussed via phone. Patient meets LDCT lung cancer screening criteria. Appointment scheduled for 2021 verified with ANTHONY BraunN, RN, OCN  Lung Health Nurse Navigator

## 2021-01-28 DIAGNOSIS — F17.200 TOBACCO DEPENDENCE SYNDROME: ICD-10-CM

## 2021-02-02 ENCOUNTER — HOSPITAL ENCOUNTER (OUTPATIENT)
Dept: CT IMAGING | Age: 61
Discharge: HOME | End: 2021-02-02
Attending: INTERNAL MEDICINE
Payer: COMMERCIAL

## 2021-02-02 PROCEDURE — 71271 CT THORAX LUNG CANCER SCR C-: CPT

## 2021-02-05 NOTE — H&P (VIEW-ONLY)
Breast Mass Consult Ms. Blanquita Fernandez is a 61year old woman who was referred for a left breast mass. It is located in the upper inner quadrant. She initially noticed the mass 2-3 years ago. It has increased in size. It is bothersome. She deneis history of similar symptoms previously. She denies nipple discharge. Imaging has been performed and is consistent with sebaceous cyst. There is no family history of breast cancer. Her mother had fibrocystic breast changes. She had planned excision last year however deferred due to ongoing medical problems including bleeding, anemia, and need for transfusions. Breast/GYN history:   
Past Medical History:  
Diagnosis Date  Acne Dr. Doris Harris  Allergic rhinitis Dr. Karen Wu  Anemia, iron deficiency 06/2020 Dr Juan Villaseñor  Anxiety   
 intol paxil; has not tried other ssris  Chronic migraine without aura   
 failed beta blocker, depakote, topamax per pt  Chronic pain Dr Joel Mejia  Degeneration of cervical intervertebral disc  Degeneration of lumbar intervertebral disc Dr Elizabeth Rocha 2011 showed degen changes L1-L2 and L3-L4, mild to mod bilat foraminal narrowing  Fibromyalgia syndrome  Ganglion cyst   
 GERD (gastroesophageal reflux disease)  Hyperlipidemia   
 calculated 10 year risk score 4.6% (9/15); 4.9% (9/16); 7.1% (1/19); 9% (6/20); 11% (9/20)  Hypovitaminosis D   
 Primary hypertension 4/2/2019  PUD (peptic ulcer disease) Dr. Sami Harden, duodenal w gi bleed (2011); Dr Juan Villaseñor pyloric ulcer (7/2/20)  Spinal stenosis in cervical region  Tension headache, chronic   
 uses fioricet  Tobacco dependence syndrome   
 declined meds; able to quit on her own but not ready due to stressors Past Surgical History:  
Procedure Laterality Date  COLONOSCOPY N/A 7/2/2020 Dr Sami Harden 3/28/11 neg; Dr Juan Villaseñor 7/2/20 neg  HX ENDOSCOPY Dr Juan Villaseñor 10/30/20 hp neg  HX ORTHOPAEDIC    
 Dr Carlton Sanchez; 7 hand surgeries [facility] Dr. Nik Meraz foot surgery  HX ORTHOPAEDIC  2000s h/o pelvis fracture Current Outpatient Medications on File Prior to Visit Medication Sig Dispense Refill  cetirizine HCl (ZYRTEC PO) Take  by mouth as needed.  LORazepam (Ativan) 1 mg tablet Take  by mouth every four (4) hours as needed for Anxiety.  cholecalciferol (VITAMIN D3) (2,000 UNITS /50 MCG) cap capsule Take  by mouth daily.  HYDROcodone-acetaminophen (NORCO)  mg tablet Take 1 Tab by mouth every six (6) hours as needed for Pain. Indications: pain  pantoprazole (PROTONIX) 40 mg tablet Take 1 Tab by mouth daily. 180 Tab 3  
 buPROPion XL (Wellbutrin XL) 300 mg XL tablet Take 300 mg by mouth every morning.  amLODIPine (NORVASC) 5 mg tablet TAKE 1 TABLET BY MOUTH EVERY DAY 90 Tab 3  
 SUMAtriptan (IMITREX) 100 mg tablet Take 1 Tab by mouth once as needed for Migraine for up to 1 dose. Indications: Migraine 9 Tab 0  cyclobenzaprine (FLEXERIL) 10 mg tablet TAKE 1 TABLET BY MOUTH THREE TIMES DAILY AS NEEDED FOR MUSCLE SPASM(S)--- 90 day supply  Indications: MUSCLE SPASM 270 Tab 3  
 naloxone 4 mg/actuation spry 4 mg by Nasal route as needed for up to 2 doses. Indications: OPIOID TOXICITY 1 Box 1 No current facility-administered medications on file prior to visit. Allergies Allergen Reactions  Aspirin Other (comments)  Nsaids (Non-Steroidal Anti-Inflammatory Drug) Other (comments) Pt has had bleeding ulcers Social History Tobacco Use  Smoking status: Current Every Day Smoker Packs/day: 1.00 Years: 37.00 Pack years: 37.00 Types: Cigarettes  Smokeless tobacco: Never Used Substance Use Topics  Alcohol use: No  
 Drug use: No  
 
 
No family history on file. ROS:  
Positives are bolded General: fevers, chills, night sweats, fatigue, weight loss, weight gain GI: nausea, vomiting, abdominal pain, change in bowel habits, hematochezia, melena, GERD Integ: dermatitis, abnormal moles HEENT: visual changes, vertigo, epistaxis, dysphagia, hoarseness Cardiac: chest pain, palpitations, HTN, edema, varicosities Resp: cough, shortness of breath, wheezing, hemoptysis, snoring, reactive airway disease : hematuria, dysuria, frequency, urgency, nocturia, stress urinary incontinence MSK: weakness, joint pain, arthritis Endocrine: diabetes, thyroid disease, polyuria, polydipsia, polyphagia, poor wound healing, heat intolerance, cold intolerance Lymph/Heme: anemia, bruising, history of blood transfusions Neuro: dizziness, headache, fainting, seizures, stroke Psych: anxiety, depression Physical Exam: 
Visit Vitals BP (!) 156/81 Pulse 84 Temp 98.1 °F (36.7 °C) (Skin) Resp 18 Ht 5' 4\" (1.626 m) Wt 64.4 kg (142 lb) BMI 24.37 kg/m² Gen: No distress Head: Normocephalic, atraumatic Mouth: Clear, no overt lesions, oral mucosa pink and moist 
Neck: Supple, no masses, no adenopathy, trachea midline Resp: Clear bilaterally Cardio: Regular rate and rhythm Abdomen: soft, nontender, nondistended Extremeties: warm, well-perfused Neuro: sensation and strength grossly intact and symmetrical 
Psych: alert and oriented to person, place and time Breasts: 
Right: Examined in both the supine and upright positions. There was no supraclavicular, infraclavicular, or axillary lymphadenopathy. There were no dominant masses, no skin changes, no asymmetry identified Left:  Examined in both the supine and upright positions. There was no supraclavicular, infraclavicular, or axillary lymphadenopathy. There were no dominant masses, no skin changes, no asymmetry identified smooth nodule upper inner with head Imagin21 bilateral mammogram ([facility]) No suspicious finding for malignancy. 
  
  
 Recommend clinical followup and annual mammography as per the Society of Breast 
Imaging and the [address] [place] [address] of Radiology guidelines.  
  
BIRADS 1: Negative 
  
2/1/19 bilateral mammogram/left ultrasound ([facility]) 1. Benign findings. Likely sebaceous cyst in the left breast. This is a benign 
finding. However, due to risk of infection, excision is recommended. Resume 
annual screening mammography 
  
BI-RADS Category 2 - Benign  
  These findings were discussed with the patient in person. Indications to return 
sooner were discussed with the patient such as new palpable nodule, nipple 
inversion, spontaneous nipple discharge especially if bloody, and architectural 
changes. Patient demonstrated understanding.  
  
The lack of mammographic evidence of malignancy should not deter further work-up 
of a clinically significant palpable finding. Radiodense breast tissue may 
obscure an early malignancy or a palpable finding. 10-15% of breast cancers are 
not detected by mammography. 
  
  
 
 
Impression: 
Patient Active Problem List  
Diagnosis Code  Hyperlipidemia E78.5  Hypovitaminosis D E55.9  Chronic pain syndrome G89.4  Degeneration of lumbar or lumbosacral intervertebral disc M51.37  
 Polyarthralgia M25.50  Generalized OA M15.9  Tobacco dependence syndrome F17.200  Peptic ulcer disease K27.9  Anxiety F41.9  Primary hypertension I10  Left breast mass N63.20 61year old woman with left breast mass. We discussed that based on imaging and exam, it is most likely a sebaceous cyst.  We discussed these are benign, not cancer and do not turn into cancer. They can increase in size, cause pain, and/or become infected. As hers has increased in size, she is interested in excision. Risks, benefits and options were discussed in detail to include, but not limited to, bleeding, infection, risks of anesthesia, injury to surrounding structures and other unforeseen events such as stroke, heart attack or death. She is interested in proceeding with left breast excisional biopsy. She was asked to call with questions or concerns.

## 2021-02-05 NOTE — PROGRESS NOTES
Breast Mass Consult      Ms. Nathalie Singh is a 60 year old woman who was referred for a left breast mass. It is located in the upper inner quadrant. She initially noticed the mass 2-3 years ago. It has increased in size. It is bothersome. She deneis history of similar symptoms previously. She denies nipple discharge. Imaging has been performed and is consistent with sebaceous cyst. There is no family history of breast cancer. Her mother had fibrocystic breast changes.  She had planned excision last year however deferred due to ongoing medical problems including bleeding, anemia, and need for transfusions.        Breast/GYN history:    Past Medical History:   Diagnosis Date   • Acne     Dr. Vargas   • Allergic rhinitis     Dr. Delacruz   • Anemia, iron deficiency 06/2020    Dr Brown   • Anxiety     intol paxil; has not tried other ssris   • Chronic migraine without aura     failed beta blocker, depakote, topamax per pt   • Chronic pain     Dr Kaur   • Degeneration of cervical intervertebral disc    • Degeneration of lumbar intervertebral disc     Dr Kaur mri 2011 showed degen changes L1-L2 and L3-L4, mild to mod bilat foraminal narrowing   • Fibromyalgia syndrome    • Ganglion cyst    • GERD (gastroesophageal reflux disease)    • Hyperlipidemia     calculated 10 year risk score 4.6% (9/15); 4.9% (9/16); 7.1% (1/19); 9% (6/20); 11% (9/20)   • Hypovitaminosis D    • Primary hypertension 4/2/2019   • PUD (peptic ulcer disease)     Dr. Vargas, duodenal w gi bleed (2011); Dr Brown pyloric ulcer (7/2/20)   • Spinal stenosis in cervical region    • Tension headache, chronic     uses fioricet   • Tobacco dependence syndrome     declined meds; able to quit on her own but not ready due to stressors       Past Surgical History:   Procedure Laterality Date   • COLONOSCOPY N/A 7/2/2020    Dr Vargas 3/28/11 neg; Dr Brown 7/2/20 neg   • HX ENDOSCOPY      Dr Brown 10/30/20 hp neg   • HX ORTHOPAEDIC      Dr Major; 7 hand  surgeries    Cecilia Encarnacion foot surgery    HX ORTHOPAEDIC  2000s    h/o pelvis fracture       Current Outpatient Medications on File Prior to Visit   Medication Sig Dispense Refill    cetirizine HCl (ZYRTEC PO) Take  by mouth as needed.  LORazepam (Ativan) 1 mg tablet Take  by mouth every four (4) hours as needed for Anxiety.  cholecalciferol (VITAMIN D3) (2,000 UNITS /50 MCG) cap capsule Take  by mouth daily.  HYDROcodone-acetaminophen (NORCO)  mg tablet Take 1 Tab by mouth every six (6) hours as needed for Pain. Indications: pain      pantoprazole (PROTONIX) 40 mg tablet Take 1 Tab by mouth daily. 180 Tab 3    buPROPion XL (Wellbutrin XL) 300 mg XL tablet Take 300 mg by mouth every morning.  amLODIPine (NORVASC) 5 mg tablet TAKE 1 TABLET BY MOUTH EVERY DAY 90 Tab 3    SUMAtriptan (IMITREX) 100 mg tablet Take 1 Tab by mouth once as needed for Migraine for up to 1 dose. Indications: Migraine 9 Tab 0    cyclobenzaprine (FLEXERIL) 10 mg tablet TAKE 1 TABLET BY MOUTH THREE TIMES DAILY AS NEEDED FOR MUSCLE SPASM(S)--- 90 day supply  Indications: MUSCLE SPASM 270 Tab 3    naloxone 4 mg/actuation spry 4 mg by Nasal route as needed for up to 2 doses. Indications: OPIOID TOXICITY 1 Box 1     No current facility-administered medications on file prior to visit. Allergies   Allergen Reactions    Aspirin Other (comments)    Nsaids (Non-Steroidal Anti-Inflammatory Drug) Other (comments)     Pt has had bleeding ulcers       Social History     Tobacco Use    Smoking status: Current Every Day Smoker     Packs/day: 1.00     Years: 37.00     Pack years: 37.00     Types: Cigarettes    Smokeless tobacco: Never Used   Substance Use Topics    Alcohol use: No    Drug use: No       No family history on file.       ROS:   Positives are bolded  General: fevers, chills, night sweats, fatigue, weight loss, weight gain  GI: nausea, vomiting, abdominal pain, change in bowel habits, hematochezia, melena, GERD  Integ: dermatitis, abnormal moles  HEENT: visual changes, vertigo, epistaxis, dysphagia, hoarseness  Cardiac: chest pain, palpitations, HTN, edema, varicosities  Resp: cough, shortness of breath, wheezing, hemoptysis, snoring, reactive airway disease  : hematuria, dysuria, frequency, urgency, nocturia, stress urinary incontinence   MSK: weakness, joint pain, arthritis  Endocrine: diabetes, thyroid disease, polyuria, polydipsia, polyphagia, poor wound healing, heat intolerance, cold intolerance  Lymph/Heme: anemia, bruising, history of blood transfusions  Neuro: dizziness, headache, fainting, seizures, stroke  Psych: anxiety, depression    Physical Exam:  Visit Vitals  BP (!) 156/81   Pulse 84   Temp 98.1 °F (36.7 °C) (Skin)   Resp 18   Ht 5' 4\" (1.626 m)   Wt 64.4 kg (142 lb)   BMI 24.37 kg/m²       Gen: No distress  Head: Normocephalic, atraumatic  Mouth: Clear, no overt lesions, oral mucosa pink and moist  Neck: Supple, no masses, no adenopathy, trachea midline  Resp: Clear bilaterally  Cardio: Regular rate and rhythm  Abdomen: soft, nontender, nondistended  Extremeties: warm, well-perfused  Neuro: sensation and strength grossly intact and symmetrical  Psych: alert and oriented to person, place and time  Breasts:  Right: Examined in both the supine and upright positions. There was no supraclavicular, infraclavicular, or axillary lymphadenopathy. There were no dominant masses, no skin changes, no asymmetry identified   Left:  Examined in both the supine and upright positions. There was no supraclavicular, infraclavicular, or axillary lymphadenopathy.    There were no dominant masses, no skin changes, no asymmetry identified smooth nodule upper inner with head       Imagin21 bilateral mammogram ([facility])  No suspicious finding for malignancy.        Recommend clinical followup and annual mammography as per the Society of Breast  Imaging and the [employer] of Radiology guidelines.      BIRADS 1: Negative     2/1/19 bilateral mammogram/left ultrasound ([facility])  1. Benign findings. Likely sebaceous cyst in the left breast. This is a benign  finding. However, due to risk of infection, excision is recommended. Resume  annual screening mammography     BI-RADS Category 2 - Benign      These findings were discussed with the patient in person. Indications to return  sooner were discussed with the patient such as new palpable nodule, nipple  inversion, spontaneous nipple discharge especially if bloody, and architectural  changes. Patient demonstrated understanding.      The lack of mammographic evidence of malignancy should not deter further work-up  of a clinically significant palpable finding. Radiodense breast tissue may  obscure an early malignancy or a palpable finding. 10-15% of breast cancers are  not detected by mammography.            Impression:  Patient Active Problem List   Diagnosis Code    Hyperlipidemia E78.5    Hypovitaminosis D E55.9    Chronic pain syndrome G89.4    Degeneration of lumbar or lumbosacral intervertebral disc M51.37    Polyarthralgia M25.50    Generalized OA M15.9    Tobacco dependence syndrome F17.200    Peptic ulcer disease K27.9    Anxiety F41.9    Primary hypertension I10    Left breast mass N63.20         61year old woman with left breast mass. We discussed that based on imaging and exam, it is most likely a sebaceous cyst.  We discussed these are benign, not cancer and do not turn into cancer. They can increase in size, cause pain, and/or become infected. As hers has increased in size, she is interested in excision. Risks, benefits and options were discussed in detail to include, but not limited to, bleeding, infection, risks of anesthesia, injury to surrounding structures and other unforeseen events such as stroke, heart attack or death. She is interested in proceeding with left breast excisional biopsy.   She was asked to call with questions or concerns.

## 2021-02-08 ENCOUNTER — OFFICE VISIT (OUTPATIENT)
Dept: SURGERY | Age: 61
End: 2021-02-08
Payer: COMMERCIAL

## 2021-02-08 VITALS
TEMPERATURE: 98.1 F | BODY MASS INDEX: 24.24 KG/M2 | WEIGHT: 142 LBS | HEART RATE: 84 BPM | SYSTOLIC BLOOD PRESSURE: 156 MMHG | HEIGHT: 64 IN | RESPIRATION RATE: 18 BRPM | DIASTOLIC BLOOD PRESSURE: 81 MMHG

## 2021-02-08 DIAGNOSIS — N63.20 LEFT BREAST MASS: Primary | ICD-10-CM

## 2021-02-08 PROCEDURE — 99204 OFFICE O/P NEW MOD 45 MIN: CPT | Performed by: SURGERY

## 2021-02-08 NOTE — LETTER
2/8/2021 10:59 AM 
 
Patient:  Blanquita Fernandez YOB: 1960 Date of Visit: 2/8/2021 Swapnil Larry MD 
[place] [address] [address] [employer] Dear Swapnil Larry MD, 
 
 
I had the pleasure of seeing Ms. Odette Paulino in my office today for her left breast mass. I am including a copy of my office visit today. If you have questions, please do not hesitate to call me. I look forward to following Ms. Rey Rausch along with you and will keep you updated as to her progress. Sincerely, Rose Huffman MD

## 2021-02-10 DIAGNOSIS — N63.20 LEFT BREAST MASS: Primary | ICD-10-CM

## 2021-02-10 DIAGNOSIS — N63.20 LEFT BREAST MASS: ICD-10-CM

## 2021-03-01 ENCOUNTER — HOSPITAL ENCOUNTER (OUTPATIENT)
Dept: LAB | Age: 61
Discharge: HOME | End: 2021-03-01
Payer: COMMERCIAL

## 2021-03-01 DIAGNOSIS — N63.20 LEFT BREAST MASS: ICD-10-CM

## 2021-03-01 LAB
ANION GAP SERPL CALC-SCNC: 5 MMOL/L (ref 3–18)
BASOPHILS # BLD: 0 K/UL (ref 0–0.1)
BASOPHILS NFR BLD: 0 % (ref 0–2)
BUN SERPL-MCNC: 14 MG/DL (ref 7–18)
BUN/CREAT SERPL: 21 (ref 12–20)
CALCIUM SERPL-MCNC: 9.1 MG/DL (ref 8.5–10.1)
CHLORIDE SERPL-SCNC: 105 MMOL/L (ref 100–111)
CO2 SERPL-SCNC: 28 MMOL/L (ref 21–32)
CREAT SERPL-MCNC: 0.68 MG/DL (ref 0.6–1.3)
DIFFERENTIAL METHOD BLD: ABNORMAL
EOSINOPHIL # BLD: 0.1 K/UL (ref 0–0.4)
EOSINOPHIL NFR BLD: 1 % (ref 0–5)
ERYTHROCYTE [DISTWIDTH] IN BLOOD BY AUTOMATED COUNT: 12.5 % (ref 11.6–14.5)
GLUCOSE SERPL-MCNC: 92 MG/DL (ref 74–99)
HCT VFR BLD AUTO: 34.2 % (ref 35–45)
HGB BLD-MCNC: 10.8 G/DL (ref 12–16)
LYMPHOCYTES # BLD: 1.5 K/UL (ref 0.9–3.6)
LYMPHOCYTES NFR BLD: 27 % (ref 21–52)
MCH RBC QN AUTO: 28.8 PG (ref 24–34)
MCHC RBC AUTO-ENTMCNC: 31.6 G/DL (ref 31–37)
MCV RBC AUTO: 91.2 FL (ref 74–97)
MONOCYTES # BLD: 0.5 K/UL (ref 0.05–1.2)
MONOCYTES NFR BLD: 8 % (ref 3–10)
NEUTS SEG # BLD: 3.6 K/UL (ref 1.8–8)
NEUTS SEG NFR BLD: 64 % (ref 40–73)
PLATELET # BLD AUTO: 378 K/UL (ref 135–420)
PMV BLD AUTO: 9.3 FL (ref 9.2–11.8)
POTASSIUM SERPL-SCNC: 4.3 MMOL/L (ref 3.5–5.5)
RBC # BLD AUTO: 3.75 M/UL (ref 4.2–5.3)
SODIUM SERPL-SCNC: 138 MMOL/L (ref 136–145)
WBC # BLD AUTO: 5.6 K/UL (ref 4.6–13.2)

## 2021-03-01 PROCEDURE — 85025 COMPLETE CBC W/AUTO DIFF WBC: CPT

## 2021-03-01 PROCEDURE — 36415 COLL VENOUS BLD VENIPUNCTURE: CPT

## 2021-03-01 PROCEDURE — 80048 BASIC METABOLIC PNL TOTAL CA: CPT

## 2021-03-01 PROCEDURE — U0003 INFECTIOUS AGENT DETECTION BY NUCLEIC ACID (DNA OR RNA); SEVERE ACUTE RESPIRATORY SYNDROME CORONAVIRUS 2 (SARS-COV-2) (CORONAVIRUS DISEASE [COVID-19]), AMPLIFIED PROBE TECHNIQUE, MAKING USE OF HIGH THROUGHPUT TECHNOLOGIES AS DESCRIBED BY CMS-2020-01-R: HCPCS

## 2021-03-01 PROCEDURE — 93005 ELECTROCARDIOGRAM TRACING: CPT

## 2021-03-02 LAB
ATRIAL RATE: 102 BPM
CALCULATED P AXIS, ECG09: 53 DEGREES
CALCULATED R AXIS, ECG10: 46 DEGREES
CALCULATED T AXIS, ECG11: 57 DEGREES
DIAGNOSIS, 93000: NORMAL
P-R INTERVAL, ECG05: 154 MS
Q-T INTERVAL, ECG07: 350 MS
QRS DURATION, ECG06: 84 MS
QTC CALCULATION (BEZET), ECG08: 456 MS
VENTRICULAR RATE, ECG03: 102 BPM

## 2021-03-03 LAB — SARS-COV-2, COV2NT: NOT DETECTED

## 2021-03-05 ENCOUNTER — HOSPITAL ENCOUNTER (OUTPATIENT)
Age: 61
Setting detail: OUTPATIENT SURGERY
Discharge: HOME | End: 2021-03-05
Attending: SURGERY | Admitting: SURGERY
Payer: COMMERCIAL

## 2021-03-05 ENCOUNTER — ANESTHESIA EVENT (OUTPATIENT)
Dept: SURGERY | Age: 61
End: 2021-03-05
Payer: COMMERCIAL

## 2021-03-05 ENCOUNTER — ANESTHESIA (OUTPATIENT)
Dept: SURGERY | Age: 61
End: 2021-03-05
Payer: COMMERCIAL

## 2021-03-05 VITALS
HEIGHT: 64 IN | BODY MASS INDEX: 24.59 KG/M2 | TEMPERATURE: 97.2 F | HEART RATE: 84 BPM | RESPIRATION RATE: 16 BRPM | DIASTOLIC BLOOD PRESSURE: 70 MMHG | WEIGHT: 144 LBS | OXYGEN SATURATION: 93 % | SYSTOLIC BLOOD PRESSURE: 132 MMHG

## 2021-03-05 DIAGNOSIS — N63.20 LEFT BREAST MASS: ICD-10-CM

## 2021-03-05 PROCEDURE — 74011000250 HC RX REV CODE- 250: Performed by: NURSE ANESTHETIST, CERTIFIED REGISTERED

## 2021-03-05 PROCEDURE — 77030040361 HC SLV COMPR DVT MDII -B: Performed by: SURGERY

## 2021-03-05 PROCEDURE — 88307 TISSUE EXAM BY PATHOLOGIST: CPT

## 2021-03-05 PROCEDURE — 76210000020 HC REC RM PH II FIRST 0.5 HR: Performed by: SURGERY

## 2021-03-05 PROCEDURE — 77030031139 HC SUT VCRL2 J&J -A: Performed by: SURGERY

## 2021-03-05 PROCEDURE — 74011250636 HC RX REV CODE- 250/636: Performed by: SURGERY

## 2021-03-05 PROCEDURE — 76060000032 HC ANESTHESIA 0.5 TO 1 HR: Performed by: SURGERY

## 2021-03-05 PROCEDURE — 88304 TISSUE EXAM BY PATHOLOGIST: CPT

## 2021-03-05 PROCEDURE — 19120 REMOVAL OF BREAST LESION: CPT | Performed by: SURGERY

## 2021-03-05 PROCEDURE — 00400 ANES INTEGUMENTARY SYS NOS: CPT | Performed by: NURSE ANESTHETIST, CERTIFIED REGISTERED

## 2021-03-05 PROCEDURE — 76210000063 HC OR PH I REC FIRST 0.5 HR: Performed by: SURGERY

## 2021-03-05 PROCEDURE — 77030002933 HC SUT MCRYL J&J -A: Performed by: SURGERY

## 2021-03-05 PROCEDURE — 77030010507 HC ADH SKN DERMBND J&J -B: Performed by: SURGERY

## 2021-03-05 PROCEDURE — 2709999900 HC NON-CHARGEABLE SUPPLY: Performed by: SURGERY

## 2021-03-05 PROCEDURE — 74011000272 HC RX REV CODE- 272: Performed by: SURGERY

## 2021-03-05 PROCEDURE — 74011000250 HC RX REV CODE- 250: Performed by: SURGERY

## 2021-03-05 PROCEDURE — 76010000138 HC OR TIME 0.5 TO 1 HR: Performed by: SURGERY

## 2021-03-05 PROCEDURE — 74011250636 HC RX REV CODE- 250/636: Performed by: NURSE ANESTHETIST, CERTIFIED REGISTERED

## 2021-03-05 PROCEDURE — 00400 ANES INTEGUMENTARY SYS NOS: CPT | Performed by: ANESTHESIOLOGY

## 2021-03-05 RX ORDER — ONDANSETRON 2 MG/ML
4 INJECTION INTRAMUSCULAR; INTRAVENOUS ONCE
Status: DISCONTINUED | OUTPATIENT
Start: 2021-03-05 | End: 2021-03-05 | Stop reason: HOSPADM

## 2021-03-05 RX ORDER — FENTANYL CITRATE 50 UG/ML
50 INJECTION, SOLUTION INTRAMUSCULAR; INTRAVENOUS AS NEEDED
Status: DISCONTINUED | OUTPATIENT
Start: 2021-03-05 | End: 2021-03-05 | Stop reason: HOSPADM

## 2021-03-05 RX ORDER — DEXAMETHASONE SODIUM PHOSPHATE 4 MG/ML
INJECTION, SOLUTION INTRA-ARTICULAR; INTRALESIONAL; INTRAMUSCULAR; INTRAVENOUS; SOFT TISSUE AS NEEDED
Status: DISCONTINUED | OUTPATIENT
Start: 2021-03-05 | End: 2021-03-05 | Stop reason: HOSPADM

## 2021-03-05 RX ORDER — MIDAZOLAM HYDROCHLORIDE 1 MG/ML
INJECTION, SOLUTION INTRAMUSCULAR; INTRAVENOUS AS NEEDED
Status: DISCONTINUED | OUTPATIENT
Start: 2021-03-05 | End: 2021-03-05 | Stop reason: HOSPADM

## 2021-03-05 RX ORDER — SODIUM CHLORIDE, SODIUM LACTATE, POTASSIUM CHLORIDE, CALCIUM CHLORIDE 600; 310; 30; 20 MG/100ML; MG/100ML; MG/100ML; MG/100ML
25 INJECTION, SOLUTION INTRAVENOUS CONTINUOUS
Status: DISCONTINUED | OUTPATIENT
Start: 2021-03-05 | End: 2021-03-05 | Stop reason: HOSPADM

## 2021-03-05 RX ORDER — BUPIVACAINE HYDROCHLORIDE AND EPINEPHRINE 5; 5 MG/ML; UG/ML
INJECTION, SOLUTION EPIDURAL; INTRACAUDAL; PERINEURAL AS NEEDED
Status: DISCONTINUED | OUTPATIENT
Start: 2021-03-05 | End: 2021-03-05 | Stop reason: HOSPADM

## 2021-03-05 RX ORDER — LIDOCAINE HYDROCHLORIDE 20 MG/ML
INJECTION, SOLUTION EPIDURAL; INFILTRATION; INTRACAUDAL; PERINEURAL AS NEEDED
Status: DISCONTINUED | OUTPATIENT
Start: 2021-03-05 | End: 2021-03-05 | Stop reason: HOSPADM

## 2021-03-05 RX ORDER — PROPOFOL 10 MG/ML
INJECTION, EMULSION INTRAVENOUS AS NEEDED
Status: DISCONTINUED | OUTPATIENT
Start: 2021-03-05 | End: 2021-03-05 | Stop reason: HOSPADM

## 2021-03-05 RX ORDER — FENTANYL CITRATE 50 UG/ML
INJECTION, SOLUTION INTRAMUSCULAR; INTRAVENOUS AS NEEDED
Status: DISCONTINUED | OUTPATIENT
Start: 2021-03-05 | End: 2021-03-05 | Stop reason: HOSPADM

## 2021-03-05 RX ORDER — ONDANSETRON 2 MG/ML
INJECTION INTRAMUSCULAR; INTRAVENOUS AS NEEDED
Status: DISCONTINUED | OUTPATIENT
Start: 2021-03-05 | End: 2021-03-05 | Stop reason: HOSPADM

## 2021-03-05 RX ORDER — CEFAZOLIN SODIUM 2 G/50ML
2 SOLUTION INTRAVENOUS
Status: COMPLETED | OUTPATIENT
Start: 2021-03-05 | End: 2021-03-05

## 2021-03-05 RX ORDER — SODIUM CHLORIDE, SODIUM LACTATE, POTASSIUM CHLORIDE, CALCIUM CHLORIDE 600; 310; 30; 20 MG/100ML; MG/100ML; MG/100ML; MG/100ML
INJECTION, SOLUTION INTRAVENOUS
Status: DISCONTINUED | OUTPATIENT
Start: 2021-03-05 | End: 2021-03-05 | Stop reason: HOSPADM

## 2021-03-05 RX ORDER — HYDROMORPHONE HYDROCHLORIDE 1 MG/ML
0.5 INJECTION, SOLUTION INTRAMUSCULAR; INTRAVENOUS; SUBCUTANEOUS
Status: DISCONTINUED | OUTPATIENT
Start: 2021-03-05 | End: 2021-03-05 | Stop reason: HOSPADM

## 2021-03-05 RX ADMIN — DEXAMETHASONE SODIUM PHOSPHATE 8 MG: 4 INJECTION, SOLUTION INTRA-ARTICULAR; INTRALESIONAL; INTRAMUSCULAR; INTRAVENOUS; SOFT TISSUE at 09:02

## 2021-03-05 RX ADMIN — FENTANYL CITRATE 25 MCG: 50 INJECTION, SOLUTION INTRAMUSCULAR; INTRAVENOUS at 09:03

## 2021-03-05 RX ADMIN — FENTANYL CITRATE 25 MCG: 50 INJECTION, SOLUTION INTRAMUSCULAR; INTRAVENOUS at 09:02

## 2021-03-05 RX ADMIN — ONDANSETRON 4 MG: 2 SOLUTION INTRAMUSCULAR; INTRAVENOUS at 09:02

## 2021-03-05 RX ADMIN — CEFAZOLIN 2 G: 10 INJECTION, POWDER, FOR SOLUTION INTRAVENOUS at 09:00

## 2021-03-05 RX ADMIN — LIDOCAINE HYDROCHLORIDE 50 MG: 20 INJECTION, SOLUTION INTRAVENOUS at 08:56

## 2021-03-05 RX ADMIN — PROPOFOL 150 MG: 10 INJECTION, EMULSION INTRAVENOUS at 08:56

## 2021-03-05 RX ADMIN — MIDAZOLAM 2 MG: 1 INJECTION INTRAMUSCULAR; INTRAVENOUS at 08:47

## 2021-03-05 RX ADMIN — SODIUM CHLORIDE, SODIUM LACTATE, POTASSIUM CHLORIDE, AND CALCIUM CHLORIDE: 600; 310; 30; 20 INJECTION, SOLUTION INTRAVENOUS at 08:47

## 2021-03-05 NOTE — DISCHARGE INSTRUCTIONS
Patient Education        Open Breast Biopsy: What to Expect at Home  Your Recovery  An open breast biopsy is surgery to remove abnormal breast tissue. The breast tissue will be sent to a lab, where a doctor will look at the tissue under a microscope to check for breast cancer. Your doctor may have some answers right away. But it can take up to 1 to 2 weeks to get the final results. Your doctor will discuss the results with you. For a few days after the surgery, you will probably feel tired and have some pain. The skin around the cut (incision) may feel firm, swollen, and tender. The area may be bruised. Tenderness should go away in about a week, and the bruising will fade within two weeks. Firmness and swelling may last 6 to 8 weeks. Your incision may have been closed with strips of tape or stitches. If you have strips of tape on the incision, leave the tape on for a week or until it falls off. If you have stitches, your doctor will remove them in about a week. This care sheet gives you a general idea about how long it will take for you to recover. But each person recovers at a different pace. Follow the steps below to get better as quickly as possible. How can you care for yourself at home? Activity    · Rest when you feel tired. Getting enough sleep will help you recover.     · Try to walk each day. Start by walking a little more than you did the day before. Bit by bit, increase the amount you walk. Walking boosts blood flow and helps prevent pneumonia and constipation.     · For 2 weeks, avoid strenuous activities that put pressure on your chest or that involve vigorous movement of your upper body and arm on the side of the biopsy. Examples of these might include strenuous housework, holding an active child, jogging, or aerobic exercise.     · For 2 weeks, avoid lifting anything that would make you strain.  This may include heavy grocery bags and milk containers, a heavy briefcase or backpack, cat litter or dog food bags, a vacuum , or a child.     · Ask your doctor when you can drive again.     · You will probably need to take 1 or 2 days off from work. This depends on the type of work you do and how you feel. Diet    · You can eat your normal diet. If your stomach is upset, try bland, low-fat foods like plain rice, broiled chicken, toast, and yogurt. Medicines    · Your doctor will tell you if and when you can restart your medicines. He or she will also give you instructions about taking any new medicines.     · If you take aspirin or some other blood thinner, ask your doctor if and when to start taking it again. Make sure that you understand exactly what your doctor wants you to do.     · Be safe with medicines. Take pain medicines exactly as directed. ? If the doctor gave you a prescription medicine for pain, take it as prescribed. ? If you are not taking a prescription pain medicine, ask your doctor if you can take an over-the-counter medicine.     · If you think your pain medicine is making you sick to your stomach:  ? Take your medicine after meals (unless your doctor has told you not to). ? Ask your doctor for a different pain medicine.     · If your doctor prescribed antibiotics, take them as directed. Do not stop taking them just because you feel better. You need to take the full course of antibiotics. Incision care    · If you have strips of tape on the incision, leave the tape on for a week or until it falls off.     · Wash the area daily with warm, soapy water, and pat it dry. Don't use hydrogen peroxide or alcohol, which can slow healing. You may cover the area with a gauze bandage if it weeps or rubs against clothing. Change the bandage every day.     · Keep the area clean and dry. Other instructions    · For the first 3 days after surgery, wear a supportive bra all the time, even at night. Follow-up care is a key part of your treatment and safety.  Be sure to make and go to all appointments, and call your doctor if you are having problems. It's also a good idea to know your test results and keep a list of the medicines you take. When should you call for help? Call 911 anytime you think you may need emergency care. For example, call if:    · You passed out (lost consciousness).     · You have chest pain, are short of breath, or cough up blood. Call your doctor now or seek immediate medical care if:    · You are sick to your stomach or cannot drink fluids.     · You have pain that does not get better after you take pain medicine.     · You cannot pass stools or gas.     · You have signs of a blood clot in your leg (called a deep vein thrombosis), such as:  ? Pain in your calf, back of the knee, thigh, or groin. ? Redness or swelling in your leg.     · You have signs of infection, such as:  ? Increased pain, swelling, warmth, or redness. ? Red streaks leading from the incision. ? Pus draining from the incision. ? A fever.     · You have loose stitches, or your incision comes open.     · Bright red blood has soaked through the bandage over your incision. Watch closely for changes in your health, and be sure to contact your doctor if:    · You have any problems.     · You have new or worse swelling or pain in your arm. Where can you learn more? Go to http://www.gray.com/  Enter E897 in the search box to learn more about \"Open Breast Biopsy: What to Expect at Home. \"  Current as of: April 29, 2020               Content Version: 12.6  © 2006-2020 [employer]. Care instructions adapted under license by [employer] (which disclaims liability or warranty for this information). If you have questions about a medical condition or this instruction, always ask your healthcare professional. [place] [address] any warranty or liability for your use of this information.          DISCHARGE SUMMARY from Nurse    PATIENT INSTRUCTIONS:    After general anesthesia or intravenous sedation, for 24 hours or while taking prescription Narcotics:  · Limit your activities  · Do not drive and operate hazardous machinery  · Do not make important personal or business decisions  · Do  not drink alcoholic beverages  · If you have not urinated within 8 hours after discharge, please contact your surgeon on call. Report the following to your surgeon:  · Excessive pain, swelling, redness or odor of or around the surgical area  · Temperature over 100.5  · Nausea and vomiting lasting longer than 4 hours or if unable to take medications        *  Please give a list of your current medications to your Primary Care Provider. *  Please update this list whenever your medications are discontinued, doses are      changed, or new medications (including over-the-counter products) are added. *  Please carry medication information at all times in case of emergency situations. These are general instructions for a healthy lifestyle:    No smoking/ No tobacco products/ Avoid exposure to second hand smoke  Surgeon General's Warning:  Quitting smoking now greatly reduces serious risk to your health. Obesity, smoking, and sedentary lifestyle greatly increases your risk for illness    A healthy diet, regular physical exercise & weight monitoring are important for maintaining a healthy lifestyle    You may be retaining fluid if you have a history of heart failure or if you experience any of the following symptoms:  Weight gain of 3 pounds or more overnight or 5 pounds in a week, increased swelling in our hands or feet or shortness of breath while lying flat in bed. Please call your doctor as soon as you notice any of these symptoms; do not wait until your next office visit. Patient armband removed and shredded    The discharge information has been reviewed with the patient. The patient verbalized understanding.   Discharge medications reviewed with the patient and appropriate educational materials and side effects teaching were provided.   ___________________________________________________________________________________________________________________________________

## 2021-03-05 NOTE — INTERVAL H&P NOTE
Update History & Physical 
 
The Patient's History and Physical   was reviewed with the patient and I examined the patient. There was no change. The surgical site was confirmed by the patient and me. Plan:  The risk, benefits, expected outcome, and alternative to the recommended procedure have been discussed with the patient. Patient understands and wants to proceed with the procedure.  
 
Electronically signed by Cristino Kraus MD on 3/5/2021 at 8:29 AM

## 2021-03-05 NOTE — PERIOP NOTES
Pre-Op Summary    Pt arrived via car with family/friend and is oriented to time, place, person and situation. Patient with steady gait with none assistive devices. Visit Vitals  BP (!) 140/76 (BP 1 Location: Right upper arm, BP Patient Position: At rest)   Pulse 88   Temp 98 °F (36.7 °C)   Resp 18   Ht 5' 4\" (1.626 m)   Wt 65.3 kg (144 lb)   SpO2 100%   BMI 24.72 kg/m²       Peripheral IV located on Right hand . Patients belongings are located storage. Patient's point of contact is Saulo Morales and their contact number is: [phone]. They will be leaving and coming back. They are able to receive medication information. They will be their ride home.

## 2021-03-05 NOTE — PERIOP NOTES
Pt arrives from OR. Placed on monitors. VSS. Chart, MAR and anesthesia record reviewed. Will monitor satus  0940 updated Quan on pt status. Doing well  Report to [facility]. Opportunity for questions offered, clarification provided.  VSS, pain managed

## 2021-03-05 NOTE — OP NOTES
Date of Procedure: 3/5/2021  Preoperative Diagnosis: left breast mass N63.20  Postoperative Diagnosis: left breast mass N63.20  Procedure(s):  Procedure(s):  LEFT BREAST EXCISIONAL  BIOPSY    Surgeon(s) and Role:      Bayron Wheeler MD - Primary         Surgical Staff: Circ-1: Antonette Austin RN  Scrub Tech-1: Rachael Stanford  Surg Asst-1: Pastora De Leon      Event Time In     Event Time In   Incision Start 0908   Incision Close      Event Time In   Patient In - Facility (Arrived) 0622   Patient In - Pre-op 0631   Anesthesia Start 0663   Patient Ready for OR 2278   Surgeon Available 0840   Patient Out - Pre-op 0845   Patient In - [facility]   Surgeon In [address] 0906   Incision Start 0908   Incision Close    Surgeon out of OR 0914   Patient Out - OR    Anesthesia Stop    Patient In - Phase I    Notice to Anesthesia    Care Complete - Phase I    Patient Out - Phase I    Patient In - Phase II    Patient Tolerates Liquids    Patient Ready for Visitors    Patient Education Complete    Patient Voided    Care Complete - Phase II    Patient Out - Phase II    End of Periop Care    Patient In - Overflow    Patient Out - Overflow        Anesthesia: General  Anesthesia staff: Anesthesiologist: Karin Rasmussen MD  CRNA: Leena Childers CRNA  Estimated Blood Loss: 2cc  Specimens:   ID Type Source Tests Collected by Time Destination   1 : Left breast mass short superior long lateral Preservative Breast  Tanya Morris MD 3/5/2021 0913 Pathology        Findings: cystic lesion consistent with sebaceous cyst   Complications: none noted  Implants: * No implants in log *      The patient was identified in the preoperative holding area and the risks again were reviewed with the patient who understands and agrees. She understands the risk of bleeding, infection, damage to surrounding structures, hematoma/seroma formation, and the possibility of missing the lesion in question.  She also understands additional surgery may be indicated. She was also marked by me to confirm the site and the procedure. The patient was taken to the operating suite and placed supine on the table. Compression stockings were placed and anesthesia induced without complication. She was then prepped and draped in the usual sterile fashion and an appropriate time-out was performed to confirm the patient, the side, and the procedure. Local anesthetic was infiltrated. An elliptical incision was made at the medial position in the left breast overlying palpable mass. We then dissected into the subcutaneous tissue of the breast towards the lesion of concern. We then used electrocautery to remove a core of breast tissue around the lesion with attention to margns. There was excellent hemostasis and the specimen was marked for orientation on removal of the tissue. This was then handed off the field for pathologic analysis. All sponge and instrument counts were reported to me as correct. We continued the closure with a 3-0 vicryl suture, followed by 4-0 monocryl suture. Dermabond was then applied. The family was updated after the procedure. The patient was taken to recovery in good condition.

## 2021-03-05 NOTE — ANESTHESIA POSTPROCEDURE EVALUATION
Procedure(s):  LEFT BREAST EXCISIONAL  BIOPSY. general    Anesthesia Post Evaluation      Multimodal analgesia: multimodal analgesia used between 6 hours prior to anesthesia start to PACU discharge  Patient location during evaluation: PACU  Patient participation: complete - patient participated  Level of consciousness: awake and alert  Pain management: adequate  Airway patency: patent  Anesthetic complications: no  Cardiovascular status: acceptable  Respiratory status: acceptable  Hydration status: acceptable  Post anesthesia nausea and vomiting:  none  Final Post Anesthesia Temperature Assessment:  Normothermia (36.0-37.5 degrees C)      INITIAL Post-op Vital signs:   Vitals Value Taken Time   /79 03/05/21 0942   Temp 36.3 °C (97.4 °F) 03/05/21 0942   Pulse 80 03/05/21 0955   Resp 14 03/05/21 0955   SpO2 93 % 03/05/21 0955   Vitals shown include unvalidated device data.

## 2021-03-05 NOTE — ANESTHESIA PREPROCEDURE EVALUATION
Relevant Problems   No relevant active problems       Anesthetic History   No history of anesthetic complications            Review of Systems / Medical History  Patient summary reviewed, nursing notes reviewed and pertinent labs reviewed    Pulmonary    COPD: mild      Smoker      Comments: 40+ pack/yrs   Neuro/Psych              Cardiovascular    Hypertension                   GI/Hepatic/Renal     GERD: well controlled      PUD     Endo/Other        Arthritis and anemia     Other Findings   Comments: Breast mass,           Physical Exam    Airway  Mallampati: III    Neck ROM: normal range of motion   Mouth opening: Normal     Cardiovascular    Rhythm: regular  Rate: normal         Dental    Dentition: Poor dentition     Pulmonary  Breath sounds clear to auscultation               Abdominal         Other Findings            Anesthetic Plan    ASA: 3  Anesthesia type: general          Induction: Intravenous  Anesthetic plan and risks discussed with: Patient

## 2021-03-08 ENCOUNTER — HOSPITAL ENCOUNTER (OUTPATIENT)
Dept: LAB | Age: 61
Discharge: HOME | End: 2021-03-08
Payer: COMMERCIAL

## 2021-03-08 ENCOUNTER — OFFICE VISIT (OUTPATIENT)
Dept: ONCOLOGY | Age: 61
End: 2021-03-08
Payer: COMMERCIAL

## 2021-03-08 VITALS
RESPIRATION RATE: 20 BRPM | TEMPERATURE: 97.5 F | WEIGHT: 146 LBS | OXYGEN SATURATION: 99 % | DIASTOLIC BLOOD PRESSURE: 89 MMHG | HEART RATE: 104 BPM | HEIGHT: 64 IN | SYSTOLIC BLOOD PRESSURE: 159 MMHG | BODY MASS INDEX: 24.92 KG/M2

## 2021-03-08 DIAGNOSIS — D50.9 IRON DEFICIENCY ANEMIA, UNSPECIFIED IRON DEFICIENCY ANEMIA TYPE: ICD-10-CM

## 2021-03-08 DIAGNOSIS — D64.9 NORMOCYTIC ANEMIA: ICD-10-CM

## 2021-03-08 DIAGNOSIS — D50.9 IRON DEFICIENCY ANEMIA, UNSPECIFIED IRON DEFICIENCY ANEMIA TYPE: Primary | ICD-10-CM

## 2021-03-08 DIAGNOSIS — D64.9 CHRONIC ANEMIA: ICD-10-CM

## 2021-03-08 LAB
ALBUMIN SERPL-MCNC: 3.5 G/DL (ref 3.4–5)
ALBUMIN/GLOB SERPL: 1.1 {RATIO} (ref 0.8–1.7)
ALP SERPL-CCNC: 91 U/L (ref 45–117)
ALT SERPL-CCNC: 15 U/L (ref 13–56)
ANION GAP SERPL CALC-SCNC: 6 MMOL/L (ref 3–18)
AST SERPL-CCNC: 11 U/L (ref 10–38)
BASOPHILS # BLD: 0 K/UL (ref 0–0.1)
BASOPHILS NFR BLD: 1 % (ref 0–2)
BILIRUB SERPL-MCNC: 0.7 MG/DL (ref 0.2–1)
BUN SERPL-MCNC: 11 MG/DL (ref 7–18)
BUN/CREAT SERPL: 15 (ref 12–20)
CALCIUM SERPL-MCNC: 8.3 MG/DL (ref 8.5–10.1)
CHLORIDE SERPL-SCNC: 107 MMOL/L (ref 100–111)
CO2 SERPL-SCNC: 28 MMOL/L (ref 21–32)
CREAT SERPL-MCNC: 0.72 MG/DL (ref 0.6–1.3)
DIFFERENTIAL METHOD BLD: ABNORMAL
EOSINOPHIL # BLD: 0.1 K/UL (ref 0–0.4)
EOSINOPHIL NFR BLD: 3 % (ref 0–5)
ERYTHROCYTE [DISTWIDTH] IN BLOOD BY AUTOMATED COUNT: 12.5 % (ref 11.6–14.5)
FERRITIN SERPL-MCNC: 5 NG/ML (ref 8–388)
FOLATE SERPL-MCNC: 4.1 NG/ML (ref 3.1–17.5)
GLOBULIN SER CALC-MCNC: 3.2 G/DL (ref 2–4)
GLUCOSE SERPL-MCNC: 97 MG/DL (ref 74–99)
HCT VFR BLD AUTO: 32.1 % (ref 35–45)
HGB BLD-MCNC: 10.2 G/DL (ref 12–16)
IRON SATN MFR SERPL: 8 % (ref 20–50)
IRON SERPL-MCNC: 29 UG/DL (ref 50–175)
LDH SERPL L TO P-CCNC: 152 U/L (ref 81–234)
LYMPHOCYTES # BLD: 1.7 K/UL (ref 0.9–3.6)
LYMPHOCYTES NFR BLD: 41 % (ref 21–52)
MCH RBC QN AUTO: 29.1 PG (ref 24–34)
MCHC RBC AUTO-ENTMCNC: 31.8 G/DL (ref 31–37)
MCV RBC AUTO: 91.7 FL (ref 74–97)
MONOCYTES # BLD: 0.4 K/UL (ref 0.05–1.2)
MONOCYTES NFR BLD: 9 % (ref 3–10)
NEUTS SEG # BLD: 1.9 K/UL (ref 1.8–8)
NEUTS SEG NFR BLD: 46 % (ref 40–73)
PLATELET # BLD AUTO: 348 K/UL (ref 135–420)
PMV BLD AUTO: 9.9 FL (ref 9.2–11.8)
POTASSIUM SERPL-SCNC: 4.2 MMOL/L (ref 3.5–5.5)
PROT SERPL-MCNC: 6.7 G/DL (ref 6.4–8.2)
RBC # BLD AUTO: 3.5 M/UL (ref 4.2–5.3)
RETICS/RBC NFR AUTO: 1.4 % (ref 0.5–2.3)
SODIUM SERPL-SCNC: 141 MMOL/L (ref 136–145)
TIBC SERPL-MCNC: 374 UG/DL (ref 250–450)
VIT B12 SERPL-MCNC: 329 PG/ML (ref 211–911)
WBC # BLD AUTO: 4.1 K/UL (ref 4.6–13.2)

## 2021-03-08 PROCEDURE — 36415 COLL VENOUS BLD VENIPUNCTURE: CPT

## 2021-03-08 PROCEDURE — 82607 VITAMIN B-12: CPT

## 2021-03-08 PROCEDURE — 82728 ASSAY OF FERRITIN: CPT

## 2021-03-08 PROCEDURE — 80053 COMPREHEN METABOLIC PANEL: CPT

## 2021-03-08 PROCEDURE — 82525 ASSAY OF COPPER: CPT

## 2021-03-08 PROCEDURE — 85025 COMPLETE CBC W/AUTO DIFF WBC: CPT

## 2021-03-08 PROCEDURE — 83010 ASSAY OF HAPTOGLOBIN QUANT: CPT

## 2021-03-08 PROCEDURE — 83540 ASSAY OF IRON: CPT

## 2021-03-08 PROCEDURE — 83921 ORGANIC ACID SINGLE QUANT: CPT

## 2021-03-08 PROCEDURE — 82784 ASSAY IGA/IGD/IGG/IGM EACH: CPT

## 2021-03-08 PROCEDURE — 85045 AUTOMATED RETICULOCYTE COUNT: CPT

## 2021-03-08 PROCEDURE — 83615 LACTATE (LD) (LDH) ENZYME: CPT

## 2021-03-08 PROCEDURE — 99204 OFFICE O/P NEW MOD 45 MIN: CPT | Performed by: INTERNAL MEDICINE

## 2021-03-08 RX ORDER — VENLAFAXINE HYDROCHLORIDE 75 MG/1
CAPSULE, EXTENDED RELEASE ORAL
COMMUNITY
Start: 2021-02-19

## 2021-03-08 RX ORDER — BUSPIRONE HYDROCHLORIDE 30 MG/1
TABLET ORAL
COMMUNITY
Start: 2021-01-03

## 2021-03-08 RX ORDER — ATORVASTATIN CALCIUM 20 MG/1
TABLET, FILM COATED ORAL
COMMUNITY
Start: 2021-01-14 | End: 2021-05-14 | Stop reason: DRUGHIGH

## 2021-03-08 RX ORDER — BUTALBITAL, ACETAMINOPHEN AND CAFFEINE 50; 325; 40 MG/1; MG/1; MG/1
TABLET ORAL
COMMUNITY
Start: 2021-03-04

## 2021-03-08 NOTE — LETTER
3/8/2021 Patient: Jensen Sampson YOB: 1960 Date of Visit: 3/8/2021 Karol Long MD 
[address] [place] [address] [address] [employer] Dear Karol Long MD, Thank you for referring Ms. Sarahi Hercules to [facility] for evaluation. My notes for this consultation are attached. If you have questions, please do not hesitate to call me. I look forward to following your patient along with you. Sincerely, Quang Tristan MD

## 2021-03-08 NOTE — PROGRESS NOTES
Hematology/Oncology Consultation Note      Date: 3/8/2021    Name: Charlotte Corea  : 1960        Ale Joe MD         Subjective:     Chief complaint: Iron deficiency anemia    History of Present Illness:   Ms. Kamaljit Richards is a most pleasant 61y.o. year old female who was seen for consultation of iron deficiency anemia. The patient has a past medical history significant of gastritis, mild duodenal deformity, and iron deficiency anemia. Lab reviews indicated chronic anemia since at least 2011 hemoglobin 10.4.  3/1/2021 CBC reported hemoglobin 10.8, hematocrit 34.2%, total WBC 5.6, and platelet 402,036.  1838 iron saturation was 12%, TIBC 361.  10/30/2020 ferritin was low at 15. The patient reported she could not tolerate iron pills as it made her sick, nausea, and being tired. She reported chronic fatigue, low energy level, and ice craving. The patient otherwise has no other complaints. Denied fever, chills, night sweat, unintentional weight loss, skin lumps or bumps, acute bleeding or bruising issues. No acute bleeding, blood in stool, dark stool, melena, hematochezia, hemoptysis, dark urine, or easily bruising. Denied headache, acute vision change, dizziness, chest pain, worsen shortness of breath, palpitation, productive cough, nausea, vomiting, abdominal pain, altered bowel habits, dysuria, worsen bone pain or back pain, new focal numbness or weakness. Oncologic History:  Oncology History    No history exists.        Past Medical History, Family History, and Social History:    Past Medical History:   Diagnosis Date    Acne     Dr. Braulio Ivey Allergic rhinitis     Dr. Tommie Walsh    Anemia, iron deficiency 2020    Dr Fredy Mcconnell; has not tried other ssris    Chronic migraine without aura     failed beta blocker, depakote, topamax per pt    Chronic pain     Dr Eleno Russo    Degeneration of cervical intervertebral disc     Degeneration of lumbar intervertebral disc     Dr Maribel Fajardo mri 2011 showed degen changes L1-L2 and L3-L4, mild to mod bilat foraminal narrowing    Fibromyalgia syndrome     Ganglion cyst     GERD (gastroesophageal reflux disease)     Hyperlipidemia     calculated 10 year risk score 4.6% (9/15); 4.9% (9/16); 7.1% (1/19); 9% (6/20); 11% (9/20)    Hypovitaminosis D     Primary hypertension 4/2/2019    PUD (peptic ulcer disease)     Dr. Shelly Morrison, duodenal w gi bleed (2011);  Dr Rock Hubbard pyloric ulcer (7/2/20)    Spinal stenosis in cervical region     Tension headache, chronic     uses fioricet    Tobacco dependence syndrome     declined meds; able to quit on her own but not ready due to stressors     Past Surgical History:   Procedure Laterality Date    COLONOSCOPY N/A 7/2/2020    Dr Shelly Morrison 3/28/11 neg; Dr Rock Hubbard 7/2/20 neg    HX BREAST BIOPSY Left 3/5/2021    LEFT BREAST EXCISIONAL  BIOPSY performed by Sergio [facility]ahona [facility] at [facility]      Dr Rock Hubbard 10/30/20 hp neg   Camellia Cumber Dr Kathlee Dance; 7 hand surgeries   [facility] foot surgery    HX ORTHOPAEDIC  2000s    h/o pelvis fracture    NEUROLOGICAL PROCEDURE UNLISTED      Remove Lipomas from lower back     Social History     Socioeconomic History    Marital status:      Spouse name: Not on file    Number of children: 1    Years of education: Not on file    Highest education level: Not on file   Occupational History    Occupation: owns [employer]   Social Needs    Financial resource strain: Not on file    Food insecurity     Worry: Not on file     Inability: Not on file   [employer] needs     Medical: Not on file     Non-medical: Not on file   Tobacco Use    Smoking status: Current Every Day Smoker     Packs/day: 0.50     Years: 37.00     Pack years: 18.50     Types: Cigarettes    Smokeless tobacco: Never Used   Substance and Sexual Activity    Alcohol use: No    Drug use: Never    Sexual activity: Not on file Lifestyle    Physical activity     Days per week: Not on file     Minutes per session: Not on file    Stress: Not on file   Relationships    Social connections     Talks on phone: Not on file     Gets together: Not on file     Attends Baptism service: Not on file     Active member of club or organization: Not on file     Attends meetings of clubs or organizations: Not on file     Relationship status: Not on file    Intimate partner violence     Fear of current or ex partner: Not on file     Emotionally abused: Not on file     Physically abused: Not on file     Forced sexual activity: Not on file   Other Topics Concern    Not on file   Social History Narrative    Not on file     Family History   Problem Relation Age of Onset    Stroke Father      Current Outpatient Medications   Medication Sig Dispense Refill    atorvastatin (LIPITOR) 20 mg tablet TAKE 1 TABLET BY MOUTH EVERY DAY      butalbital-acetaminophen-caffeine (FIORICET, ESGIC) -40 mg per tablet TAKE 2 TABLETS ORALLY 3 TIMES A DAY FOR CHRONIC HEADACHES.  busPIRone (BUSPAR) 30 mg tablet TAKE 1 TABLET BY MOUTH TWICE A DAY      venlafaxine-SR (EFFEXOR-XR) 75 mg capsule TAKE 3 CAPSULES BY MOUTH EVERY DAY      amLODIPine (NORVASC) 5 mg tablet TAKE 1 TABLET BY MOUTH EVERY DAY 30 Tab 0    cetirizine HCl (ZYRTEC PO) Take  by mouth as needed.  LORazepam (Ativan) 1 mg tablet Take 1 mg by mouth two (2) times daily as needed for Anxiety.  cholecalciferol (VITAMIN D3) (2,000 UNITS /50 MCG) cap capsule Take  by mouth daily.  HYDROcodone-acetaminophen (NORCO)  mg tablet Take 1 Tab by mouth every six (6) hours as needed for Pain. Indications: pain      pantoprazole (PROTONIX) 40 mg tablet Take 1 Tab by mouth daily. 180 Tab 3    buPROPion XL (Wellbutrin XL) 300 mg XL tablet Take 300 mg by mouth nightly.  SUMAtriptan (IMITREX) 100 mg tablet Take 1 Tab by mouth once as needed for Migraine for up to 1 dose.  Indications: Migraine 9 Tab 0    cyclobenzaprine (FLEXERIL) 10 mg tablet TAKE 1 TABLET BY MOUTH THREE TIMES DAILY AS NEEDED FOR MUSCLE SPASM(S)--- 90 day supply  Indications: MUSCLE SPASM 270 Tab 3    naloxone 4 mg/actuation spry 4 mg by Nasal route as needed for up to 2 doses. Indications: OPIOID TOXICITY 1 Box 1       Review of Systems   Constitutional: Positive for malaise/fatigue. Negative for chills, diaphoresis, fever and weight loss. Respiratory: Negative for cough, hemoptysis, shortness of breath and wheezing. Cardiovascular: Negative for chest pain, palpitations and leg swelling. Gastrointestinal: Negative for abdominal pain, diarrhea, heartburn, nausea and vomiting. Genitourinary: Negative for dysuria, frequency, hematuria and urgency. Musculoskeletal: Negative for joint pain and myalgias. Skin: Negative for itching and rash. Neurological: Negative for dizziness, seizures, weakness and headaches. Psychiatric/Behavioral: Negative for depression. The patient does not have insomnia. Objective:     Visit Vitals  BP (!) 159/89 (BP 1 Location: Left upper arm, BP Patient Position: Sitting, BP Cuff Size: Adult)   Pulse (!) 104   Temp 97.5 °F (36.4 °C) (Temporal)   Resp 20   Ht 5' 4\" (1.626 m)   Wt 66.2 kg (146 lb)   SpO2 99%   BMI 25.06 kg/m²       ECOG Performance Status (grade): 1  0 - able to carry on all pre-disease activity w/out restriction  1 - restricted but able to carry out light work  2 - ambulatory and can self- care but unable to carry out work  3 - bed or chair >50% of waking hours  4 - completely disable, total care, confined to bed or chair    Physical Exam  Constitutional:       Appearance: Normal appearance. HENT:      Head: Normocephalic and atraumatic. Eyes:      Pupils: Pupils are equal, round, and reactive to light. Neck:      Musculoskeletal: Neck supple. Cardiovascular:      Rate and Rhythm: Normal rate and regular rhythm. Heart sounds: Normal heart sounds. Pulmonary:      Effort: Pulmonary effort is normal.      Breath sounds: Normal breath sounds. Abdominal:      General: Bowel sounds are normal.      Palpations: Abdomen is soft. Tenderness: There is no abdominal tenderness. There is no guarding. Musculoskeletal: Normal range of motion. Right lower leg: No edema. Left lower leg: No edema. Skin:     General: Skin is warm. Neurological:      General: No focal deficit present. Mental Status: She is alert and oriented to person, place, and time. Mental status is at baseline. Diagnostics:      No results found for this or any previous visit (from the past 96 hour(s)). Imaging:  No results found for this or any previous visit. Results for orders placed during the hospital encounter of 04/02/19   XR CHEST PA LAT    Narrative Chest  PA and lateral views    INDICATION:  Cough for 2 months    COMPARISON:  Chest x-ray 1/21/2011    FINDINGS:  The cardiac silhouette is normal in size. Atherosclerosis noted. Mild  reticular markings at the peripheral mid to lower lungs, possibly scarring. No  focal consolidation, pleural effusion, or pneumothorax. Lungs are hyperinflated. No acute osseous abnormalities are identified. Impression IMPRESSION:  Nothing clearly acute. Probable mild subpleural scarring at the mid to lower lungs. Hyperinflation, possibly indicative of COPD. Results for orders placed in visit on 01/28/21   CT LOW DOSE LUNG CANCER SCREENING    Narrative EXAM:  CT Chest without Contrast - Low Dose Screening CT. CLINICAL INDICATION:     - F17.200 (ICD-10-CM) - Tobacco dependence syndrome  - Screening.  - Meets the criteria, i.e. 61year-old, 46 pack-year smoking history (46 yrs x 1  ppd). COMPARISON:  None. TECHNIQUE:  Low dose unenhanced multislice helical CT is performed from the  thoracic inlet to the adrenal glands without intravenous contrast  administration.   Contiguous axial images were reconstructed and lung and soft  tissue windows were generated. Coronal and sagittal reformations were also  generated     Dose reduction techniques are used, including automated exposure control,  adjustment of the mAs and/or kVp according to patient size, standardized  low-dose protocol, and/or iterative reconstruction technique. FINDINGS:    Technique Assessment:  The overall quality of the study is adequate for  diagnostic review. Chest     - No distinct mass or suspicious lung nodule is detected bilaterally. - Right apical pleuroparenchymal scarring.  - No airspace opacities. - No significant interstitial fibrosis. - No significant pleural effusion.  - No mediastinal adenopathy. Miscellaneous:    - Base of Neck:  No acute abnormalities. No adenopathy. - Axillae:  No adenopathy.  - Esophagus:  Grossly unremarkable. - Upper Abdomen:  No acute abnormalities. - Skeletal Structures:  Grossly unremarkable. Impression No lung nodule identified. Lung-RADS Category:  1. Negative. Management recommendation:  Low dose screening CT in 12 months. Pathology          Assessment:                                        1. Iron deficiency anemia, unspecified iron deficiency anemia type    2. Normocytic anemia    3. Chronic anemia        Plan:                                        # Normocytic Anemia  # Iron deficiency anemia  # Chronic anemia   -- The patient has a past medical history significant of gastritis, mild duodenal deformity, and iron deficiency anemia. -- Lab reviews indicated chronic anemia since at least 1/20/2011 hemoglobin 10.4.  3/1/2021 CBC reported hemoglobin 10.8, hematocrit 34.2%, MCV 91.2, total WBC 5.6, and platelet 187,544.  + 1/7/2021 iron saturation was 12%, TIBC 361.  + 10/30/2020 ferritin was low at 15.  -- The patient could not tolerate iron pills. -- Today I have reviewed with the patient about the diagnosis and natural history of the disease.    Given her low ferritin level/Iron sat and she could not tolerate Iron pills, the preferred route of Iron replacement could be IV Iron. I have explained to the patient the potential risks and benefits of IV Iron, and alternative options. The patient was agreeable with the plan. Plan:  -- We will set up IV Injectafer x 2, 1 week apart. -- We will repeat Iron profiles and ferritin. We also obtain workup to rule out concomitant normocytic anemia: B12/Folate/ MMA/Copper, ret count, LDH/Hapto, and SPEP/SFLC. Further workup will be guided by results from aforementioned initial study. -- The patient will f/u with her GI if any further EGD/Colonoscopy indicated. -- I will see the patient back in clinic in about 3 months. Always sooner if required. The patient can have lab done prior to our next clinic visit.       Orders Placed This Encounter    METABOLIC PANEL, COMPREHENSIVE     Standing Status:   Future     Standing Expiration Date:   3/9/2022    IRON PROFILE     Standing Status:   Future     Standing Expiration Date:   3/9/2022    FERRITIN     Standing Status:   Future     Standing Expiration Date:   3/8/2022    VITAMIN B12 & FOLATE     Standing Status:   Future     Standing Expiration Date:   3/8/2022    RETICULOCYTE COUNT     Standing Status:   Future     Standing Expiration Date:   3/8/2022    HAPTOGLOBIN     Standing Status:   Future     Standing Expiration Date:   3/8/2022    COPPER     Standing Status:   Future     Standing Expiration Date:   3/8/2022    LD     Standing Status:   Future     Standing Expiration Date:   3/8/2022    METHYLMALONIC ACID     Standing Status:   Future     Standing Expiration Date:   3/8/2022    GAMMOPATHY EVAL, SPEP/ANTONIO, IG QT/FLC     Standing Status:   Future     Standing Expiration Date:   3/8/2022    CBC WITH AUTOMATED DIFF     Standing Status:   Future     Standing Expiration Date:   3/9/2022    FERRITIN     Standing Status:   Future     Standing Expiration Date:   3/9/2022   [facility] IRON PROFILE     Standing Status:   Future     Standing Expiration Date:   3/9/2022    RETICULOCYTE COUNT     Standing Status:   Future     Standing Expiration Date:   3/9/2022    atorvastatin (LIPITOR) 20 mg tablet     Sig: TAKE 1 TABLET BY MOUTH EVERY DAY    butalbital-acetaminophen-caffeine (FIORICET, ESGIC) -40 mg per tablet     Sig: TAKE 2 TABLETS ORALLY 3 TIMES A DAY FOR CHRONIC HEADACHES.  busPIRone (BUSPAR) 30 mg tablet     Sig: TAKE 1 TABLET BY MOUTH TWICE A DAY    venlafaxine-SR (EFFEXOR-XR) 75 mg capsule     Sig: TAKE 3 CAPSULES BY MOUTH EVERY DAY           Ms. Caron Santillan has a reminder for a \"due or due soon\" health maintenance. I have asked that she contact her primary care provider for follow-up on this health maintenance. All of patient's questions answered to their apparent satisfaction. They verbally show understanding and agreement with aforementioned plan. I would like to thank Dr Will Mendoza MD  for allowing me to participate in the care of this very pleasant patient. If I can be of further assistance please do not hesitate to call. Bailee Brown MD  3/8/2021          About 45 minutes were spent for this encounter with more than 50% of the time spent in face-to-face counseling, discussing on diagnosis and management plan going forward, and co-ordination of care. Parts of this document has been produced using Dragon dictation system. Unrecognized errors in transcription may be present. Please do not hesitate to reach out for any questions or clarifications.       CC: Will Mendoza MD

## 2021-03-09 LAB — HAPTOGLOB SERPL-MCNC: 162 MG/DL (ref 30–200)

## 2021-03-11 PROBLEM — D50.9 IRON DEFICIENCY ANEMIA: Status: ACTIVE | Noted: 2021-03-11

## 2021-03-11 LAB — COPPER SERPL-MCNC: 101 UG/DL (ref 80–158)

## 2021-03-11 RX ORDER — EPINEPHRINE 1 MG/ML
0.3 INJECTION, SOLUTION, CONCENTRATE INTRAVENOUS AS NEEDED
Status: CANCELLED | OUTPATIENT
Start: 2021-03-18

## 2021-03-11 RX ORDER — SODIUM CHLORIDE 9 MG/ML
10 INJECTION INTRAMUSCULAR; INTRAVENOUS; SUBCUTANEOUS AS NEEDED
Status: CANCELLED | OUTPATIENT
Start: 2021-03-18

## 2021-03-11 RX ORDER — ONDANSETRON 2 MG/ML
8 INJECTION INTRAMUSCULAR; INTRAVENOUS AS NEEDED
Status: CANCELLED | OUTPATIENT
Start: 2021-03-18

## 2021-03-11 RX ORDER — ACETAMINOPHEN 325 MG/1
650 TABLET ORAL AS NEEDED
Status: CANCELLED
Start: 2021-03-18

## 2021-03-11 RX ORDER — HYDROCORTISONE SODIUM SUCCINATE 100 MG/2ML
100 INJECTION, POWDER, FOR SOLUTION INTRAMUSCULAR; INTRAVENOUS AS NEEDED
Status: CANCELLED | OUTPATIENT
Start: 2021-03-18

## 2021-03-11 RX ORDER — ALBUTEROL SULFATE 0.83 MG/ML
2.5 SOLUTION RESPIRATORY (INHALATION) AS NEEDED
Status: CANCELLED
Start: 2021-03-18

## 2021-03-11 RX ORDER — DIPHENHYDRAMINE HYDROCHLORIDE 50 MG/ML
50 INJECTION, SOLUTION INTRAMUSCULAR; INTRAVENOUS AS NEEDED
Status: CANCELLED
Start: 2021-03-18

## 2021-03-11 RX ORDER — HEPARIN 100 UNIT/ML
300-500 SYRINGE INTRAVENOUS AS NEEDED
Status: CANCELLED
Start: 2021-03-18

## 2021-03-11 RX ORDER — DIPHENHYDRAMINE HYDROCHLORIDE 50 MG/ML
25 INJECTION, SOLUTION INTRAMUSCULAR; INTRAVENOUS AS NEEDED
Status: CANCELLED
Start: 2021-03-18

## 2021-03-12 LAB
ALBUMIN SERPL ELPH-MCNC: 3.8 G/DL (ref 2.9–4.4)
ALBUMIN/GLOB SERPL: 1.5 {RATIO} (ref 0.7–1.7)
ALPHA1 GLOB SERPL ELPH-MCNC: 0.2 G/DL (ref 0–0.4)
ALPHA2 GLOB SERPL ELPH-MCNC: 0.7 G/DL (ref 0.4–1)
B-GLOBULIN SERPL ELPH-MCNC: 0.9 G/DL (ref 0.7–1.3)
GAMMA GLOB SERPL ELPH-MCNC: 0.8 G/DL (ref 0.4–1.8)
GLOBULIN SER-MCNC: 2.6 G/DL (ref 2.2–3.9)
IGA SERPL-MCNC: 71 MG/DL (ref 87–352)
IGG SERPL-MCNC: 823 MG/DL (ref 586–1602)
IGM SERPL-MCNC: 179 MG/DL (ref 26–217)
INTERPRETATION SERPL IEP-IMP: ABNORMAL
KAPPA LC FREE SER-MCNC: 18.1 MG/L (ref 3.3–19.4)
KAPPA LC FREE/LAMBDA FREE SER: 0.83 {RATIO} (ref 0.26–1.65)
LAMBDA LC FREE SERPL-MCNC: 21.7 MG/L (ref 5.7–26.3)
M PROTEIN SERPL ELPH-MCNC: ABNORMAL G/DL
PROT SERPL-MCNC: 6.4 G/DL (ref 6–8.5)

## 2021-03-15 LAB
Lab: NORMAL
METHYLMALONATE SERPL-SCNC: 175 NMOL/L (ref 0–378)

## 2021-03-18 ENCOUNTER — HOSPITAL ENCOUNTER (OUTPATIENT)
Dept: INFUSION THERAPY | Age: 61
Discharge: HOME | End: 2021-03-18
Payer: COMMERCIAL

## 2021-03-18 VITALS
TEMPERATURE: 98.1 F | HEART RATE: 84 BPM | RESPIRATION RATE: 18 BRPM | OXYGEN SATURATION: 100 % | DIASTOLIC BLOOD PRESSURE: 81 MMHG | SYSTOLIC BLOOD PRESSURE: 121 MMHG

## 2021-03-18 DIAGNOSIS — D50.8 OTHER IRON DEFICIENCY ANEMIA: Primary | ICD-10-CM

## 2021-03-18 PROCEDURE — 96365 THER/PROPH/DIAG IV INF INIT: CPT

## 2021-03-18 PROCEDURE — 74011250636 HC RX REV CODE- 250/636: Performed by: INTERNAL MEDICINE

## 2021-03-18 RX ORDER — ONDANSETRON 2 MG/ML
8 INJECTION INTRAMUSCULAR; INTRAVENOUS AS NEEDED
Status: CANCELLED | OUTPATIENT
Start: 2021-03-25

## 2021-03-18 RX ORDER — ACETAMINOPHEN 325 MG/1
650 TABLET ORAL AS NEEDED
Status: CANCELLED
Start: 2021-03-25

## 2021-03-18 RX ORDER — SODIUM CHLORIDE 9 MG/ML
25 INJECTION, SOLUTION INTRAVENOUS CONTINUOUS
Status: DISCONTINUED | OUTPATIENT
Start: 2021-03-18 | End: 2021-03-19 | Stop reason: HOSPADM

## 2021-03-18 RX ORDER — SODIUM CHLORIDE 9 MG/ML
10 INJECTION INTRAMUSCULAR; INTRAVENOUS; SUBCUTANEOUS AS NEEDED
Status: CANCELLED | OUTPATIENT
Start: 2021-03-25

## 2021-03-18 RX ORDER — EPINEPHRINE 1 MG/ML
0.3 INJECTION, SOLUTION, CONCENTRATE INTRAVENOUS AS NEEDED
Status: CANCELLED | OUTPATIENT
Start: 2021-03-25

## 2021-03-18 RX ORDER — SODIUM CHLORIDE 9 MG/ML
25 INJECTION, SOLUTION INTRAVENOUS CONTINUOUS
Status: CANCELLED | OUTPATIENT
Start: 2021-03-25

## 2021-03-18 RX ORDER — HYDROCORTISONE SODIUM SUCCINATE 100 MG/2ML
100 INJECTION, POWDER, FOR SOLUTION INTRAMUSCULAR; INTRAVENOUS AS NEEDED
Status: CANCELLED | OUTPATIENT
Start: 2021-03-25

## 2021-03-18 RX ORDER — DIPHENHYDRAMINE HYDROCHLORIDE 50 MG/ML
25 INJECTION, SOLUTION INTRAMUSCULAR; INTRAVENOUS AS NEEDED
Status: CANCELLED
Start: 2021-03-25

## 2021-03-18 RX ORDER — DIPHENHYDRAMINE HYDROCHLORIDE 50 MG/ML
50 INJECTION, SOLUTION INTRAMUSCULAR; INTRAVENOUS AS NEEDED
Status: CANCELLED
Start: 2021-03-25

## 2021-03-18 RX ORDER — HEPARIN 100 UNIT/ML
300-500 SYRINGE INTRAVENOUS AS NEEDED
Status: CANCELLED
Start: 2021-03-25

## 2021-03-18 RX ORDER — SODIUM CHLORIDE 0.9 % (FLUSH) 0.9 %
10 SYRINGE (ML) INJECTION AS NEEDED
Status: DISCONTINUED | OUTPATIENT
Start: 2021-03-18 | End: 2021-03-19 | Stop reason: HOSPADM

## 2021-03-18 RX ORDER — SODIUM CHLORIDE 0.9 % (FLUSH) 0.9 %
10 SYRINGE (ML) INJECTION AS NEEDED
Status: CANCELLED | OUTPATIENT
Start: 2021-03-25

## 2021-03-18 RX ORDER — ALBUTEROL SULFATE 0.83 MG/ML
2.5 SOLUTION RESPIRATORY (INHALATION) AS NEEDED
Status: CANCELLED
Start: 2021-03-25

## 2021-03-18 RX ADMIN — Medication 10 ML: at 14:27

## 2021-03-18 RX ADMIN — SODIUM CHLORIDE 25 ML/HR: 9 INJECTION, SOLUTION INTRAVENOUS at 14:27

## 2021-03-18 RX ADMIN — FERRIC CARBOXYMALTOSE INJECTION 750 MG: 50 INJECTION, SOLUTION INTRAVENOUS at 14:27

## 2021-03-25 ENCOUNTER — HOSPITAL ENCOUNTER (OUTPATIENT)
Dept: INFUSION THERAPY | Age: 61
Discharge: HOME | End: 2021-03-25
Payer: COMMERCIAL

## 2021-03-25 VITALS
OXYGEN SATURATION: 100 % | HEART RATE: 85 BPM | RESPIRATION RATE: 16 BRPM | SYSTOLIC BLOOD PRESSURE: 158 MMHG | TEMPERATURE: 98.8 F | DIASTOLIC BLOOD PRESSURE: 79 MMHG

## 2021-03-25 DIAGNOSIS — D50.8 OTHER IRON DEFICIENCY ANEMIA: Primary | ICD-10-CM

## 2021-03-25 PROCEDURE — 74011250636 HC RX REV CODE- 250/636: Performed by: INTERNAL MEDICINE

## 2021-03-25 PROCEDURE — 96365 THER/PROPH/DIAG IV INF INIT: CPT

## 2021-03-25 RX ORDER — SODIUM CHLORIDE 9 MG/ML
25 INJECTION, SOLUTION INTRAVENOUS CONTINUOUS
Status: DISCONTINUED | OUTPATIENT
Start: 2021-03-25 | End: 2021-03-26 | Stop reason: HOSPADM

## 2021-03-25 RX ORDER — DIPHENHYDRAMINE HYDROCHLORIDE 50 MG/ML
50 INJECTION, SOLUTION INTRAMUSCULAR; INTRAVENOUS AS NEEDED
Status: CANCELLED
Start: 2021-03-25

## 2021-03-25 RX ORDER — EPINEPHRINE 1 MG/ML
0.3 INJECTION, SOLUTION, CONCENTRATE INTRAVENOUS AS NEEDED
Status: CANCELLED | OUTPATIENT
Start: 2021-03-25

## 2021-03-25 RX ORDER — HYDROCORTISONE SODIUM SUCCINATE 100 MG/2ML
100 INJECTION, POWDER, FOR SOLUTION INTRAMUSCULAR; INTRAVENOUS AS NEEDED
Status: CANCELLED | OUTPATIENT
Start: 2021-03-25

## 2021-03-25 RX ORDER — ACETAMINOPHEN 325 MG/1
650 TABLET ORAL AS NEEDED
Status: CANCELLED
Start: 2021-03-25

## 2021-03-25 RX ORDER — SODIUM CHLORIDE 0.9 % (FLUSH) 0.9 %
10 SYRINGE (ML) INJECTION AS NEEDED
Status: DISCONTINUED | OUTPATIENT
Start: 2021-03-25 | End: 2021-03-26 | Stop reason: HOSPADM

## 2021-03-25 RX ORDER — SODIUM CHLORIDE 9 MG/ML
10 INJECTION INTRAMUSCULAR; INTRAVENOUS; SUBCUTANEOUS AS NEEDED
Status: CANCELLED | OUTPATIENT
Start: 2021-03-25

## 2021-03-25 RX ORDER — ONDANSETRON 2 MG/ML
8 INJECTION INTRAMUSCULAR; INTRAVENOUS AS NEEDED
Status: CANCELLED | OUTPATIENT
Start: 2021-03-25

## 2021-03-25 RX ORDER — HEPARIN 100 UNIT/ML
300-500 SYRINGE INTRAVENOUS AS NEEDED
Status: CANCELLED
Start: 2021-03-25

## 2021-03-25 RX ORDER — SODIUM CHLORIDE 9 MG/ML
25 INJECTION, SOLUTION INTRAVENOUS CONTINUOUS
Status: CANCELLED | OUTPATIENT
Start: 2021-03-25

## 2021-03-25 RX ORDER — SODIUM CHLORIDE 0.9 % (FLUSH) 0.9 %
10 SYRINGE (ML) INJECTION AS NEEDED
Status: CANCELLED | OUTPATIENT
Start: 2021-03-25

## 2021-03-25 RX ORDER — DIPHENHYDRAMINE HYDROCHLORIDE 50 MG/ML
25 INJECTION, SOLUTION INTRAMUSCULAR; INTRAVENOUS AS NEEDED
Status: CANCELLED
Start: 2021-03-25

## 2021-03-25 RX ORDER — ALBUTEROL SULFATE 0.83 MG/ML
2.5 SOLUTION RESPIRATORY (INHALATION) AS NEEDED
Status: CANCELLED
Start: 2021-03-25

## 2021-03-25 RX ADMIN — Medication 10 ML: at 14:49

## 2021-03-25 RX ADMIN — FERRIC CARBOXYMALTOSE INJECTION 750 MG: 50 INJECTION, SOLUTION INTRAVENOUS at 14:18

## 2021-03-25 NOTE — PROGRESS NOTES
[facility] Progress Note Date: 2021 Name: Delta Pratt MRN: [id number] : 1960 WEEKLY INJECTAFER, DOSE 2 OF 2 
 
 
Ms. Joie Elias arrived to [facility] at [id number]. Ms. Joie Elias was assessed and education was provided. Ms. Singh's vitals were reviewed. Visit Vitals BP (!) 167/85 (BP 1 Location: Left upper arm, BP Patient Position: Sitting) Pulse 100 Temp 98.8 °F (37.1 °C) Resp 18 SpO2 97% 24g IV inserted in patient's Right AC x1 attempt. Brisk blood return/ flushes without difficulty. Injectafer 750mg in 250ml NS administered as ordered followed by NS flush. Ms. Joie Elias tolerated well without complaints. VSS. No s/sx of adverse reaction. Discharge/ follow-up instructions discussed w/ pt. Pt verbalized understanding. IV removed/ intact. Gauze/ coban to site. Pt armband removed & shredded. Ms. Joie Elias was discharged from [place] [id number] in stable condition at 1530. She is f/u with Dr. Morgan Fernandez after Injectafer infusions. Hailey Evans RN 
2021

## 2021-05-07 ENCOUNTER — HOSPITAL ENCOUNTER (OUTPATIENT)
Dept: LAB | Age: 61
Discharge: HOME | End: 2021-05-07
Payer: COMMERCIAL

## 2021-05-07 ENCOUNTER — APPOINTMENT (OUTPATIENT)
Dept: INTERNAL MEDICINE CLINIC | Age: 61
End: 2021-05-07

## 2021-05-07 DIAGNOSIS — E61.1 IRON DEFICIENCY: ICD-10-CM

## 2021-05-07 DIAGNOSIS — E78.5 HYPERLIPIDEMIA, UNSPECIFIED HYPERLIPIDEMIA TYPE: ICD-10-CM

## 2021-05-07 LAB
ALBUMIN SERPL-MCNC: 3.9 G/DL (ref 3.4–5)
ALBUMIN/GLOB SERPL: 1.3 {RATIO} (ref 0.8–1.7)
ALP SERPL-CCNC: 106 U/L (ref 45–117)
ALT SERPL-CCNC: 26 U/L (ref 13–56)
ANION GAP SERPL CALC-SCNC: 6 MMOL/L (ref 3–18)
AST SERPL-CCNC: 27 U/L (ref 10–38)
BILIRUB SERPL-MCNC: 0.3 MG/DL (ref 0.2–1)
BUN SERPL-MCNC: 13 MG/DL (ref 7–18)
BUN/CREAT SERPL: 19 (ref 12–20)
CALCIUM SERPL-MCNC: 8.6 MG/DL (ref 8.5–10.1)
CHLORIDE SERPL-SCNC: 108 MMOL/L (ref 100–111)
CHOLEST SERPL-MCNC: 243 MG/DL
CO2 SERPL-SCNC: 26 MMOL/L (ref 21–32)
CREAT SERPL-MCNC: 0.69 MG/DL (ref 0.6–1.3)
ERYTHROCYTE [DISTWIDTH] IN BLOOD BY AUTOMATED COUNT: 16.1 % (ref 11.6–14.5)
GLOBULIN SER CALC-MCNC: 3 G/DL (ref 2–4)
GLUCOSE SERPL-MCNC: 86 MG/DL (ref 74–99)
HCT VFR BLD AUTO: 38.5 % (ref 35–45)
HDLC SERPL-MCNC: 70 MG/DL (ref 40–60)
HDLC SERPL: 3.5 {RATIO} (ref 0–5)
HGB BLD-MCNC: 12.4 G/DL (ref 12–16)
LDLC SERPL CALC-MCNC: 141.4 MG/DL (ref 0–100)
LIPID PROFILE,FLP: ABNORMAL
MCH RBC QN AUTO: 32 PG (ref 24–34)
MCHC RBC AUTO-ENTMCNC: 32.2 G/DL (ref 31–37)
MCV RBC AUTO: 99.2 FL (ref 74–97)
PLATELET # BLD AUTO: 260 K/UL (ref 135–420)
PMV BLD AUTO: 10.1 FL (ref 9.2–11.8)
POTASSIUM SERPL-SCNC: 4.2 MMOL/L (ref 3.5–5.5)
PROT SERPL-MCNC: 6.9 G/DL (ref 6.4–8.2)
RBC # BLD AUTO: 3.88 M/UL (ref 4.2–5.3)
SODIUM SERPL-SCNC: 140 MMOL/L (ref 136–145)
TRIGL SERPL-MCNC: 158 MG/DL (ref ?–150)
VLDLC SERPL CALC-MCNC: 31.6 MG/DL
WBC # BLD AUTO: 5.5 K/UL (ref 4.6–13.2)

## 2021-05-07 PROCEDURE — 80061 LIPID PANEL: CPT

## 2021-05-07 PROCEDURE — 80053 COMPREHEN METABOLIC PANEL: CPT

## 2021-05-07 PROCEDURE — 36415 COLL VENOUS BLD VENIPUNCTURE: CPT

## 2021-05-07 PROCEDURE — 85027 COMPLETE CBC AUTOMATED: CPT

## 2021-05-09 NOTE — PROGRESS NOTES
61 y.o. WHITE female who presents for evaluation    She continues to go to Dr Dennis Montiel for pain mgmt. She saw Dr Horacio Ochoa and got iron infusion w good results as below    We inc the lipitor to 20 last visit and no sfx to report. Her diet has not changed, same activity levels, not missing doses, no frothy urine, last tsh ok 6/20    No  or gyn complaints. Her bp has been controlled and no cardiovascular complaints     Not able to stop smoking, declined meds previously. No wheezing or sob    Past Medical History:   Diagnosis Date    Acne     Dr. Christine Zuñiga Allergic rhinitis     Dr. Keyla Garcia    Anemia, iron deficiency 06/2020    Dr Chavo Howard; s/p iron infusion Dr Daina Sanchez; has not tried other ssris    Chronic migraine without aura     failed beta blocker, depakote, topamax per pt    Chronic pain     Dr Dennis Montiel    Degeneration of cervical intervertebral disc     Degenerative arthritis of lumbar spine     Dr Dennis Montiel mri 2011 showed degen changes L1-L2 and L3-L4, mild to mod bilat foraminal narrowing    Fibromyalgia syndrome     Ganglion cyst     GERD (gastroesophageal reflux disease)     Hyperlipidemia     calculated 10 year risk score 4.6% (9/15); 4.9% (9/16); 7.1% (1/19); 9% (6/20); 11% (9/20)    Hypertension 04/02/2019    Hypovitaminosis D     PUD (peptic ulcer disease)     Dr. Bessie Brown, duodenal w gi bleed (2011);  Dr Chavo Howard pyloric ulcer (7/2/20)    Spinal stenosis in cervical region     Tension headache, chronic     uses fioricet    Tobacco dependence syndrome     declined meds; able to quit on her own but not ready due to stressors; low dose CT neg (1/21)     Past Surgical History:   Procedure Laterality Date    COLONOSCOPY N/A 7/2/2020    Dr Bessie Brown 3/28/11 neg; Dr Chavo Howard 7/2/20 neg    HX BREAST BIOPSY Left 3/5/2021    LEFT BREAST EXCISIONAL  BIOPSY performed by Mac Gleason MD at [address] HX ENDOSCOPY      Dr Chavo Howard 10/30/20 hp neg   Bonnie Babb; 7 hand surgeries   Melanie Finn foot surgery    HX ORTHOPAEDIC  2000s    h/o pelvis fracture    HX OTHER SURGICAL      s/p lipoma resecton     Social History     Socioeconomic History    Marital status:      Spouse name: Not on file    Number of children: 1    Years of education: Not on file    Highest education level: Not on file   Occupational History    Occupation: owns [employer]   Social Needs    Financial resource strain: Not on file    Food insecurity     Worry: Not on file     Inability: Not on file   [employer] needs     Medical: Not on file     Non-medical: Not on file   Tobacco Use    Smoking status: Current Every Day Smoker     Packs/day: 0.50     Years: 37.00     Pack years: 18.50     Types: Cigarettes    Smokeless tobacco: Never Used   Substance and Sexual Activity    Alcohol use: No    Drug use: Never    Sexual activity: Not on file   Lifestyle    Physical activity     Days per week: Not on file     Minutes per session: Not on file    Stress: Not on file   Relationships    Social connections     Talks on phone: Not on file     Gets together: Not on file     Attends Congregational service: Not on file     Active member of club or organization: Not on file     Attends meetings of clubs or organizations: Not on file     Relationship status: Not on file    Intimate partner violence     Fear of current or ex partner: Not on file     Emotionally abused: Not on file     Physically abused: Not on file     Forced sexual activity: Not on file   Other Topics Concern    Not on file   Social History Narrative    Not on file     Current Outpatient Medications   Medication Sig    atorvastatin (LIPITOR) 40 mg tablet Take 1 Tab by mouth daily.  amLODIPine (NORVASC) 5 mg tablet TAKE 1 TABLET BY MOUTH EVERY DAY    butalbital-acetaminophen-caffeine (FIORICET, ESGIC) -40 mg per tablet TAKE 2 TABLETS ORALLY 3 TIMES A DAY FOR CHRONIC HEADACHES.     busPIRone (BUSPAR) 30 mg tablet TAKE 1 TABLET BY MOUTH TWICE A DAY    venlafaxine-SR (EFFEXOR-XR) 75 mg capsule TAKE 3 CAPSULES BY MOUTH EVERY DAY    cetirizine HCl (ZYRTEC PO) Take  by mouth as needed.  LORazepam (Ativan) 1 mg tablet Take 1 mg by mouth two (2) times daily as needed for Anxiety.  cholecalciferol (VITAMIN D3) (2,000 UNITS /50 MCG) cap capsule Take  by mouth daily.  HYDROcodone-acetaminophen (NORCO)  mg tablet Take 1 Tab by mouth every six (6) hours as needed for Pain. Indications: pain    pantoprazole (PROTONIX) 40 mg tablet Take 1 Tab by mouth daily.  buPROPion XL (Wellbutrin XL) 300 mg XL tablet Take 300 mg by mouth nightly.  SUMAtriptan (IMITREX) 100 mg tablet Take 1 Tab by mouth once as needed for Migraine for up to 1 dose. Indications: Migraine    cyclobenzaprine (FLEXERIL) 10 mg tablet TAKE 1 TABLET BY MOUTH THREE TIMES DAILY AS NEEDED FOR MUSCLE SPASM(S)--- 90 day supply  Indications: MUSCLE SPASM    naloxone 4 mg/actuation spry 4 mg by Nasal route as needed for up to 2 doses. Indications: OPIOID TOXICITY     No current facility-administered medications for this visit. Allergies   Allergen Reactions    Aspirin Other (comments)    Nsaids (Non-Steroidal Anti-Inflammatory Drug) Other (comments)     Pt has had bleeding ulcers     REVIEW OF SYSTEMS: gyn Dr Carl Cooper 2016?, mammo 1/21, colo 2012 Dr Beryle Crape no vision change or eye pain  Oral  no mouth pain, tongue or tooth problems  Ears  no hearing loss, ear pain, fullness, no swallowing problems  Cardiac  no CP, PND, orthopnea, edema, palpitations or syncope  Chest  no breast masses  Resp  no wheezing, chronic coughing, dyspnea  Urinary  no dysuria, hematuria, flank pain, urgency, frequency    Visit Vitals  /80   Pulse (!) 109   Temp 97.7 °F (36.5 °C) (Temporal)   Resp 14   Ht 5' 4\" (1.626 m)   Wt 145 lb (65.8 kg)   SpO2 99%   BMI 24.89 kg/m²     A&O x3  Affect is appropriate.   Mood stable  No apparent distress  Anicteric, no JVD, adenopathy or thyromegaly. Mouth ulcers evident bilaterally  No carotid bruits or radiated murmur  Lungs clear to auscultation, no wheezes or rales  Heart showed regular rate and rhythm. No murmur, rubs, gallops  Abdomen soft nontender, no hepatosplenomegaly or masses. Extremities without edema.   Pulses 1-2+ symmetrically    LABS   From 2/10 showed gluc 88, cr 0.80,             alt 26, chol 255, tg 98,    hdl 68, ldl-c 168, wbc 4.5, hb 13.4, plt 233, tsh 0.92, ua neg  From 1/11 showed gluc 94, cr 0.60, gfr>60,            chol 137, tg 123, hdl 29, ldl-c 83,                       ck/trop neg  From 6/13 showed gluc 95, cr 0.70, gfr>60, alt 17, chol 232, tg 170, hdl 62, ldl-c 136,        ua neg, vit d 23.4  From 9/15 showed gluc 91, cr 0.96, gfr 60,  alt<5,  chol 255, tg 103, hdl 76, ldl-c 158, wbc 6.8, hb 13.2, plt 271,     ua neg, vit d 23.0  From 9/16 showed gluc 87, cr 0.73, gfr 92,  alt 13, chol 254, tg 122, hdl 84, ldl-c 146, wbc 5.6, hb 13.0, plt 270, tsh 1.76, ua neg,          hep c-  From 9/17 showed gluc 86, cr 0.76, gfr>60, alt 15, chol 263, tg 145, hdl 92, ldl-c 142, wbc 5.2, hb 12.2, plt 277,     ua neg, vit d 99.4  From 1/19 showed glu 101, cr 0.88, gfr>60, alt 12, chol 283, tg 183, hdl 76, ldl-c 162,          vit d 25.7  From 6/20 showed gluc 92, cr 0.98, gfr 58,  alt 17, chol 248, tg 135, hdl 79, ldl-c 142, wbc 4.7, hb 6.5,   plt 338, tsh 1.72,     fe 8,         %sat 2,   ferritin 3, b12 263, fol 13.9, spep neg, iris neg  From 7/20 showed               wbc 4.5, hb 8.0,   plt 336,       fe 15,       %sat 3,   ferritin 4  From 9/20 showed               chol 275, tg 113, hdl 82, ldl-c 170, wbc 4.7, hb 11.4, plt 238, hba1c 4.5,     fe 91,       %sat 29,   From 1/21 showed gluc 85, cr 0.75, gfr>60, alt 16, chol 247, tg 126, hdl 94, ldl-c 128, wbc 4.5, hb 11.9, plt 331,       fe 42,       %sat 12  From 3/21 showed                   fe 29,       %sat 8,   ferritin 5, b12 329, fol 4.1,   hapto 162, ldh 152    Results for orders placed or performed during the hospital encounter of 05/07/21   CBC W/O DIFF   Result Value Ref Range    WBC 5.5 4.6 - 13.2 K/uL    RBC 3.88 (L) 4.20 - 5.30 M/uL    HGB 12.4 12.0 - 16.0 g/dL    HCT 38.5 35.0 - 45.0 %    MCV 99.2 (H) 74.0 - 97.0 FL    MCH 32.0 24.0 - 34.0 PG    MCHC 32.2 31.0 - 37.0 g/dL    RDW 16.1 (H) 11.6 - 14.5 %    PLATELET 741 291 - 211 K/uL    MPV 10.1 9.2 - 11.8 FL   LIPID PANEL   Result Value Ref Range    LIPID PROFILE          Cholesterol, total 243 (H) <200 MG/DL    Triglyceride 158 (H) <150 MG/DL    HDL Cholesterol 70 (H) 40 - 60 MG/DL    LDL, calculated 141.4 (H) 0 - 100 MG/DL    VLDL, calculated 31.6 MG/DL    CHOL/HDL Ratio 3.5 0 - 5.0     METABOLIC PANEL, COMPREHENSIVE   Result Value Ref Range    Sodium 140 136 - 145 mmol/L    Potassium 4.2 3.5 - 5.5 mmol/L    Chloride 108 100 - 111 mmol/L    CO2 26 21 - 32 mmol/L    Anion gap 6 3.0 - 18 mmol/L    Glucose 86 74 - 99 mg/dL    BUN 13 7.0 - 18 MG/DL    Creatinine 0.69 0.6 - 1.3 MG/DL    BUN/Creatinine ratio 19 12 - 20      GFR est AA >60 >60 ml/min/1.73m2    GFR est non-AA >60 >60 ml/min/1.73m2    Calcium 8.6 8.5 - 10.1 MG/DL    Bilirubin, total 0.3 0.2 - 1.0 MG/DL    ALT (SGPT) 26 13 - 56 U/L    AST (SGOT) 27 10 - 38 U/L    Alk.  phosphatase 106 45 - 117 U/L    Protein, total 6.9 6.4 - 8.2 g/dL    Albumin 3.9 3.4 - 5.0 g/dL    Globulin 3.0 2.0 - 4.0 g/dL    A-G Ratio 1.3 0.8 - 1.7       We reviewed the patient's labs from the last several visits to point out trends in the numbers          Patient Active Problem List   Diagnosis Code    Hyperlipidemia E78.5    Hypovitaminosis D E55.9    Chronic pain syndrome G89.4    Degeneration of lumbar or lumbosacral intervertebral disc M51.37    Polyarthralgia M25.50    Generalized OA M15.9    Tobacco dependence syndrome F17.200    Peptic ulcer disease K27.9    Anxiety F41.9    Primary hypertension I10    Left breast mass N63.20    Iron deficiency anemia D50.9     Assessment and plan:  1. Allergic rhinitis. Prn meds  2. Chronic pain syndrome. F/U Dr Dario Serrano   3. Anxiety. Continue current  4. Hyperlipidemia. Inc lipitor to 40; check thyroid and ua next visit  5. H/O PUD and GERD. PPI and avoidance measures indefinitely  6. General.  F/U gyn, mammo   7. Smoking cessation meds declined. Yearly screening  8. Hypovit d. Supplementation  9. Anemia. Per Dr [facility]        RTC 9/21    Above conditions discussed at length and patient vocalized understanding.   All questions answered to patient satisfaction

## 2021-05-14 ENCOUNTER — OFFICE VISIT (OUTPATIENT)
Dept: INTERNAL MEDICINE CLINIC | Age: 61
End: 2021-05-14
Payer: COMMERCIAL

## 2021-05-14 VITALS
WEIGHT: 145 LBS | HEART RATE: 109 BPM | HEIGHT: 64 IN | OXYGEN SATURATION: 99 % | TEMPERATURE: 97.7 F | BODY MASS INDEX: 24.75 KG/M2 | SYSTOLIC BLOOD PRESSURE: 120 MMHG | DIASTOLIC BLOOD PRESSURE: 80 MMHG | RESPIRATION RATE: 14 BRPM

## 2021-05-14 DIAGNOSIS — D50.8 OTHER IRON DEFICIENCY ANEMIA: ICD-10-CM

## 2021-05-14 DIAGNOSIS — I10 PRIMARY HYPERTENSION: ICD-10-CM

## 2021-05-14 DIAGNOSIS — G89.4 CHRONIC PAIN SYNDROME: ICD-10-CM

## 2021-05-14 DIAGNOSIS — F41.9 ANXIETY: ICD-10-CM

## 2021-05-14 DIAGNOSIS — E78.5 HYPERLIPIDEMIA, UNSPECIFIED HYPERLIPIDEMIA TYPE: Primary | ICD-10-CM

## 2021-05-14 PROCEDURE — 99214 OFFICE O/P EST MOD 30 MIN: CPT | Performed by: INTERNAL MEDICINE

## 2021-05-14 RX ORDER — ATORVASTATIN CALCIUM 40 MG/1
40 TABLET, FILM COATED ORAL DAILY
Qty: 90 TAB | Refills: 3 | Status: SHIPPED | OUTPATIENT
Start: 2021-05-14 | End: 2022-05-09

## 2021-05-25 ENCOUNTER — HOSPITAL ENCOUNTER (OUTPATIENT)
Dept: LAB | Age: 61
Discharge: HOME | End: 2021-05-25
Payer: COMMERCIAL

## 2021-05-25 ENCOUNTER — LAB ONLY (OUTPATIENT)
Dept: ONCOLOGY | Age: 61
End: 2021-05-25

## 2021-05-25 DIAGNOSIS — D50.9 IRON DEFICIENCY ANEMIA, UNSPECIFIED IRON DEFICIENCY ANEMIA TYPE: Primary | ICD-10-CM

## 2021-05-25 DIAGNOSIS — D50.9 IRON DEFICIENCY ANEMIA, UNSPECIFIED IRON DEFICIENCY ANEMIA TYPE: ICD-10-CM

## 2021-05-25 LAB
ALBUMIN SERPL-MCNC: 4.1 G/DL (ref 3.4–5)
ALBUMIN/GLOB SERPL: 1.4 {RATIO} (ref 0.8–1.7)
ALP SERPL-CCNC: 90 U/L (ref 45–117)
ALT SERPL-CCNC: 18 U/L (ref 13–56)
ANION GAP SERPL CALC-SCNC: 4 MMOL/L (ref 3–18)
AST SERPL-CCNC: 13 U/L (ref 10–38)
BASOPHILS # BLD: 0 K/UL (ref 0–0.1)
BASOPHILS NFR BLD: 0 % (ref 0–2)
BILIRUB SERPL-MCNC: 0.3 MG/DL (ref 0.2–1)
BUN SERPL-MCNC: 22 MG/DL (ref 7–18)
BUN/CREAT SERPL: 18 (ref 12–20)
CALCIUM SERPL-MCNC: 8.9 MG/DL (ref 8.5–10.1)
CHLORIDE SERPL-SCNC: 104 MMOL/L (ref 100–111)
CO2 SERPL-SCNC: 28 MMOL/L (ref 21–32)
CREAT SERPL-MCNC: 1.25 MG/DL (ref 0.6–1.3)
DIFFERENTIAL METHOD BLD: ABNORMAL
EOSINOPHIL # BLD: 0.1 K/UL (ref 0–0.4)
EOSINOPHIL NFR BLD: 1 % (ref 0–5)
ERYTHROCYTE [DISTWIDTH] IN BLOOD BY AUTOMATED COUNT: 14.6 % (ref 11.6–14.5)
FERRITIN SERPL-MCNC: 62 NG/ML (ref 8–388)
GLOBULIN SER CALC-MCNC: 2.9 G/DL (ref 2–4)
GLUCOSE SERPL-MCNC: 89 MG/DL (ref 74–99)
HCT VFR BLD AUTO: 37.7 % (ref 35–45)
HGB BLD-MCNC: 12.3 G/DL (ref 12–16)
IRON SATN MFR SERPL: 26 % (ref 20–50)
IRON SERPL-MCNC: 78 UG/DL (ref 50–175)
LYMPHOCYTES # BLD: 1.6 K/UL (ref 0.9–3.6)
LYMPHOCYTES NFR BLD: 25 % (ref 21–52)
MCH RBC QN AUTO: 32.2 PG (ref 24–34)
MCHC RBC AUTO-ENTMCNC: 32.6 G/DL (ref 31–37)
MCV RBC AUTO: 98.7 FL (ref 74–97)
MONOCYTES # BLD: 0.5 K/UL (ref 0.05–1.2)
MONOCYTES NFR BLD: 8 % (ref 3–10)
NEUTS SEG # BLD: 4.1 K/UL (ref 1.8–8)
NEUTS SEG NFR BLD: 65 % (ref 40–73)
PLATELET # BLD AUTO: 274 K/UL (ref 135–420)
PMV BLD AUTO: 9.7 FL (ref 9.2–11.8)
POTASSIUM SERPL-SCNC: 4.2 MMOL/L (ref 3.5–5.5)
PROT SERPL-MCNC: 7 G/DL (ref 6.4–8.2)
RBC # BLD AUTO: 3.82 M/UL (ref 4.2–5.3)
SODIUM SERPL-SCNC: 136 MMOL/L (ref 136–145)
TIBC SERPL-MCNC: 297 UG/DL (ref 250–450)
WBC # BLD AUTO: 6.4 K/UL (ref 4.6–13.2)

## 2021-05-25 PROCEDURE — 36415 COLL VENOUS BLD VENIPUNCTURE: CPT

## 2021-05-25 PROCEDURE — 80053 COMPREHEN METABOLIC PANEL: CPT

## 2021-05-25 PROCEDURE — 82728 ASSAY OF FERRITIN: CPT

## 2021-05-25 PROCEDURE — 85025 COMPLETE CBC W/AUTO DIFF WBC: CPT

## 2021-05-25 PROCEDURE — 83540 ASSAY OF IRON: CPT

## 2021-06-05 NOTE — PROGRESS NOTES
Hematology/Oncology Note      Date: 2021    Name: Erika De La Cruz  : 1960        Rhianna Wright MD         Subjective:     Chief complaint: Iron deficiency anemia    History of Present Illness:   Ms. Shannon Bedoya is a most pleasant 61y.o. year old female who was seen initially for consultation of iron deficiency anemia. The patient has a past medical history significant of gastritis, mild duodenal deformity, and iron deficiency anemia. Lab reviews indicated chronic anemia since at least 2011 hemoglobin 10.4.  3/1/2021 CBC reported hemoglobin 10.8, hematocrit 34.2%, total WBC 5.6, and platelet 318,422.  6974 iron saturation was 12%, TIBC 361.  10/30/2020 ferritin was low at 15. The patient reported she could not tolerate iron pills as it made her sick, nausea, and being tired. She reported her chronic fatigue and low energy level have been improving since IV Iron. The patient otherwise has no other complaints. Denied fever, chills, night sweat, unintentional weight loss, skin lumps or bumps, acute bleeding or bruising issues. No acute bleeding, blood in stool, dark stool, melena, hematochezia, hemoptysis, dark urine, or easily bruising. Denied headache, acute vision change, dizziness, chest pain, worsen shortness of breath, palpitation, productive cough, nausea, vomiting, abdominal pain, altered bowel habits, dysuria, worsen bone pain or back pain, new focal numbness or weakness. Oncologic History:  Oncology History    No history exists.        Past Medical History, Family History, and Social History:    Past Medical History:   Diagnosis Date    Acne     Dr. Henny Mohr Allergic rhinitis     Dr. Jaqui Castrejon    Anemia, iron deficiency 2020    Dr Gay Li; s/p iron infusion Dr Christina Gonzalez; has not tried other ssris    Chronic migraine without aura     failed beta blocker, depakote, topamax per pt    Chronic pain     Dr Jadiel Sanders    Degeneration of cervical intervertebral disc  Degenerative arthritis of lumbar spine     Dr Rayshawn Nath mri 2011 showed degen changes L1-L2 and L3-L4, mild to mod bilat foraminal narrowing    Fibromyalgia syndrome     Ganglion cyst     GERD (gastroesophageal reflux disease)     Hyperlipidemia     calculated 10 year risk score 4.6% (9/15); 4.9% (9/16); 7.1% (1/19); 9% (6/20); 11% (9/20)    Hypertension 04/02/2019    Hypovitaminosis D     PUD (peptic ulcer disease)     Dr. Sofya Delgado, duodenal w gi bleed (2011);  Dr Elif Juares pyloric ulcer (7/2/20)    Spinal stenosis in cervical region     Tension headache, chronic     uses fioricet    Tobacco dependence syndrome     declined meds; able to quit on her own but not ready due to stressors; low dose CT neg (1/21)     Past Surgical History:   Procedure Laterality Date    COLONOSCOPY N/A 7/2/2020    Dr Sofya Delgado 3/28/11 neg; Dr Elif Juares 7/2/20 neg    HX BREAST BIOPSY Left 3/5/2021    LEFT BREAST EXCISIONAL  BIOPSY performed by Richard Hirsch MD at [facility] MAIN Billie Juares 10/30/20 hp neg   Teretha Fuse      Dr Phong Toledo; 7 hand surgeries   [facility]    Dr. Shahla Leong foot surgery    HX ORTHOPAEDIC  2000s    h/o pelvis fracture    HX OTHER SURGICAL      s/p lipoma resecton     Social History     Socioeconomic History    Marital status:      Spouse name: Not on file    Number of children: 1    Years of education: Not on file    Highest education level: Not on file   Occupational History    Occupation: owns [employer]   Tobacco Use    Smoking status: Current Every Day Smoker     Packs/day: 0.50     Years: 37.00     Pack years: 18.50     Types: Cigarettes    Smokeless tobacco: Never Used   Vaping Use    Vaping Use: Never used   Substance and Sexual Activity    Alcohol use: No    Drug use: Never    Sexual activity: Not on file   Other Topics Concern    Not on file   Social History Narrative    Not on file     Social Determinants of Health     Financial Resource Strain:     Difficulty of Paying Living Expenses:    Food Insecurity:     Worried About Running Out of Food in the Last Year:     [address] in the Last Year:    Transportation Needs:     Lack of Transportation (Medical):  Lack of Transportation (Non-Medical):    Physical Activity:     Days of Exercise per Week:     Minutes of Exercise per Session:    Stress:     Feeling of Stress :    Social Connections:     Frequency of Communication with Friends and Family:     Frequency of Social Gatherings with Friends and Family:     Attends [employer] Services:     Active Member of Clubs or Organizations:     Attends Club or Organization Meetings:     Marital Status:    Intimate Partner Violence:     Fear of Current or Ex-Partner:     Emotionally Abused:     Physically Abused:     Sexually Abused:      Family History   Problem Relation Age of Onset    Stroke Father      Current Outpatient Medications   Medication Sig Dispense Refill    atorvastatin (LIPITOR) 40 mg tablet Take 1 Tab by mouth daily. 90 Tab 3    amLODIPine (NORVASC) 5 mg tablet TAKE 1 TABLET BY MOUTH EVERY DAY 90 Tab 3    butalbital-acetaminophen-caffeine (FIORICET, ESGIC) -40 mg per tablet TAKE 2 TABLETS ORALLY 3 TIMES A DAY FOR CHRONIC HEADACHES.  busPIRone (BUSPAR) 30 mg tablet TAKE 1 TABLET BY MOUTH TWICE A DAY      venlafaxine-SR (EFFEXOR-XR) 75 mg capsule TAKE 3 CAPSULES BY MOUTH EVERY DAY      cetirizine HCl (ZYRTEC PO) Take  by mouth as needed.  LORazepam (Ativan) 1 mg tablet Take 1 mg by mouth two (2) times daily as needed for Anxiety.  cholecalciferol (VITAMIN D3) (2,000 UNITS /50 MCG) cap capsule Take  by mouth daily.  HYDROcodone-acetaminophen (NORCO)  mg tablet Take 1 Tab by mouth every six (6) hours as needed for Pain. Indications: pain      pantoprazole (PROTONIX) 40 mg tablet Take 1 Tab by mouth daily.  180 Tab 3    buPROPion XL (Wellbutrin XL) 300 mg XL tablet Take 300 mg by mouth nightly.  SUMAtriptan (IMITREX) 100 mg tablet Take 1 Tab by mouth once as needed for Migraine for up to 1 dose. Indications: Migraine 9 Tab 0    cyclobenzaprine (FLEXERIL) 10 mg tablet TAKE 1 TABLET BY MOUTH THREE TIMES DAILY AS NEEDED FOR MUSCLE SPASM(S)--- 90 day supply  Indications: MUSCLE SPASM 270 Tab 3    naloxone 4 mg/actuation spry 4 mg by Nasal route as needed for up to 2 doses. Indications: OPIOID TOXICITY 1 Box 1       Review of Systems   Constitutional: Negative for chills, diaphoresis, fever, malaise/fatigue and weight loss. Respiratory: Negative for cough, hemoptysis, shortness of breath and wheezing. Cardiovascular: Negative for chest pain, palpitations and leg swelling. Gastrointestinal: Negative for abdominal pain, diarrhea, heartburn, nausea and vomiting. Genitourinary: Negative for dysuria, frequency, hematuria and urgency. Musculoskeletal: Negative for joint pain and myalgias. Skin: Negative for itching and rash. Neurological: Negative for dizziness, seizures, weakness and headaches. Psychiatric/Behavioral: Negative for depression. The patient does not have insomnia. Objective:     Visit Vitals  /85 (BP Patient Position: Sitting)   Pulse (!) 105   Temp 98.4 °F (36.9 °C) (Temporal)   Resp 16   Wt 65.3 kg (144 lb)   SpO2 98%   BMI 24.72 kg/m²       ECOG Performance Status (grade): 0  0 - able to carry on all pre-disease activity w/out restriction  1 - restricted but able to carry out light work  2 - ambulatory and can self- care but unable to carry out work  3 - bed or chair >50% of waking hours  4 - completely disable, total care, confined to bed or chair    Physical Exam  Constitutional:       Appearance: Normal appearance. HENT:      Head: Normocephalic and atraumatic. Eyes:      Pupils: Pupils are equal, round, and reactive to light. Cardiovascular:      Rate and Rhythm: Normal rate and regular rhythm.       Heart sounds: Normal heart sounds. Pulmonary:      Effort: Pulmonary effort is normal.      Breath sounds: Normal breath sounds. Abdominal:      General: Bowel sounds are normal.      Palpations: Abdomen is soft. Tenderness: There is no abdominal tenderness. There is no guarding. Musculoskeletal:         General: Normal range of motion. Cervical back: Neck supple. Right lower leg: No edema. Left lower leg: No edema. Skin:     General: Skin is warm. Neurological:      General: No focal deficit present. Mental Status: She is alert and oriented to person, place, and time. Mental status is at baseline. Diagnostics:      No results found for this or any previous visit (from the past 96 hour(s)). Imaging:  No results found for this or any previous visit. Results for orders placed during the hospital encounter of 04/02/19    XR CHEST PA LAT    Narrative  Chest  PA and lateral views    INDICATION:  Cough for 2 months    COMPARISON:  Chest x-ray 1/21/2011    FINDINGS:  The cardiac silhouette is normal in size. Atherosclerosis noted. Mild  reticular markings at the peripheral mid to lower lungs, possibly scarring. No  focal consolidation, pleural effusion, or pneumothorax. Lungs are hyperinflated. No acute osseous abnormalities are identified. Impression  IMPRESSION:  Nothing clearly acute. Probable mild subpleural scarring at the mid to lower lungs. Hyperinflation, possibly indicative of COPD. Results for orders placed in visit on 01/28/21    CT LOW DOSE LUNG CANCER SCREENING    Narrative  EXAM:  CT Chest without Contrast - Low Dose Screening CT. CLINICAL INDICATION:    - F17.200 (ICD-10-CM) - Tobacco dependence syndrome  - Screening.  - Meets the criteria, i.e. 61year-old, 46 pack-year smoking history (46 yrs x 1  ppd). COMPARISON:  None.     TECHNIQUE:  Low dose unenhanced multislice helical CT is performed from the  thoracic inlet to the adrenal glands without intravenous contrast  administration. Contiguous axial images were reconstructed and lung and soft  tissue windows were generated. Coronal and sagittal reformations were also  generated    Dose reduction techniques are used, including automated exposure control,  adjustment of the mAs and/or kVp according to patient size, standardized  low-dose protocol, and/or iterative reconstruction technique. FINDINGS:    Technique Assessment:  The overall quality of the study is adequate for  diagnostic review. Chest    - No distinct mass or suspicious lung nodule is detected bilaterally. - Right apical pleuroparenchymal scarring.  - No airspace opacities. - No significant interstitial fibrosis. - No significant pleural effusion.  - No mediastinal adenopathy. Miscellaneous:    - Base of Neck:  No acute abnormalities. No adenopathy. - Axillae:  No adenopathy.  - Esophagus:  Grossly unremarkable. - Upper Abdomen:  No acute abnormalities. - Skeletal Structures:  Grossly unremarkable. Impression  No lung nodule identified. Lung-RADS Category:  1. Negative. Management recommendation:  Low dose screening CT in 12 months. Pathology          Assessment:                                        1. Iron deficiency anemia, unspecified iron deficiency anemia type    2. Normocytic anemia        Plan:                                      # Iron deficiency anemia  # Normocytic Anemia  -- The patient has a past medical history significant of gastritis, mild duodenal deformity, and iron deficiency anemia. -- Lab reviews indicated chronic anemia since at least 1/20/2011 hemoglobin 10.4.  3/1/2021 CBC reported hemoglobin 10.8, hematocrit 34.2%, MCV 91.2, total WBC 5.6, and platelet 708,997.  + 1/7/2021 iron saturation was 12%, TIBC 361.  + 10/30/2020 ferritin was low at 15.  -- 3/8/2021 SPEP/ANTONIO/SFLC reported the immunofixation pattern appears unremarkable. Evidence of   monoclonal protein is not apparent.   -- The patient could not tolerate iron pills. She was arranged for IV Injectafer. .  -- 3/25/2021 s.p IV Injectafer x 2, 1 week apart. Tolerated it well. -- Today I have reviewed with the patient about recent lab reports. 5/25/2021 CBC reported hemoglobin 12.3, hematocrit 37.7%, MCV 98.7, WBC 6.4, platelet 397,488. Iron profile showed saturation 26% and ferritin improving to 62. Plan:  -- We will continue to monitor CBC, Iron profiles and ferritin periodically. -- The patient will f/u with her GI if any further EGD/Colonoscopy indicated. -- We will see the patient back in clinic in about 4 months. Always sooner if required. The patient can have lab done prior to our next clinic visit. No orders of the defined types were placed in this encounter. Ms. Loretta Campoverde has a reminder for a \"due or due soon\" health maintenance. I have asked that she contact her primary care provider for follow-up on this health maintenance. All of patient's questions answered to their apparent satisfaction. They verbally show understanding and agreement with aforementioned plan. Aldair Irvin MD  6/8/2021          About 30 minutes were spent for this encounter with more than 50% of the time spent in face-to-face counseling, discussing on diagnosis and management plan going forward, and co-ordination of care. Parts of this document has been produced using Dragon dictation system. Unrecognized errors in transcription may be present. Please do not hesitate to reach out for any questions or clarifications.       CC: Naren Shaw MD

## 2021-06-08 ENCOUNTER — OFFICE VISIT (OUTPATIENT)
Dept: ONCOLOGY | Age: 61
End: 2021-06-08
Payer: COMMERCIAL

## 2021-06-08 VITALS
OXYGEN SATURATION: 98 % | RESPIRATION RATE: 16 BRPM | DIASTOLIC BLOOD PRESSURE: 85 MMHG | SYSTOLIC BLOOD PRESSURE: 127 MMHG | BODY MASS INDEX: 24.72 KG/M2 | HEART RATE: 105 BPM | WEIGHT: 144 LBS | TEMPERATURE: 98.4 F

## 2021-06-08 DIAGNOSIS — D64.9 NORMOCYTIC ANEMIA: ICD-10-CM

## 2021-06-08 DIAGNOSIS — D50.9 IRON DEFICIENCY ANEMIA, UNSPECIFIED IRON DEFICIENCY ANEMIA TYPE: Primary | ICD-10-CM

## 2021-06-08 PROCEDURE — 99214 OFFICE O/P EST MOD 30 MIN: CPT | Performed by: INTERNAL MEDICINE

## 2021-06-11 ENCOUNTER — TELEPHONE (OUTPATIENT)
Dept: INTERNAL MEDICINE CLINIC | Age: 61
End: 2021-06-11

## 2021-06-11 RX ORDER — CHOLESTYRAMINE 4 G/4.8G
4 POWDER, FOR SUSPENSION ORAL 2 TIMES DAILY
Qty: 60 EACH | Refills: 5 | Status: SHIPPED | OUTPATIENT
Start: 2021-06-11 | End: 2021-09-15

## 2021-06-11 NOTE — TELEPHONE ENCOUNTER
Patient calling to discuss having frequent diarrhea. She said this has been an issue in the past when she took iron pills. She has been receiving iron infusions and thinks that this is contributing to the increase of diarrhea episodes. She says she is not getting much sleep at night due to waking up and having had a bowel movement of diarrhea that she does not even realize. She said her body gives her no warning this is going to happen so she is not able to get to the bathroom. Increased episodes have been on-going consistently x a few weeks. Prior to that she was just having an episode here and there. She is taking imodium to be able to work- she says she has to take multiple outfits to work due to again having bowel movements and not realizing it. She said she has seen GI in the past and had work up. She is trying to see if  has any further recommendations. She said she can not keep going on like this.

## 2021-06-11 NOTE — TELEPHONE ENCOUNTER
Spoke with patient she states she has not tried fiber to firm up stool. Patient states she would also like to have questran sent to the pharmacy just incase the fiber does not help.

## 2021-06-11 NOTE — TELEPHONE ENCOUNTER
Iron infusion doesn't generally cause diarrhea as it goes straight into blood stream and not the gut  Has she tried firming up the stool with fiber?     We can try binding agent like Molly López as well

## 2021-06-29 ENCOUNTER — TELEPHONE (OUTPATIENT)
Dept: INTERNAL MEDICINE CLINIC | Age: 61
End: 2021-06-29

## 2021-07-01 ENCOUNTER — VIRTUAL VISIT (OUTPATIENT)
Dept: INTERNAL MEDICINE CLINIC | Age: 61
End: 2021-07-01
Payer: COMMERCIAL

## 2021-07-01 DIAGNOSIS — J01.00 ACUTE NON-RECURRENT MAXILLARY SINUSITIS: ICD-10-CM
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
## 2021-07-01 PROCEDURE — 99214 OFFICE O/P EST MOD 30 MIN: CPT | Performed by: INTERNAL MEDICINE

## 2021-07-01 RX ORDER — AMOXICILLIN AND CLAVULANATE POTASSIUM 875; 125 MG/1; MG/1
1 TABLET, FILM COATED ORAL 2 TIMES DAILY
Qty: 20 TABLET | Refills: 0 | Status: SHIPPED | OUTPATIENT
Start: 2021-07-01 | End: 2021-07-11

## 2021-07-01 NOTE — PROGRESS NOTES
Bárbara Garcia is a 61 y.o. female who was seen by synchronous (real-time) audio-video technology on 7/1/2021 for No chief complaint on file. Assessment & Plan:   Diagnoses and all orders for this visit:    1. Dog bite, initial encounter  -     amoxicillin-clavulanate (AUGMENTIN) 875-125 mg per tablet; Take 1 Tablet by mouth two (2) times a day for 10 days. 2. Acute non-recurrent maxillary sinusitis  -     amoxicillin-clavulanate (AUGMENTIN) 875-125 mg per tablet; Take 1 Tablet by mouth two (2) times a day for 10 days. I spent at least 15 minutes on this visit with this established patient. 712  Subjective:     Objective:   No flowsheet data found. General: alert, cooperative, no distress   Mental  status: normal mood, behavior, speech, dress, motor activity, and thought processes, able to follow commands   HENT: NCAT   Neck: no visualized mass   Resp: no respiratory distress   Neuro: no gross deficits   Skin: no discoloration or lesions of concern on visible areas   Psychiatric: normal affect, consistent with stated mood, no evidence of hallucinations     Additional exam findings: We discussed the expected course, resolution and complications of the diagnosis(es) in detail. Medication risks, benefits, costs, interactions, and alternatives were discussed as indicated. I advised her to contact the office if her condition worsens, changes or fails to improve as anticipated. She expressed understanding with the diagnosis(es) and plan. Bárbara Garcia, was evaluated through a synchronous (real-time) audio-video encounter. The patient (or guardian if applicable) is aware that this is a billable service. Verbal consent to proceed has been obtained within the past 12 months. The visit was conducted pursuant to the emergency declaration under the [facility], [address] [place] [address] authority and the [employer] General Act. Patient identification was verified, and a caregiver was present when appropriate. The patient was located in a state where the provider was credentialed to provide care. Anthony Daniel MD    61 y.o. WHITE/NON- female who presents for evaluation. The last month or so, her allergies have been flaring but over the last 2 weeks, she thinks it's more of a sinus infection. Describes left ear fullness/pressure along with left facial pain. She has been getting occasionally bloody discolored drainage from the left side mainly. No fevers, hearing loss, sore throat, productive cough, minimal postnasal drip. 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 She was able to get control of the bleeding using superglue; her tetanus is up-to-date, she just wanted to know if she needs to be on antibiotics. The dog was up-to-date on rabies shots, she is thinking of pursuing legal matters with the owner    Past Medical History:   Diagnosis Date    Acne     Dr. Roshni Aleman Allergic rhinitis     Dr. Betty Stein    Anemia, iron deficiency 06/2020    Dr Ian Sellers; s/p iron infusion Dr Herbie Lombard; has not tried other ssris    Chronic migraine without aura     failed beta blocker, depakote, topamax per pt    Chronic pain     Dr Von Fitch    Degeneration of cervical intervertebral disc     Degenerative arthritis of lumbar spine     Dr Eric Solorio 2011 showed degen changes L1-L2 and L3-L4, mild to mod bilat foraminal narrowing    Fibromyalgia syndrome     Ganglion cyst     GERD (gastroesophageal reflux disease)     Hyperlipidemia     calculated 10 year risk score 4.6% (9/15); 4.9% (9/16); 7.1% (1/19); 9% (6/20); 11% (9/20)    Hypertension 04/02/2019    Hypovitaminosis D     PUD (peptic ulcer disease)     Dr. Michelle Álvarez, duodenal w gi bleed (2011);  Dr Ian Sellers pyloric ulcer (7/2/20)    Spinal stenosis in cervical region     Tension headache, chronic     uses fioricet    Tobacco dependence syndrome     declined meds; able to quit on her own but not ready due to stressors; low dose CT neg (1/21)     Social History     Socioeconomic History    Marital status:      Spouse name: Not on file    Number of children: 1    Years of education: Not on file    Highest education level: Not on file   Occupational History    Occupation: owns [employer]   Tobacco Use    Smoking status: Current Every Day Smoker     Packs/day: 0.50     Years: 37.00     Pack years: 18.50     Types: Cigarettes    Smokeless tobacco: Never Used   Vaping Use    Vaping Use: Never used   Substance and Sexual Activity    Alcohol use: No    Drug use: Never    Sexual activity: Not on file   Other Topics Concern    Not on file   Social History Narrative    Not on file     Social Determinants of Health     Financial Resource Strain:     Difficulty of Paying Living Expenses:    Food Insecurity:     Worried About [address] in the Last Year:     [address] [employer] in the Last Year:    Transportation Needs:     Lack of Transportation (Medical):  Lack of Transportation (Non-Medical):    Physical Activity:     Days of Exercise per Week:     Minutes of Exercise per Session:    Stress:     Feeling of Stress :    Social Connections:     Frequency of Communication with Friends and Family:     Frequency of Social Gatherings with Friends and Family:     Attends Zoroastrian Services:     Active Member of Clubs or Organizations:     Attends Club or Organization Meetings:     Marital Status:    Intimate Partner Violence:     Fear of Current or Ex-Partner:     Emotionally Abused:     Physically Abused:     Sexually Abused:      Current Outpatient Medications   Medication Sig    amoxicillin-clavulanate (AUGMENTIN) 875-125 mg per tablet Take 1 Tablet by mouth two (2) times a day for 10 days.     cholestyramine-aspartame (QUESTRAN LIGHT) 4 gram packet Take 1 Packet by mouth two (2) times a day.  atorvastatin (LIPITOR) 40 mg tablet Take 1 Tab by mouth daily.  amLODIPine (NORVASC) 5 mg tablet TAKE 1 TABLET BY MOUTH EVERY DAY    butalbital-acetaminophen-caffeine (FIORICET, ESGIC) -40 mg per tablet TAKE 2 TABLETS ORALLY 3 TIMES A DAY FOR CHRONIC HEADACHES.  busPIRone (BUSPAR) 30 mg tablet TAKE 1 TABLET BY MOUTH TWICE A DAY    venlafaxine-SR (EFFEXOR-XR) 75 mg capsule TAKE 3 CAPSULES BY MOUTH EVERY DAY    cetirizine HCl (ZYRTEC PO) Take  by mouth as needed.  LORazepam (Ativan) 1 mg tablet Take 1 mg by mouth two (2) times daily as needed for Anxiety.  cholecalciferol (VITAMIN D3) (2,000 UNITS /50 MCG) cap capsule Take  by mouth daily.  HYDROcodone-acetaminophen (NORCO)  mg tablet Take 1 Tab by mouth every six (6) hours as needed for Pain. Indications: pain    pantoprazole (PROTONIX) 40 mg tablet Take 1 Tab by mouth daily.  buPROPion XL (Wellbutrin XL) 300 mg XL tablet Take 300 mg by mouth nightly.  SUMAtriptan (IMITREX) 100 mg tablet Take 1 Tab by mouth once as needed for Migraine for up to 1 dose. Indications: Migraine    cyclobenzaprine (FLEXERIL) 10 mg tablet TAKE 1 TABLET BY MOUTH THREE TIMES DAILY AS NEEDED FOR MUSCLE SPASM(S)--- 90 day supply  Indications: MUSCLE SPASM    naloxone 4 mg/actuation spry 4 mg by Nasal route as needed for up to 2 doses. Indications: OPIOID TOXICITY     No current facility-administered medications for this visit.      Immunization History   Administered Date(s) Administered    COVID-19, PFIZER, MRNA, LNP-S, PF, 30MCG/0.3ML DOSE 03/24/2021, 04/14/2021    Influenza Vaccine 01/21/2013, 10/29/2019    Influenza Vaccine (Quad) PF (>6 Mo Flulaval, Fluarix, and >3 Yrs Afluria, Fluzone [id number]) 09/08/2017, 10/29/2019, 09/10/2020    Influenza Vaccine Whole 11/05/2009    Pneumococcal Polysaccharide (PPSV-23) 09/20/2016    TD Vaccine 06/14/2006    Tdap 02/06/2015    Zoster Recombinant 10/14/2020, 03/10/2021    Zoster Vaccine, Live 05/02/2013     Assessment and plan:  1. Sinusitis. Augmentin given, continue OTC meds for symptom relief  2. Dog bite. Tetanus up-to-date, she is not worried about cosmetic appearance and will just let it heal on its own. Augmentin should cover as well. Call if worsening symptoms        Above conditions discussed at length and patient vocalized understanding.   All questions answered to patient satisfaction

## 2021-07-30 ENCOUNTER — TELEPHONE (OUTPATIENT)
Dept: INTERNAL MEDICINE CLINIC | Age: 61
End: 2021-07-30

## 2021-07-30 DIAGNOSIS — L23.7 POISON IVY DERMATITIS: Primary | ICD-10-CM

## 2021-07-30 RX ORDER — PREDNISONE 20 MG/1
20 TABLET ORAL
Qty: 5 TABLET | Refills: 0 | Status: SHIPPED | OUTPATIENT
Start: 2021-07-30 | End: 2021-09-21

## 2021-07-30 NOTE — TELEPHONE ENCOUNTER
Patient says she is in blisters all over feet and ankles, poison ivy. She is requesting a prescription as soon as possible.

## 2021-08-18 ENCOUNTER — TELEPHONE (OUTPATIENT)
Dept: INTERNAL MEDICINE CLINIC | Age: 61
End: 2021-08-18

## 2021-08-18 DIAGNOSIS — R21 RASH: Primary | ICD-10-CM

## 2021-08-18 NOTE — TELEPHONE ENCOUNTER
Patient is requesting a referral to Dr. Roberto Blakely for very unusual scabs and bumps on her feet an lower legs.

## 2021-08-23 ENCOUNTER — PATIENT MESSAGE (OUTPATIENT)
Dept: INTERNAL MEDICINE CLINIC | Age: 61
End: 2021-08-23

## 2021-08-23 NOTE — TELEPHONE ENCOUNTER
All derms have a long wait usually  Would suggest she call around and see if anyone can see her sooner

## 2021-08-23 NOTE — TELEPHONE ENCOUNTER
From: Medina Barahona  To: Raymundo Parks MD  Sent: 8/23/2021 2:26 PM EDT  Subject: Referral Request    The dermatologist office [facility]) that you referred me to, about the [facility] sores on my feet, cannot get me in until September 28th. And not with Dr Yanez Prior. It's been 7 weeks already that this issue happened and you've already gave me a prednisone RX. It didn't help. Do I just wait another month for this Dermatology appt or any other options?  Thanks

## 2021-09-14 ENCOUNTER — APPOINTMENT (OUTPATIENT)
Dept: INTERNAL MEDICINE CLINIC | Age: 61
End: 2021-09-14

## 2021-09-14 ENCOUNTER — HOSPITAL ENCOUNTER (OUTPATIENT)
Dept: LAB | Age: 61
Discharge: HOME | End: 2021-09-14
Payer: COMMERCIAL

## 2021-09-14 DIAGNOSIS — E78.5 HYPERLIPIDEMIA, UNSPECIFIED HYPERLIPIDEMIA TYPE: ICD-10-CM

## 2021-09-14 LAB
ALT SERPL-CCNC: 20 U/L (ref 13–56)
APPEARANCE UR: CLEAR
BILIRUB UR QL: NEGATIVE
CHOLEST SERPL-MCNC: 179 MG/DL
COLOR UR: ABNORMAL
GLUCOSE UR STRIP.AUTO-MCNC: NEGATIVE MG/DL
HDLC SERPL-MCNC: 72 MG/DL (ref 40–60)
HDLC SERPL: 2.5 {RATIO} (ref 0–5)
HGB UR QL STRIP: NEGATIVE
KETONES UR QL STRIP.AUTO: ABNORMAL MG/DL
LDLC SERPL CALC-MCNC: 78.2 MG/DL (ref 0–100)
LEUKOCYTE ESTERASE UR QL STRIP.AUTO: NEGATIVE
LIPID PROFILE,FLP: ABNORMAL
NITRITE UR QL STRIP.AUTO: NEGATIVE
PH UR STRIP: 5.5 [PH] (ref 5–8)
PROT UR STRIP-MCNC: NEGATIVE MG/DL
SP GR UR REFRACTOMETRY: >1.03 (ref 1–1.03)
T4 FREE SERPL-MCNC: 0.9 NG/DL (ref 0.7–1.5)
TRIGL SERPL-MCNC: 144 MG/DL (ref ?–150)
TSH SERPL DL<=0.05 MIU/L-ACNC: 0.88 UIU/ML (ref 0.36–3.74)
UROBILINOGEN UR QL STRIP.AUTO: 1 EU/DL (ref 0.2–1)
VLDLC SERPL CALC-MCNC: 28.8 MG/DL

## 2021-09-14 PROCEDURE — 81003 URINALYSIS AUTO W/O SCOPE: CPT

## 2021-09-14 PROCEDURE — 84460 ALANINE AMINO (ALT) (SGPT): CPT

## 2021-09-14 PROCEDURE — 84443 ASSAY THYROID STIM HORMONE: CPT

## 2021-09-14 PROCEDURE — 36415 COLL VENOUS BLD VENIPUNCTURE: CPT

## 2021-09-14 PROCEDURE — 84439 ASSAY OF FREE THYROXINE: CPT

## 2021-09-14 PROCEDURE — 80061 LIPID PANEL: CPT

## 2021-09-15 RX ORDER — CHOLESTYRAMINE 4 G/5.5G
POWDER, FOR SUSPENSION ORAL
Qty: 231 PACKET | Refills: 1 | Status: SHIPPED | OUTPATIENT
Start: 2021-09-15 | End: 2022-09-20 | Stop reason: ALTCHOICE

## 2021-09-15 NOTE — PROGRESS NOTES
64 y.o. WHITE/NON- female who presents for evaluation    She continues to go to Dr Kameron Torres for pain mgmt. Still follow up with Dr Woodrow Ozuna and got iron infusion     We inc the lipitor to 40 mg and no issues. No  or gyn complaints. Her bp has been controlled and no cardiovascular complaints     Not able to stop smoking, declined meds previously. No wheezing or sob, LDCT neg 1/21    Past Medical History:   Diagnosis Date    Acne     Dr. Anton Mendiola Allergic rhinitis     Dr. Anthony Kidney    Anemia, iron deficiency 06/2020    Dr Gerardo Singh; s/p iron infusion Dr Yvonne Barthel; has not tried other ssris    Chronic migraine without aura     failed beta blocker, depakote, topamax per pt    Chronic pain     Dr Kameron Torres    Degeneration of cervical intervertebral disc     Degenerative arthritis of lumbar spine     Dr Kameron Torres mri 2011 showed degen changes L1-L2 and L3-L4, mild to mod bilat foraminal narrowing    Fibromyalgia syndrome     Ganglion cyst     GERD (gastroesophageal reflux disease)     Hyperlipidemia     calculated 10 year risk score 4.6% (9/15); 4.9% (9/16); 7.1% (1/19); 9% (6/20); 11% (9/20)    Hypertension 04/02/2019    Hypovitaminosis D     PUD (peptic ulcer disease)     Dr. Jay Grossman, duodenal w gi bleed (2011);  Dr Gerardo Singh pyloric ulcer (7/2/20)    Spinal stenosis in cervical region     Tension headache, chronic     uses fioricet    Tobacco dependence syndrome     declined meds; able to quit on her own but not ready due to stressors; low dose CT neg (1/21)     Past Surgical History:   Procedure Laterality Date    COLONOSCOPY N/A 7/2/2020    Dr Jay Grossman 3/28/11 neg; Dr Gerardo Singh 7/2/20 neg    HX BREAST BIOPSY Left 3/5/2021    LEFT BREAST EXCISIONAL  BIOPSY performed by [facility]c [facility] at [facility]      Dr Gerardo Singh 10/30/20 hp neg   Thomasenia Kehr Dr Heddie Caster; 7 hand surgeries   Allyson Green RIGHT foot surgery    HX ORTHOPAEDIC  2000s h/o pelvis fracture    HX OTHER SURGICAL      s/p lipoma resecton     Social History     Socioeconomic History    Marital status:      Spouse name: Not on file    Number of children: 1    Years of education: Not on file    Highest education level: Not on file   Occupational History    Occupation: owns [employer]   Tobacco Use    Smoking status: Current Every Day Smoker     Packs/day: 0.50     Years: 37.00     Pack years: 18.50     Types: Cigarettes    Smokeless tobacco: Never Used   Vaping Use    Vaping Use: Never used   Substance and Sexual Activity    Alcohol use: No    Drug use: Never    Sexual activity: Not on file   Other Topics Concern    Not on file   Social History Narrative    Not on file     Social Determinants of Health     Financial Resource Strain:     Difficulty of Paying Living Expenses:    Food Insecurity:     Worried About Running Out of Food in the Last Year:     Ran Out of Food in the Last Year:    Transportation Needs:     Lack of Transportation (Medical):  Lack of Transportation (Non-Medical):    Physical Activity:     Days of Exercise per Week:     Minutes of Exercise per Session:    Stress:     Feeling of Stress :    Social Connections:     Frequency of Communication with Friends and Family:     Frequency of Social Gatherings with Friends and Family:     Attends Druze Services:     Active Member of Clubs or Organizations:     Attends Club or Organization Meetings:     Marital Status:    Intimate Partner Violence:     Fear of Current or Ex-Partner:     Emotionally Abused:     Physically Abused:     Sexually Abused:      Current Outpatient Medications   Medication Sig    loratadine (Claritin) 10 mg tablet Take 10 mg by mouth.  Prevalite 4 gram packet TAKE 1 PACKET BY MOUTH TWO (2) TIMES A DAY.  atorvastatin (LIPITOR) 40 mg tablet Take 1 Tab by mouth daily.     amLODIPine (NORVASC) 5 mg tablet TAKE 1 TABLET BY MOUTH EVERY DAY    butalbital-acetaminophen-caffeine (FIORICET, ESGIC) -40 mg per tablet TAKE 2 TABLETS ORALLY 3 TIMES A DAY FOR CHRONIC HEADACHES.  busPIRone (BUSPAR) 30 mg tablet TAKE 1 TABLET BY MOUTH TWICE A DAY    venlafaxine-SR (EFFEXOR-XR) 75 mg capsule TAKE 3 CAPSULES BY MOUTH EVERY DAY    LORazepam (Ativan) 1 mg tablet Take 1 mg by mouth two (2) times daily as needed for Anxiety.  cholecalciferol (VITAMIN D3) (2,000 UNITS /50 MCG) cap capsule Take  by mouth daily.  HYDROcodone-acetaminophen (NORCO)  mg tablet Take 1 Tab by mouth every six (6) hours as needed for Pain. Indications: pain    pantoprazole (PROTONIX) 40 mg tablet Take 1 Tab by mouth daily.  buPROPion XL (Wellbutrin XL) 300 mg XL tablet Take 300 mg by mouth nightly.  SUMAtriptan (IMITREX) 100 mg tablet Take 1 Tab by mouth once as needed for Migraine for up to 1 dose. Indications: Migraine    cyclobenzaprine (FLEXERIL) 10 mg tablet TAKE 1 TABLET BY MOUTH THREE TIMES DAILY AS NEEDED FOR MUSCLE SPASM(S)--- 90 day supply  Indications: MUSCLE SPASM    naloxone 4 mg/actuation spry 4 mg by Nasal route as needed for up to 2 doses. Indications: OPIOID TOXICITY     No current facility-administered medications for this visit.      Allergies   Allergen Reactions    Aspirin Other (comments)    Nsaids (Non-Steroidal Anti-Inflammatory Drug) Other (comments)     Pt has had bleeding ulcers     REVIEW OF SYSTEMS: gyn Dr Yepez Pass 2016?, mammo 1/21, colo 2012 Dr Michelle Gilman no vision change or eye pain  Oral  no mouth pain, tongue or tooth problems  Ears  no hearing loss, ear pain, fullness, no swallowing problems  Cardiac  no CP, PND, orthopnea, edema, palpitations or syncope  Chest  no breast masses  Resp  no wheezing, chronic coughing, dyspnea  Urinary  no dysuria, hematuria, flank pain, urgency, frequency    Visit Vitals  /82   Pulse 100   Temp 97.7 °F (36.5 °C) (Temporal)   Resp 16   Ht 5' 4\" (1.626 m)   Wt 140 lb (63.5 kg) SpO2 97%   BMI 24.03 kg/m²     A&O x3  Affect is appropriate. Mood stable  No apparent distress  Anicteric, no JVD, adenopathy or thyromegaly. Mouth ulcers evident bilaterally  No carotid bruits or radiated murmur  Lungs clear to auscultation, no wheezes or rales  Heart showed regular rate and rhythm. No murmur, rubs, gallops  Abdomen soft nontender, no hepatosplenomegaly or masses. Extremities without edema.   Pulses 1-2+ symmetrically    LABS   From 2/10 showed gluc 88, cr 0.80,             alt 26, chol 255, tg 98,    hdl 68, ldl-c 168, wbc 4.5, hb 13.4, plt 233, tsh 0.92, ua neg  From 1/11 showed gluc 94, cr 0.60, gfr>60,            chol 137, tg 123, hdl 29, ldl-c 83,                       ck/trop neg  From 6/13 showed gluc 95, cr 0.70, gfr>60, alt 17, chol 232, tg 170, hdl 62, ldl-c 136,        ua neg, vit d 23.4  From 9/15 showed gluc 91, cr 0.96, gfr 60,  alt<5,  chol 255, tg 103, hdl 76, ldl-c 158, wbc 6.8, hb 13.2, plt 271,     ua neg, vit d 23.0  From 9/16 showed gluc 87, cr 0.73, gfr 92,  alt 13, chol 254, tg 122, hdl 84, ldl-c 146, wbc 5.6, hb 13.0, plt 270, tsh 1.76, ua neg,          hep c-  From 9/17 showed gluc 86, cr 0.76, gfr>60, alt 15, chol 263, tg 145, hdl 92, ldl-c 142, wbc 5.2, hb 12.2, plt 277,     ua neg, vit d 99.4  From 1/19 showed glu 101, cr 0.88, gfr>60, alt 12, chol 283, tg 183, hdl 76, ldl-c 162,          vit d 25.7  From 6/20 showed gluc 92, cr 0.98, gfr 58,  alt 17, chol 248, tg 135, hdl 79, ldl-c 142, wbc 4.7, hb 6.5,   plt 338, tsh 1.72,     fe 8,         %sat 2,   ferritin 3, b12 263, fol 13.9, spep neg, iris neg  From 7/20 showed               wbc 4.5, hb 8.0,   plt 336,       fe 15,       %sat 3,   ferritin 4  From 9/20 showed               chol 275, tg 113, hdl 82, ldl-c 170, wbc 4.7, hb 11.4, plt 238, hba1c 4.5,     fe 91,       %sat 29,   From 1/21 showed gluc 85, cr 0.75, gfr>60, alt 16, chol 247, tg 126, hdl 94, ldl-c 128, wbc 4.5, hb 11.9, plt 331,       fe 42, %sat 12  From 3/21 showed                   fe 29,       %sat 8,   ferritin 5, b12 329, fol 4.1,   hapto 162, ldh 152  From 5/21 showed gluc 86, cr 0.69, gfr>60, alt 26, chol 243, tg 158, hdl 70, ldl-c 141, wbc 5.5, hb 12.4, plt 260    Results for orders placed or performed during the hospital encounter of 09/14/21   LIPID PANEL   Result Value Ref Range    LIPID PROFILE          Cholesterol, total 179 <200 MG/DL    Triglyceride 144 <150 MG/DL    HDL Cholesterol 72 (H) 40 - 60 MG/DL    LDL, calculated 78.2 0 - 100 MG/DL    VLDL, calculated 28.8 MG/DL    CHOL/HDL Ratio 2.5 0 - 5.0     ALT   Result Value Ref Range    ALT (SGPT) 20 13 - 56 U/L   URINALYSIS W/ RFLX MICROSCOPIC   Result Value Ref Range    Color DARK YELLOW      Appearance CLEAR      Specific gravity >1.030 (H) 1.005 - 1.030    pH (UA) 5.5 5.0 - 8.0      Protein Negative NEG mg/dL    Glucose Negative NEG mg/dL    Ketone TRACE (A) NEG mg/dL    Bilirubin Negative NEG      Blood Negative NEG      Urobilinogen 1.0 0.2 - 1.0 EU/dL    Nitrites Negative NEG      Leukocyte Esterase Negative NEG     TSH 3RD GENERATION   Result Value Ref Range    TSH 0.88 0.36 - 3.74 uIU/mL   T4, FREE   Result Value Ref Range    T4, Free 0.9 0.7 - 1.5 NG/DL     We reviewed the patient's labs from the last several visits to point out trends in the numbers          Patient Active Problem List   Diagnosis Code    Hyperlipidemia E78.5    Hypovitaminosis D E55.9    Chronic pain syndrome G89.4    Degeneration of lumbar or lumbosacral intervertebral disc M51.37    Polyarthralgia M25.50    Generalized OA M15.9    Tobacco dependence syndrome F17.200    Peptic ulcer disease K27.9    Anxiety F41.9    Primary hypertension I10    Left breast mass N63.20    Iron deficiency anemia D50.9     Assessment and plan:  1. Allergic rhinitis. Prn meds  2. Chronic pain syndrome. F/U Dr Rahul Gramajo   3. Anxiety and depression. Continue effexor, buspar and wellbutrin  4. Hyperlipidemia.  At target on lipitor  5. H/O PUD and GERD. PPI and avoidance measures indefinitely  6. HTN. Continue amlo and cotnrolled  7. Smoking cessation meds declined. Yearly screening  8. Hypovit d. Supplementation  9. Anemia. Per Dr Barbra Matta        RTC 3/22    Above conditions discussed at length and patient vocalized understanding. All questions answered to patient satisfaction        ICD-10-CM ICD-9-CM    1. Hyperlipidemia, unspecified hyperlipidemia type  V49.1 141.8 METABOLIC PANEL, COMPREHENSIVE      LIPID PANEL   2. Needs flu shot  Z23 V04.81 INFLUENZA VIRUS VAC QUAD,SPLIT,PRESV FREE SYRINGE IM   3. Primary hypertension  I10 401.9    4. Generalized OA  M15.9 715.00    5. Chronic pain syndrome  G89.4 338.4    6.  Other iron deficiency anemia  D50.8 280.8

## 2021-09-21 ENCOUNTER — OFFICE VISIT (OUTPATIENT)
Dept: INTERNAL MEDICINE CLINIC | Age: 61
End: 2021-09-21
Payer: COMMERCIAL

## 2021-09-21 VITALS
DIASTOLIC BLOOD PRESSURE: 82 MMHG | RESPIRATION RATE: 16 BRPM | HEART RATE: 100 BPM | WEIGHT: 140 LBS | HEIGHT: 64 IN | TEMPERATURE: 97.7 F | SYSTOLIC BLOOD PRESSURE: 139 MMHG | BODY MASS INDEX: 23.9 KG/M2 | OXYGEN SATURATION: 97 %

## 2021-09-21 DIAGNOSIS — G89.4 CHRONIC PAIN SYNDROME: ICD-10-CM

## 2021-09-21 DIAGNOSIS — M15.9 GENERALIZED OA: ICD-10-CM

## 2021-09-21 DIAGNOSIS — D50.8 OTHER IRON DEFICIENCY ANEMIA: ICD-10-CM

## 2021-09-21 DIAGNOSIS — I10 PRIMARY HYPERTENSION: ICD-10-CM

## 2021-09-21 DIAGNOSIS — E78.5 HYPERLIPIDEMIA, UNSPECIFIED HYPERLIPIDEMIA TYPE: Primary | ICD-10-CM

## 2021-09-21 DIAGNOSIS — Z23 NEEDS FLU SHOT: ICD-10-CM

## 2021-09-21 PROCEDURE — 90686 IIV4 VACC NO PRSV 0.5 ML IM: CPT | Performed by: INTERNAL MEDICINE

## 2021-09-21 PROCEDURE — 90471 IMMUNIZATION ADMIN: CPT | Performed by: INTERNAL MEDICINE

## 2021-09-21 PROCEDURE — 99214 OFFICE O/P EST MOD 30 MIN: CPT | Performed by: INTERNAL MEDICINE

## 2021-09-21 RX ORDER — LORATADINE 10 MG/1
10 TABLET ORAL
COMMUNITY

## 2021-09-21 NOTE — PROGRESS NOTES
Zaria Nur presents today for   Chief Complaint   Patient presents with    Follow-up     4 month    Hypertension    Cholesterol Problem    Labs     9-14-21       Coordination of Care:  1. Have you been to the ER, urgent care clinic since your last visit? Hospitalized since your last visit? NO    2. Have you seen or consulted any other health care providers outside of the [address] [place] [address] since your last visit? Include any pap smears or colon screening.  YES

## 2021-09-21 NOTE — PATIENT INSTRUCTIONS
Vaccine Information Statement    Influenza (Flu) Vaccine (Inactivated or Recombinant): What You Need to Know    Many vaccine information statements are available in Estonian and other languages. See www.immunize.org/vis. Hojas de información sobre vacunas están disponibles en español y en muchos otros idiomas. Visite www.immunize.org/vis. 1. Why get vaccinated? Influenza vaccine can prevent influenza (flu). Flu is a contagious disease that spreads around the [facility] every year, usually between October and May. Anyone can get the flu, but it is more dangerous for some people. Infants and young children, people 72 years and older, pregnant people, and people with certain health conditions or a weakened immune system are at greatest risk of flu complications. Pneumonia, bronchitis, sinus infections, and ear infections are examples of flu-related complications. If you have a medical condition, such as heart disease, cancer, or diabetes, flu can make it worse. Flu can cause fever and chills, sore throat, muscle aches, fatigue, cough, headache, and runny or stuffy nose. Some people may have vomiting and diarrhea, though this is more common in children than adults. In an average year, thousands of people in the [facility] die from flu, and many more are hospitalized. Flu vaccine prevents millions of illnesses and flu-related visits to the doctor each year. 2. Influenza vaccines     CDC recommends everyone 6 months and older get vaccinated every flu season. Children 6 months through 6years of age may need 2 doses during a single flu season. Everyone else needs only 1 dose each flu season. It takes about 2 weeks for protection to develop after vaccination. There are many flu viruses, and they are always changing. Each year a new flu vaccine is made to protect against the influenza viruses believed to be likely to cause disease in the upcoming flu season.  Even when the vaccine doesnt exactly match these viruses, it may still provide some protection. Influenza vaccine does not cause flu. Influenza vaccine may be given at the same time as other vaccines. 3. Talk with your health care provider    Tell your vaccination provider if the person getting the vaccine:   Has had an allergic reaction after a previous dose of influenza vaccine, or has any severe, life-threatening allergies    Has ever had Guillain-Barré Syndrome (also called GBS)    In some cases, your health care provider may decide to postpone influenza vaccination until a future visit. Influenza vaccine can be administered at any time during pregnancy. People who are or will be pregnant during influenza season should receive inactivated influenza vaccine. People with minor illnesses, such as a cold, may be vaccinated. People who are moderately or severely ill should usually wait until they recover before getting influenza vaccine. Your health care provider can give you more information. 4. Risks of a vaccine reaction     Soreness, redness, and swelling where the shot is given, fever, muscle aches, and headache can happen after influenza vaccination.  There may be a very small increased risk of Guillain-Barré Syndrome (GBS) after inactivated influenza vaccine (the flu shot). [employer] children who get the flu shot along with pneumococcal vaccine (PCV13) and/or DTaP vaccine at the same time might be slightly more likely to have a seizure caused by fever. Tell your health care provider if a child who is getting flu vaccine has ever had a seizure. People sometimes faint after medical procedures, including vaccination. Tell your provider if you feel dizzy or have vision changes or ringing in the ears. As with any medicine, there is a very remote chance of a vaccine causing a severe allergic reaction, other serious injury, or death. 5. What if there is a serious problem?     An allergic reaction could occur after the vaccinated person leaves the clinic. If you see signs of a severe allergic reaction (hives, swelling of the face and throat, difficulty breathing, a fast heartbeat, dizziness, or weakness), call 9-1-1 and get the person to the nearest hospital.    For other signs that concern you, call your health care provider. Adverse reactions should be reported to the Vaccine Adverse Event Reporting System (VAERS). Your health care provider will usually file this report, or you can do it yourself. Visit the VAERS website at www.vaers. [facility].gov or call 6-700.321.4827. VAERS is only for reporting reactions, and VAERS staff members do not give medical advice. 6. The National Vaccine Injury Compensation Program    The [facility] Vaccine Injury Compensation Program (VICP) is a federal program that was created to compensate people who may have been injured by certain vaccines. Claims regarding alleged injury or death due to vaccination have a time limit for filing, which may be as short as two years. Visit the VICP website at www.[facility].gov/vaccinecompensation or call 4-425.178.8850 to learn about the program and about filing a claim. 7. How can I learn more?  Ask your health care provider.  Call your local or state health department.  Visit the website of the Food and Drug Administration (FDA) for vaccine package inserts and additional information at www.fda.gov/vaccines-blood-biologics/vaccines.  Contact the Centers for Disease Control and Prevention (CDC):  - Call 9-815.364.3660 (1-800-CDC-INFO) or  - Visit CDCs influenza website at www.cdc.gov/flu. Vaccine Information Statement   Inactivated Influenza Vaccine   8/6/2021  42 BEVERLEY Hurt [id number]   Department of Health and Human Services  Centers for Disease Control and Prevention    Office Use Only

## 2021-09-29 ENCOUNTER — HOSPITAL ENCOUNTER (OUTPATIENT)
Dept: LAB | Age: 61
Discharge: HOME | End: 2021-09-29
Payer: COMMERCIAL

## 2021-09-29 ENCOUNTER — LAB ONLY (OUTPATIENT)
Dept: ONCOLOGY | Age: 61
End: 2021-09-29

## 2021-09-29 DIAGNOSIS — D64.9 CHRONIC ANEMIA: ICD-10-CM

## 2021-09-29 DIAGNOSIS — D64.9 NORMOCYTIC ANEMIA: Primary | ICD-10-CM

## 2021-09-29 DIAGNOSIS — D50.9 IRON DEFICIENCY ANEMIA, UNSPECIFIED IRON DEFICIENCY ANEMIA TYPE: ICD-10-CM

## 2021-09-29 LAB
ALBUMIN SERPL-MCNC: 3.6 G/DL (ref 3.4–5)
ALBUMIN/GLOB SERPL: 1.2 {RATIO} (ref 0.8–1.7)
ALP SERPL-CCNC: 91 U/L (ref 45–117)
ALT SERPL-CCNC: 27 U/L (ref 13–56)
ANION GAP SERPL CALC-SCNC: 1 MMOL/L (ref 3–18)
AST SERPL-CCNC: 28 U/L (ref 10–38)
BASOPHILS # BLD: 0 K/UL (ref 0–0.1)
BASOPHILS NFR BLD: 1 % (ref 0–2)
BILIRUB SERPL-MCNC: 0.2 MG/DL (ref 0.2–1)
BUN SERPL-MCNC: 18 MG/DL (ref 7–18)
BUN/CREAT SERPL: 20 (ref 12–20)
CALCIUM SERPL-MCNC: 8.6 MG/DL (ref 8.5–10.1)
CHLORIDE SERPL-SCNC: 110 MMOL/L (ref 100–111)
CO2 SERPL-SCNC: 28 MMOL/L (ref 21–32)
CREAT SERPL-MCNC: 0.92 MG/DL (ref 0.6–1.3)
DIFFERENTIAL METHOD BLD: ABNORMAL
EOSINOPHIL # BLD: 0.1 K/UL (ref 0–0.4)
EOSINOPHIL NFR BLD: 2 % (ref 0–5)
ERYTHROCYTE [DISTWIDTH] IN BLOOD BY AUTOMATED COUNT: 11.6 % (ref 11.6–14.5)
FERRITIN SERPL-MCNC: 23 NG/ML (ref 8–388)
GLOBULIN SER CALC-MCNC: 3.1 G/DL (ref 2–4)
GLUCOSE SERPL-MCNC: 90 MG/DL (ref 74–99)
HCT VFR BLD AUTO: 36.8 % (ref 35–45)
HGB BLD-MCNC: 11.6 G/DL (ref 12–16)
IRON SATN MFR SERPL: 21 % (ref 20–50)
IRON SERPL-MCNC: 67 UG/DL (ref 50–175)
LYMPHOCYTES # BLD: 1.9 K/UL (ref 0.9–3.6)
LYMPHOCYTES NFR BLD: 39 % (ref 21–52)
MCH RBC QN AUTO: 32.1 PG (ref 24–34)
MCHC RBC AUTO-ENTMCNC: 31.5 G/DL (ref 31–37)
MCV RBC AUTO: 101.9 FL (ref 78–100)
MONOCYTES # BLD: 0.4 K/UL (ref 0.05–1.2)
MONOCYTES NFR BLD: 8 % (ref 3–10)
NEUTS SEG # BLD: 2.5 K/UL (ref 1.8–8)
NEUTS SEG NFR BLD: 51 % (ref 40–73)
PLATELET # BLD AUTO: 262 K/UL (ref 135–420)
PMV BLD AUTO: 9.8 FL (ref 9.2–11.8)
POTASSIUM SERPL-SCNC: 4.7 MMOL/L (ref 3.5–5.5)
PROT SERPL-MCNC: 6.7 G/DL (ref 6.4–8.2)
RBC # BLD AUTO: 3.61 M/UL (ref 4.2–5.3)
SODIUM SERPL-SCNC: 139 MMOL/L (ref 136–145)
TIBC SERPL-MCNC: 314 UG/DL (ref 250–450)
WBC # BLD AUTO: 4.9 K/UL (ref 4.6–13.2)

## 2021-09-29 PROCEDURE — 82728 ASSAY OF FERRITIN: CPT

## 2021-09-29 PROCEDURE — 83540 ASSAY OF IRON: CPT

## 2021-09-29 PROCEDURE — 85025 COMPLETE CBC W/AUTO DIFF WBC: CPT

## 2021-09-29 PROCEDURE — 36415 COLL VENOUS BLD VENIPUNCTURE: CPT

## 2021-09-29 PROCEDURE — 80053 COMPREHEN METABOLIC PANEL: CPT

## 2021-10-13 ENCOUNTER — VIRTUAL VISIT (OUTPATIENT)
Dept: ONCOLOGY | Age: 61
End: 2021-10-13
Payer: COMMERCIAL

## 2021-10-13 DIAGNOSIS — D64.9 NORMOCYTIC ANEMIA: ICD-10-CM

## 2021-10-13 DIAGNOSIS — D64.9 CHRONIC ANEMIA: ICD-10-CM

## 2021-10-13 DIAGNOSIS — D50.9 IRON DEFICIENCY ANEMIA, UNSPECIFIED IRON DEFICIENCY ANEMIA TYPE: Primary | ICD-10-CM

## 2021-10-13 DIAGNOSIS — D50.8 OTHER IRON DEFICIENCY ANEMIA: ICD-10-CM

## 2021-10-13 PROCEDURE — 99213 OFFICE O/P EST LOW 20 MIN: CPT | Performed by: INTERNAL MEDICINE

## 2021-10-13 RX ORDER — ACETAMINOPHEN 325 MG/1
650 TABLET ORAL AS NEEDED
Status: CANCELLED
Start: 2021-11-03

## 2021-10-13 RX ORDER — HYDROCORTISONE SODIUM SUCCINATE 100 MG/2ML
100 INJECTION, POWDER, FOR SOLUTION INTRAMUSCULAR; INTRAVENOUS AS NEEDED
Status: CANCELLED | OUTPATIENT
Start: 2021-11-03

## 2021-10-13 RX ORDER — DIPHENHYDRAMINE HYDROCHLORIDE 50 MG/ML
25 INJECTION, SOLUTION INTRAMUSCULAR; INTRAVENOUS AS NEEDED
Status: CANCELLED
Start: 2021-11-03

## 2021-10-13 RX ORDER — ONDANSETRON 2 MG/ML
8 INJECTION INTRAMUSCULAR; INTRAVENOUS AS NEEDED
Status: CANCELLED | OUTPATIENT
Start: 2021-11-03

## 2021-10-13 RX ORDER — SODIUM CHLORIDE 9 MG/ML
25 INJECTION, SOLUTION INTRAVENOUS CONTINUOUS
Status: CANCELLED | OUTPATIENT
Start: 2021-11-03

## 2021-10-13 RX ORDER — SODIUM CHLORIDE 9 MG/ML
10 INJECTION INTRAMUSCULAR; INTRAVENOUS; SUBCUTANEOUS AS NEEDED
Status: CANCELLED | OUTPATIENT
Start: 2021-11-03

## 2021-10-13 RX ORDER — DIPHENHYDRAMINE HYDROCHLORIDE 50 MG/ML
50 INJECTION, SOLUTION INTRAMUSCULAR; INTRAVENOUS AS NEEDED
Status: CANCELLED
Start: 2021-11-03

## 2021-10-13 RX ORDER — ALBUTEROL SULFATE 0.83 MG/ML
2.5 SOLUTION RESPIRATORY (INHALATION) AS NEEDED
Status: CANCELLED
Start: 2021-11-03

## 2021-10-13 RX ORDER — SODIUM CHLORIDE 0.9 % (FLUSH) 0.9 %
10 SYRINGE (ML) INJECTION AS NEEDED
Status: CANCELLED | OUTPATIENT
Start: 2021-11-03

## 2021-10-13 RX ORDER — EPINEPHRINE 1 MG/ML
0.3 INJECTION, SOLUTION, CONCENTRATE INTRAVENOUS AS NEEDED
Status: CANCELLED | OUTPATIENT
Start: 2021-11-03

## 2021-10-13 RX ORDER — HEPARIN 100 UNIT/ML
300-500 SYRINGE INTRAVENOUS AS NEEDED
Status: CANCELLED
Start: 2021-11-03

## 2021-10-13 NOTE — PROGRESS NOTES
Moses Martinez is a 64 y.o. female, evaluated via audio-only technology on 10/13/2021 for No chief complaint on file. .    Assessment & Plan:   #Iron deficiency anemia  # Normocytic Anemia  -- The patient has a past medical history significant of gastritis, mild duodenal deformity, and iron deficiency anemia. -- Lab reviews indicated chronic anemia since at least 1/20/2011 hemoglobin 10.4.  -- 3/8/2021 SPEP/ANTONIO/SFLC reported the immunofixation pattern appears unremarkable. Evidence of   monoclonal protein is not apparent. -- The patient could not tolerate iron pills. She was arranged for IV Injectafer. .  -- 3/25/2021 s.p IV Injectafer x 2, 1 week apart. Tolerated it well. -- Today I have reviewed with the patient about recent lab reports. 9/29/2021 CBC reported hemoglobin 11.6, hematocrit 36.8%, .9, WBC 4.9, platelet 764,555. Iron profile showed saturation 21% and ferritin improving to 23.     Plan:  -- She will be arrange for iron infusion in the from of Injectafer 750 mg x 2 doses   -- We will continue to monitor CBC, Iron profiles and ferritin periodically. -- The patient will f/u with her GI if any further EGD/Colonoscopy indicated. -- We will see the patient back in clinic in about 4 months. Always sooner if required. The patient can have lab done prior to our next clinic visit.          12  Subjective:   Ms. Yaneth Merino is a 61y.o. year old female who was seen for iron deficiency anemia. The patient has a past medical history significant of gastritis, mild duodenal deformity, and iron deficiency anemia. The patient reported she could not tolerate iron pills as it made her sick, nausea, and being tired. She reported her chronic fatigue and low energy level. The patient Denied fever, chills, night sweat, unintentional weight loss, skin lumps or bumps, acute bleeding or bruising issues.  No acute bleeding, blood in stool, dark stool, melena, hematochezia, hemoptysis, dark urine, or easily bruising. Denied headache, acute vision change, dizziness, chest pain, worsen shortness of breath, palpitation, productive cough, nausea, vomiting, abdominal pain, altered bowel habits, dysuria, worsen bone pain or back pain, new focal numbness or weakness. Prior to Admission medications    Medication Sig Start Date End Date Taking? Authorizing Provider   loratadine (Claritin) 10 mg tablet Take 10 mg by mouth. Provider, Historical   Prevalite 4 gram packet TAKE 1 PACKET BY MOUTH TWO (2) TIMES A DAY. 9/15/21   Susana Larsen MD   atorvastatin (LIPITOR) 40 mg tablet Take 1 Tab by mouth daily. 5/14/21   Pravin Dc MD   amLODIPine (NORVASC) 5 mg tablet TAKE 1 TABLET BY MOUTH EVERY DAY 4/9/21   Susana Larsen MD   butalbital-acetaminophen-caffeine (FIORICET, ESGIC) -40 mg per tablet TAKE 2 TABLETS ORALLY 3 TIMES A DAY FOR CHRONIC HEADACHES. 3/4/21   Provider, Historical   busPIRone (BUSPAR) 30 mg tablet TAKE 1 TABLET BY MOUTH TWICE A DAY 1/3/21   Provider, Historical   venlafaxine-SR (EFFEXOR-XR) 75 mg capsule TAKE 3 CAPSULES BY MOUTH EVERY DAY 2/19/21   Provider, Historical   LORazepam (Ativan) 1 mg tablet Take 1 mg by mouth two (2) times daily as needed for Anxiety. Provider, Historical   cholecalciferol (VITAMIN D3) (2,000 UNITS /50 MCG) cap capsule Take  by mouth daily. Provider, Historical   HYDROcodone-acetaminophen (NORCO)  mg tablet Take 1 Tab by mouth every six (6) hours as needed for Pain. Indications: pain    Provider, Historical   pantoprazole (PROTONIX) 40 mg tablet Take 1 Tab by mouth daily. 6/10/20   Susana Larsen MD   buPROPion XL (Wellbutrin XL) 300 mg XL tablet Take 300 mg by mouth nightly. Provider, Historical   SUMAtriptan (IMITREX) 100 mg tablet Take 1 Tab by mouth once as needed for Migraine for up to 1 dose.  Indications: Migraine 10/24/17   Manasa Sunshine PA-C   cyclobenzaprine (FLEXERIL) 10 mg tablet TAKE 1 TABLET BY MOUTH THREE TIMES DAILY AS NEEDED FOR MUSCLE SPASM(S)--- 90 day supply  Indications: MUSCLE SPASM 7/28/17   Queen Mireille MD   naloxone 4 mg/actuation spry 4 mg by Nasal route as needed for up to 2 doses. Indications: OPIOID TOXICITY 5/2/17   Queen Mireille MD         Review of Systems   Constitutional: Negative. HENT: Negative. Eyes: Negative. Respiratory: Negative. Cardiovascular: Negative. Gastrointestinal: Negative. Genitourinary: Negative. Musculoskeletal: Negative. Skin: Negative. Neurological: Negative. Endo/Heme/Allergies: Negative. Psychiatric/Behavioral: Negative. Patient-Reported Vitals 10/13/2021   Patient-Reported Weight 135lbs       Tiera Shields, who was evaluated through a patient-initiated, synchronous (real-time) audio only encounter, and/or her healthcare decision maker, is aware that it is a billable service, with coverage as determined by her insurance carrier. She provided verbal consent to proceed: YES -Consent obtained within past 12 months:Yes. . She has not had a related appointment within my department in the past 7 days or scheduled within the next 24 hours. Time Documentation 20 min     Enriqueta Cabrera NP       I have assessed the patient independently and agreed with the full assessment as outlined. Deondre RIOS Do, M.D.

## 2021-11-03 ENCOUNTER — HOSPITAL ENCOUNTER (OUTPATIENT)
Dept: INFUSION THERAPY | Age: 61
Discharge: HOME | End: 2021-11-03
Attending: NURSE PRACTITIONER
Payer: COMMERCIAL

## 2021-11-03 VITALS
OXYGEN SATURATION: 99 % | SYSTOLIC BLOOD PRESSURE: 149 MMHG | RESPIRATION RATE: 18 BRPM | HEART RATE: 88 BPM | DIASTOLIC BLOOD PRESSURE: 86 MMHG | TEMPERATURE: 98.2 F

## 2021-11-03 DIAGNOSIS — D50.8 OTHER IRON DEFICIENCY ANEMIA: Primary | ICD-10-CM

## 2021-11-03 PROCEDURE — 96365 THER/PROPH/DIAG IV INF INIT: CPT

## 2021-11-03 PROCEDURE — 74011250636 HC RX REV CODE- 250/636: Performed by: NURSE PRACTITIONER

## 2021-11-03 PROCEDURE — 96372 THER/PROPH/DIAG INJ SC/IM: CPT

## 2021-11-03 RX ORDER — EPINEPHRINE 1 MG/ML
0.3 INJECTION, SOLUTION, CONCENTRATE INTRAVENOUS AS NEEDED
Status: CANCELLED | OUTPATIENT
Start: 2021-11-10

## 2021-11-03 RX ORDER — HEPARIN 100 UNIT/ML
300-500 SYRINGE INTRAVENOUS AS NEEDED
Status: CANCELLED
Start: 2021-11-10

## 2021-11-03 RX ORDER — SODIUM CHLORIDE 0.9 % (FLUSH) 0.9 %
10-20 SYRINGE (ML) INJECTION AS NEEDED
Status: DISCONTINUED | OUTPATIENT
Start: 2021-11-03 | End: 2021-11-07 | Stop reason: HOSPADM

## 2021-11-03 RX ORDER — DIPHENHYDRAMINE HYDROCHLORIDE 50 MG/ML
25 INJECTION, SOLUTION INTRAMUSCULAR; INTRAVENOUS AS NEEDED
Status: CANCELLED
Start: 2021-11-10

## 2021-11-03 RX ORDER — SODIUM CHLORIDE 9 MG/ML
25 INJECTION, SOLUTION INTRAVENOUS CONTINUOUS
Status: DISCONTINUED | OUTPATIENT
Start: 2021-11-03 | End: 2021-11-04 | Stop reason: HOSPADM

## 2021-11-03 RX ORDER — SODIUM CHLORIDE 9 MG/ML
25 INJECTION, SOLUTION INTRAVENOUS CONTINUOUS
Status: CANCELLED | OUTPATIENT
Start: 2021-11-10

## 2021-11-03 RX ORDER — ACETAMINOPHEN 325 MG/1
650 TABLET ORAL AS NEEDED
Status: CANCELLED
Start: 2021-11-10

## 2021-11-03 RX ORDER — SODIUM CHLORIDE 0.9 % (FLUSH) 0.9 %
10 SYRINGE (ML) INJECTION AS NEEDED
Status: CANCELLED | OUTPATIENT
Start: 2021-11-10

## 2021-11-03 RX ORDER — ALBUTEROL SULFATE 0.83 MG/ML
2.5 SOLUTION RESPIRATORY (INHALATION) AS NEEDED
Status: CANCELLED
Start: 2021-11-10

## 2021-11-03 RX ORDER — HYDROCORTISONE SODIUM SUCCINATE 100 MG/2ML
100 INJECTION, POWDER, FOR SOLUTION INTRAMUSCULAR; INTRAVENOUS AS NEEDED
Status: CANCELLED | OUTPATIENT
Start: 2021-11-10

## 2021-11-03 RX ORDER — SODIUM CHLORIDE 9 MG/ML
10 INJECTION INTRAMUSCULAR; INTRAVENOUS; SUBCUTANEOUS AS NEEDED
Status: CANCELLED | OUTPATIENT
Start: 2021-11-10

## 2021-11-03 RX ORDER — DIPHENHYDRAMINE HYDROCHLORIDE 50 MG/ML
50 INJECTION, SOLUTION INTRAMUSCULAR; INTRAVENOUS AS NEEDED
Status: CANCELLED
Start: 2021-11-10

## 2021-11-03 RX ORDER — ONDANSETRON 2 MG/ML
8 INJECTION INTRAMUSCULAR; INTRAVENOUS AS NEEDED
Status: CANCELLED | OUTPATIENT
Start: 2021-11-10

## 2021-11-03 RX ADMIN — FERRIC CARBOXYMALTOSE INJECTION 750 MG: 50 INJECTION, SOLUTION INTRAVENOUS at 14:45

## 2021-11-03 RX ADMIN — Medication 10 ML: at 15:28

## 2021-11-03 RX ADMIN — Medication 10 ML: at 14:00

## 2021-11-03 RX ADMIN — SODIUM CHLORIDE 25 ML/HR: 9 INJECTION, SOLUTION INTRAVENOUS at 14:45

## 2021-11-03 NOTE — PROGRESS NOTES
[facility] Progress Note    Date: November 3, 2021    Name: Kael Long    MRN: [id number]         : 1960       WEEKLY Adam Ashraf, DOSE 1OF 2      Ms. Alexx Washburn arrived to [facility] at 6473. Ms. Alexx Washburn was assessed and education was provided. Ms. Singh's vitals were reviewed. Visit Vitals  BP (!) 150/78 (BP 1 Location: Right upper arm, BP Patient Position: Sitting)   Pulse 97   Temp 98.3 °F (36.8 °C)   Resp 16   SpO2 100%   Breastfeeding No       22g IV inserted in patient's right AC x2 attempt. Brisk blood return/ flushes without difficulty. Injectafer 750mg in 250ml NS administered as ordered followed by NS flush. Ms. Alexx Washburn tolerated well without complaints. VSS. No s/sx of adverse reaction. Pt refused to stay for 30 minute observation following completion of infusion. Pt denies complaints. Repeat VSS. No s/sx of adverse reaction. Visit Vitals  BP (!) 149/86   Pulse 88   Temp 98.2 °F (36.8 °C)   Resp 18   SpO2 99%   Breastfeeding No         Discharge/ follow-up instructions discussed w/ pt. Pt verbalized understanding. IV removed/ intact. Gauze/ coban to site. Pt armband removed & shredded. Ms. Alexx Washburn was discharged from [place] [id number] in stable condition at 1530. She is to return on 11/10/21 at 1400 for her next appointment.     Christina Moulton RN  November 3, 2021

## 2021-11-10 ENCOUNTER — HOSPITAL ENCOUNTER (OUTPATIENT)
Dept: INFUSION THERAPY | Age: 61
Discharge: HOME | End: 2021-11-10
Attending: NURSE PRACTITIONER
Payer: COMMERCIAL

## 2021-11-10 VITALS
RESPIRATION RATE: 18 BRPM | SYSTOLIC BLOOD PRESSURE: 142 MMHG | OXYGEN SATURATION: 100 % | DIASTOLIC BLOOD PRESSURE: 81 MMHG | TEMPERATURE: 98.1 F | HEART RATE: 93 BPM

## 2021-11-10 DIAGNOSIS — D50.8 OTHER IRON DEFICIENCY ANEMIA: Primary | ICD-10-CM

## 2021-11-10 PROCEDURE — 96365 THER/PROPH/DIAG IV INF INIT: CPT

## 2021-11-10 PROCEDURE — 74011250636 HC RX REV CODE- 250/636: Performed by: NURSE PRACTITIONER

## 2021-11-10 RX ORDER — SODIUM CHLORIDE 9 MG/ML
25 INJECTION, SOLUTION INTRAVENOUS CONTINUOUS
Status: DISCONTINUED | OUTPATIENT
Start: 2021-11-10 | End: 2021-11-11 | Stop reason: HOSPADM

## 2021-11-10 RX ORDER — DIPHENHYDRAMINE HYDROCHLORIDE 50 MG/ML
50 INJECTION, SOLUTION INTRAMUSCULAR; INTRAVENOUS AS NEEDED
Status: CANCELLED
Start: 2021-11-17

## 2021-11-10 RX ORDER — SODIUM CHLORIDE 0.9 % (FLUSH) 0.9 %
10 SYRINGE (ML) INJECTION AS NEEDED
Status: DISCONTINUED | OUTPATIENT
Start: 2021-11-10 | End: 2021-11-11 | Stop reason: HOSPADM

## 2021-11-10 RX ORDER — ALBUTEROL SULFATE 0.83 MG/ML
2.5 SOLUTION RESPIRATORY (INHALATION) AS NEEDED
Status: CANCELLED
Start: 2021-11-17

## 2021-11-10 RX ORDER — DIPHENHYDRAMINE HYDROCHLORIDE 50 MG/ML
25 INJECTION, SOLUTION INTRAMUSCULAR; INTRAVENOUS AS NEEDED
Status: CANCELLED
Start: 2021-11-17

## 2021-11-10 RX ORDER — ACETAMINOPHEN 325 MG/1
650 TABLET ORAL AS NEEDED
Status: CANCELLED
Start: 2021-11-17

## 2021-11-10 RX ORDER — ONDANSETRON 2 MG/ML
8 INJECTION INTRAMUSCULAR; INTRAVENOUS AS NEEDED
Status: CANCELLED | OUTPATIENT
Start: 2021-11-17

## 2021-11-10 RX ORDER — SODIUM CHLORIDE 9 MG/ML
10 INJECTION INTRAMUSCULAR; INTRAVENOUS; SUBCUTANEOUS AS NEEDED
Status: CANCELLED | OUTPATIENT
Start: 2021-11-17

## 2021-11-10 RX ORDER — HEPARIN 100 UNIT/ML
300-500 SYRINGE INTRAVENOUS AS NEEDED
Status: CANCELLED
Start: 2021-11-17

## 2021-11-10 RX ORDER — HYDROCORTISONE SODIUM SUCCINATE 100 MG/2ML
100 INJECTION, POWDER, FOR SOLUTION INTRAMUSCULAR; INTRAVENOUS AS NEEDED
Status: CANCELLED | OUTPATIENT
Start: 2021-11-17

## 2021-11-10 RX ORDER — SODIUM CHLORIDE 0.9 % (FLUSH) 0.9 %
10 SYRINGE (ML) INJECTION AS NEEDED
Status: CANCELLED | OUTPATIENT
Start: 2021-11-17

## 2021-11-10 RX ORDER — EPINEPHRINE 1 MG/ML
0.3 INJECTION, SOLUTION, CONCENTRATE INTRAVENOUS AS NEEDED
Status: CANCELLED | OUTPATIENT
Start: 2021-11-17

## 2021-11-10 RX ORDER — SODIUM CHLORIDE 9 MG/ML
25 INJECTION, SOLUTION INTRAVENOUS CONTINUOUS
Status: CANCELLED | OUTPATIENT
Start: 2021-11-17

## 2021-11-10 RX ADMIN — SODIUM CHLORIDE 25 ML/HR: 9 INJECTION, SOLUTION INTRAVENOUS at 14:23

## 2021-11-10 RX ADMIN — Medication 10 ML: at 14:52

## 2021-11-10 RX ADMIN — Medication 10 ML: at 14:15

## 2021-11-10 RX ADMIN — FERRIC CARBOXYMALTOSE INJECTION 750 MG: 50 INJECTION, SOLUTION INTRAVENOUS at 14:23

## 2021-11-10 NOTE — PROGRESS NOTES
[facility] Progress Note    Date: November 10, 2021    Name: Juliet Alcazar    MRN: [id number]         : 1960       WEEKLY Fox Badder, DOSE 2 OF 2      Ms. Rothman arrived to [facility] at [phone]. Ms. Rothman was assessed and education was provided. Ms. Singh's vitals were reviewed. Pt presented tachycardic upon admission. Pt stated she was running around completing errands all day and that was probably why. Pt sat for approximately 15 min to relax and HR came down below 100, which was sufficient for today's infusion. Visit Vitals  BP (!) 142/81 (BP 1 Location: Left arm, BP Patient Position: Sitting)   Pulse 93   Temp 98.1 °F (36.7 °C)   Resp 18   SpO2 100%       22g IV inserted in patient's right AC x1 attempt. Brisk blood return/ flushes without difficulty. Injectafer 750mg in 250ml NS administered as ordered followed by NS flush. Ms. Rothman tolerated well without complaints. VSS. No s/sx of adverse reaction. Discharge/ follow-up instructions discussed w/ pt. Pt verbalized understanding. IV removed/ intact. Gauze/ coban to site. Pt armband removed & shredded. Ms. Rothman was discharged from [place] [id number] in stable condition at 1455.      Marlen Zamudio RN  November 10, 2021

## 2022-02-03 ENCOUNTER — LAB ONLY (OUTPATIENT)
Dept: ONCOLOGY | Age: 62
End: 2022-02-03

## 2022-02-03 ENCOUNTER — HOSPITAL ENCOUNTER (OUTPATIENT)
Dept: LAB | Age: 62
Discharge: HOME | End: 2022-02-03
Payer: COMMERCIAL

## 2022-02-03 DIAGNOSIS — D64.9 NORMOCYTIC ANEMIA: ICD-10-CM

## 2022-02-03 DIAGNOSIS — D50.8 OTHER IRON DEFICIENCY ANEMIA: ICD-10-CM

## 2022-02-03 DIAGNOSIS — D50.8 OTHER IRON DEFICIENCY ANEMIA: Primary | ICD-10-CM

## 2022-02-03 DIAGNOSIS — D50.9 IRON DEFICIENCY ANEMIA, UNSPECIFIED IRON DEFICIENCY ANEMIA TYPE: ICD-10-CM

## 2022-02-03 DIAGNOSIS — D64.9 CHRONIC ANEMIA: ICD-10-CM

## 2022-02-03 LAB
ALBUMIN SERPL-MCNC: 4 G/DL (ref 3.4–5)
ALBUMIN/GLOB SERPL: 1.3 {RATIO} (ref 0.8–1.7)
ALP SERPL-CCNC: 100 U/L (ref 45–117)
ALT SERPL-CCNC: 104 U/L (ref 13–56)
ANION GAP SERPL CALC-SCNC: 4 MMOL/L (ref 3–18)
AST SERPL-CCNC: 82 U/L (ref 10–38)
BASOPHILS # BLD: 0.1 K/UL (ref 0–0.1)
BASOPHILS NFR BLD: 1 % (ref 0–2)
BILIRUB SERPL-MCNC: 0.1 MG/DL (ref 0.2–1)
BUN SERPL-MCNC: 18 MG/DL (ref 7–18)
BUN/CREAT SERPL: 21 (ref 12–20)
CALCIUM SERPL-MCNC: 9.1 MG/DL (ref 8.5–10.1)
CHLORIDE SERPL-SCNC: 108 MMOL/L (ref 100–111)
CO2 SERPL-SCNC: 29 MMOL/L (ref 21–32)
CREAT SERPL-MCNC: 0.84 MG/DL (ref 0.6–1.3)
DIFFERENTIAL METHOD BLD: ABNORMAL
EOSINOPHIL # BLD: 0.1 K/UL (ref 0–0.4)
EOSINOPHIL NFR BLD: 1 % (ref 0–5)
ERYTHROCYTE [DISTWIDTH] IN BLOOD BY AUTOMATED COUNT: 11.9 % (ref 11.6–14.5)
FERRITIN SERPL-MCNC: 234 NG/ML (ref 8–388)
GLOBULIN SER CALC-MCNC: 3 G/DL (ref 2–4)
GLUCOSE SERPL-MCNC: 90 MG/DL (ref 74–99)
HCT VFR BLD AUTO: 40.1 % (ref 35–45)
HGB BLD-MCNC: 12.8 G/DL (ref 12–16)
IMM GRANULOCYTES # BLD AUTO: 0 K/UL (ref 0–0.04)
IMM GRANULOCYTES NFR BLD AUTO: 0 % (ref 0–0.5)
IRON SATN MFR SERPL: 38 % (ref 20–50)
IRON SERPL-MCNC: 110 UG/DL (ref 50–175)
LYMPHOCYTES # BLD: 1.6 K/UL (ref 0.9–3.6)
LYMPHOCYTES NFR BLD: 26 % (ref 21–52)
MCH RBC QN AUTO: 33.1 PG (ref 24–34)
MCHC RBC AUTO-ENTMCNC: 31.9 G/DL (ref 31–37)
MCV RBC AUTO: 103.6 FL (ref 78–100)
MONOCYTES # BLD: 0.5 K/UL (ref 0.05–1.2)
MONOCYTES NFR BLD: 9 % (ref 3–10)
NEUTS SEG # BLD: 3.8 K/UL (ref 1.8–8)
NEUTS SEG NFR BLD: 63 % (ref 40–73)
NRBC # BLD: 0 K/UL (ref 0–0.01)
NRBC BLD-RTO: 0 PER 100 WBC
PLATELET # BLD AUTO: 279 K/UL (ref 135–420)
PMV BLD AUTO: 9.8 FL (ref 9.2–11.8)
POTASSIUM SERPL-SCNC: 4.4 MMOL/L (ref 3.5–5.5)
PROT SERPL-MCNC: 7 G/DL (ref 6.4–8.2)
RBC # BLD AUTO: 3.87 M/UL (ref 4.2–5.3)
SODIUM SERPL-SCNC: 141 MMOL/L (ref 136–145)
TIBC SERPL-MCNC: 286 UG/DL (ref 250–450)
WBC # BLD AUTO: 6.1 K/UL (ref 4.6–13.2)

## 2022-02-03 PROCEDURE — 82728 ASSAY OF FERRITIN: CPT

## 2022-02-03 PROCEDURE — 36415 COLL VENOUS BLD VENIPUNCTURE: CPT

## 2022-02-03 PROCEDURE — 80053 COMPREHEN METABOLIC PANEL: CPT

## 2022-02-03 PROCEDURE — 83540 ASSAY OF IRON: CPT

## 2022-02-03 PROCEDURE — 85025 COMPLETE CBC W/AUTO DIFF WBC: CPT

## 2022-02-17 ENCOUNTER — VIRTUAL VISIT (OUTPATIENT)
Dept: ONCOLOGY | Age: 62
End: 2022-02-17
Payer: COMMERCIAL

## 2022-02-17 DIAGNOSIS — D64.9 CHRONIC ANEMIA: ICD-10-CM

## 2022-02-17 DIAGNOSIS — D64.9 NORMOCYTIC ANEMIA: ICD-10-CM

## 2022-02-17 DIAGNOSIS — D50.9 IRON DEFICIENCY ANEMIA, UNSPECIFIED IRON DEFICIENCY ANEMIA TYPE: Primary | ICD-10-CM

## 2022-02-17 PROCEDURE — 99213 OFFICE O/P EST LOW 20 MIN: CPT | Performed by: INTERNAL MEDICINE

## 2022-02-17 NOTE — PROGRESS NOTES
Liliana Merino is a 64 y.o. female, evaluated via audio-only technology on 2/17/2022 for Follow-up  . Assessment & Plan:   Diagnoses and all orders for this visit:    1. Iron deficiency anemia, unspecified iron deficiency anemia type    2. Normocytic anemia    3. Chronic anemia      The complexity of medical decision making for this visit is moderate. #Iron deficiency anemia  # Normocytic Anemia  -- The patient has a past medical history significant of gastritis, mild duodenal deformity, and iron deficiency anemia. -- Lab reviews indicated chronic anemia since at least 1/20/2011 hemoglobin 10.4.  -- 3/8/2021 SPEP/ANTONIO/SFLC reported the immunofixation pattern appears unremarkable. Evidence of   monoclonal protein is not apparent. -- The patient could not tolerate iron pills. She was arranged for IV Injectafer. .  -- 3/25/2021 s.p IV Injectafer x 2, 1 week apart. Tolerated it well.   -- 9/29/2021 CBC reported hemoglobin 11.6, hematocrit 36.8%, .9, WBC 4.9, platelet 535,934. Iron profile showed saturation 21% and ferritin improving to 23.  -- 11/10/2021 S/p Injectafer x 2   -- Today I have reviewed with the patient about recent lab reports. 2/3/2022 H/H 12.8/40. 1. Iron sat 38%, Ferritin improved to 234. Plan:  -- Continue lab check CBC, Iron profiles and ferritin. -- The patient will f/u with her GI if any further EGD/Colonoscopy indicated. -- We will see the patient back in clinic in about 6 months. Always sooner if required. The patient can have lab done prior to our next clinic visit. 12  Subjective:   Ms. Rudy Parks is a 64y.o. year old female who was seen for iron deficiency anemia. The patient has a past medical history significant of gastritis, mild duodenal deformity, and iron deficiency anemia. The patient reported she could not tolerate iron pills as it made her sick, nausea, and being tired. She reported her chronic fatigue and low energy level.  The patient Denied fever, chills, night sweat, unintentional weight loss, skin lumps or bumps, acute bleeding or bruising issues. No acute bleeding, blood in stool, dark stool, melena, hematochezia, hemoptysis, dark urine, or easily bruising. Denied headache, acute vision change, dizziness, chest pain, worsen shortness of breath, palpitation, productive cough, nausea, vomiting, abdominal pain, altered bowel habits, dysuria, worsen bone pain or back pain, new focal numbness or weakness. Prior to Admission medications    Medication Sig Start Date End Date Taking? Authorizing Provider   loratadine (Claritin) 10 mg tablet Take 10 mg by mouth. Provider, Historical   Prevalite 4 gram packet TAKE 1 PACKET BY MOUTH TWO (2) TIMES A DAY. 9/15/21   Elly Burton MD   atorvastatin (LIPITOR) 40 mg tablet Take 1 Tab by mouth daily. 5/14/21   Pravin Dc MD   amLODIPine (NORVASC) 5 mg tablet TAKE 1 TABLET BY MOUTH EVERY DAY 4/9/21   Elly Burton MD   butalbital-acetaminophen-caffeine (FIORICET, ESGIC) -40 mg per tablet TAKE 2 TABLETS ORALLY 3 TIMES A DAY FOR CHRONIC HEADACHES. 3/4/21   Provider, Historical   busPIRone (BUSPAR) 30 mg tablet TAKE 1 TABLET BY MOUTH TWICE A DAY 1/3/21   Provider, Historical   venlafaxine-SR (EFFEXOR-XR) 75 mg capsule TAKE 3 CAPSULES BY MOUTH EVERY DAY 2/19/21   Provider, Historical   LORazepam (Ativan) 1 mg tablet Take 1 mg by mouth two (2) times daily as needed for Anxiety. Provider, Historical   cholecalciferol (VITAMIN D3) (2,000 UNITS /50 MCG) cap capsule Take  by mouth daily. Provider, Historical   HYDROcodone-acetaminophen (NORCO)  mg tablet Take 1 Tab by mouth every six (6) hours as needed for Pain. Indications: pain    Provider, Historical   pantoprazole (PROTONIX) 40 mg tablet Take 1 Tab by mouth daily. 6/10/20   Elly Burton MD   buPROPion XL (Wellbutrin XL) 300 mg XL tablet Take 300 mg by mouth nightly.     Provider, Historical   SUMAtriptan (IMITREX) 100 mg tablet Take 1 Tab by mouth once as needed for Migraine for up to 1 dose. Indications: Migraine 10/24/17   Ninfa Marvin PA-C   cyclobenzaprine (FLEXERIL) 10 mg tablet TAKE 1 TABLET BY MOUTH THREE TIMES DAILY AS NEEDED FOR MUSCLE SPASM(S)--- 90 day supply  Indications: MUSCLE SPASM 7/28/17   Mali Lal MD   naloxone 4 mg/actuation spry 4 mg by Nasal route as needed for up to 2 doses. Indications: OPIOID TOXICITY 5/2/17   Mali Lal MD     Patient Active Problem List   Diagnosis Code    Hyperlipidemia E78.5    Hypovitaminosis D E55.9    Chronic pain syndrome G89.4    Degeneration of lumbar or lumbosacral intervertebral disc M51.37    Polyarthralgia M25.50    Generalized OA M15.9    Tobacco dependence syndrome F17.200    Peptic ulcer disease K27.9    Anxiety F41.9    Primary hypertension I10    Left breast mass N63.20    Iron deficiency anemia D50.9       Review of Systems   Constitutional: Negative for chills, diaphoresis, fever, malaise/fatigue and weight loss. Respiratory: Negative for cough, hemoptysis, shortness of breath and wheezing. Cardiovascular: Negative for chest pain, palpitations and leg swelling. Gastrointestinal: Negative for abdominal pain, diarrhea, heartburn, nausea and vomiting. Genitourinary: Negative for dysuria, frequency, hematuria and urgency. Musculoskeletal: Negative for joint pain and myalgias. Skin: Negative for itching and rash. Neurological: Negative for dizziness, seizures, weakness and headaches. Psychiatric/Behavioral: Negative for depression. The patient does not have insomnia. Patient-Reported Vitals 10/13/2021   Patient-Reported Weight 135lbs         The patient was advised that our priority at this time is to keep the patients safe, and therefore we are reaching out to patients virtually to prevent them coming into the office unless necessarily. Patient verbalized understanding and was agreeable for virtual visit.     Elizabeth Miller, who was evaluated through a patient-initiated, synchronous (real-time) audio only encounter, and/or her healthcare decision maker, is aware that it is a billable service, with coverage as determined by her insurance carrier. She provided verbal consent to proceed: Yes. She has not had a related appointment within my department in the past 7 days or scheduled within the next 24 hours. I have spent 20 minutes reviewing previous notes, test results and discussing the diagnosis and importance of compliance with the treatment plan as well as documenting on the day of the visit.     Malcolm Alejandro MD

## 2022-02-17 NOTE — PROGRESS NOTES
Eric Allen presents today for   Chief Complaint   Patient presents with    Follow-up       Virtual/telephone visit    Depression Screening:  3 most recent PHQ Screens 2/17/2022   PHQ Not Done -   Little interest or pleasure in doing things Not at all   Feeling down, depressed, irritable, or hopeless Not at all   Total Score PHQ 2 0       Learning Assessment:  Learning Assessment 9/8/2017   PRIMARY LEARNER Patient   BARRIERS PRIMARY LEARNER [address] CAREGIVER No   PRIMARY LANGUAGE ENGLISH   LEARNER PREFERENCE PRIMARY LISTENING   ANSWERED BY patient   RELATIONSHIP SELF       Fall Risk  Fall Risk Assessment, last 12 mths 1/30/2014   Able to walk? Yes   Fall in past 12 months? Yes   Number of falls in past 12 months 1   Fall with injury? 0       ADL  No flowsheet data found. Travel Screening:    Travel Screening     No screening recorded since 02/16/22 0000     Travel History   Travel since 01/17/22    No documented travel since 01/17/22         Health Maintenance reviewed and discussed and ordered per Provider. Health Maintenance Due   Topic Date Due    Cervical cancer screen  07/29/2017    COVID-19 Vaccine (3 - Booster for Pfizer series) 09/14/2021   . Coordination of Care:  1. Have you been to the ER, urgent care clinic since your last visit? Hospitalized since your last visit? no    2. Have you seen or consulted any other health care providers outside of the [address] [place] [address] since your last visit? Include any pap smears or colon screening.  neil

## 2022-03-06 NOTE — PROGRESS NOTES
64 y.o. WHITE/NON- female who presents for evaluation    She continues to go to Dr Jaelyn Leyva for pain mgmt. Still following up with Dr Bhavana Zapata for the anemia     No  or gyn complaints. She has been having intermittent BUQ pain. Not necessarily related to eating, the are paroxysmal and don't last more than 2o min. No associated n/v, change in bh, bleeding. No wt loss. Her bp has been controlled and no cardiovascular complaints    Under a tremendous amount of stress with her mom's illness/dementia and now her dad is ill      Unable to stop smoking, due for LDCT    Past Medical History:   Diagnosis Date    Acne     Dr. Pablo Marc Allergic rhinitis     Dr. Kevin Renee    Anemia, iron deficiency 06/2020    Dr Colleen Mayer; s/p iron infusion Dr Katy Bejarano; has not tried other ssris    Chronic migraine without aura     failed beta blocker, depakote, topamax per pt    Chronic pain     Dr Jaelyn Leyva    Degeneration of cervical intervertebral disc     spinal stenosis    Degenerative arthritis of lumbar spine     Dr Jaelyn Leyva mri 2011 showed degen changes L1-L2 and L3-L4, mild to mod bilat foraminal narrowing    Fibromyalgia syndrome     Ganglion cyst     GERD (gastroesophageal reflux disease)     Hyperlipidemia     calculated 10 year risk score 4.6% (9/15); 4.9% (9/16); 7.1% (1/19); 9% (6/20); 11% (9/20)    Hypertension 04/02/2019    Hypovitaminosis D     PUD (peptic ulcer disease)     Dr. Larry Garrison, duodenal w gi bleed (2011);  Dr Colleen Mayer pyloric ulcer (7/2/20)    Tension headache, chronic     uses fioricet    Tobacco dependence syndrome     declined meds; able to quit on her own but not ready due to stressors; low dose CT neg (1/21)     Past Surgical History:   Procedure Laterality Date    COLONOSCOPY N/A 7/2/2020    Dr Larry Garrison 3/28/11 neg; Dr Colleen Mayer 7/2/20 neg    HX BREAST BIOPSY Left 3/5/2021    LEFT BREAST EXCISIONAL  BIOPSY performed by Delfino Mondragon MD at [address] [place] [address] ENDOSCOPY Dr Yaya Almaraz 10/30/20 hp neg   [employer] Dr Lee Pines; 7 hand surgeries   [facility] Humphrey foot surgery    HX ORTHOPAEDIC  2000s    h/o pelvis fracture    HX OTHER SURGICAL      s/p lipoma resecton     Social History     Socioeconomic History    Marital status:      Spouse name: Not on file    Number of children: 1    Years of education: Not on file    Highest education level: Not on file   Occupational History    Occupation: owns [employer]   Tobacco Use    Smoking status: Current Every Day Smoker     Packs/day: 0.50     Years: 37.00     Pack years: 18.50     Types: Cigarettes    Smokeless tobacco: Never Used   Vaping Use    Vaping Use: Never used   Substance and Sexual Activity    Alcohol use: No    Drug use: Never    Sexual activity: Not Currently   Other Topics Concern    Not on file   Social History Narrative    Not on file     Social Determinants of Health     Financial Resource Strain:     Difficulty of Paying Living Expenses: Not on file   Food Insecurity:     Worried About [facility] in the Last Year: Not on file    Nahid of Food in the Last Year: Not on file   Transportation Needs:     Lack of Transportation (Medical): Not on file    Lack of Transportation (Non-Medical):  Not on file   Physical Activity:     Days of Exercise per Week: Not on file    Minutes of Exercise per Session: Not on file   Stress:     Feeling of Stress : Not on file   Social Connections:     Frequency of Communication with Friends and Family: Not on file    Frequency of Social Gatherings with Friends and Family: Not on file    Attends Yazdanism Services: Not on file    Active Member of Clubs or Organizations: Not on file    Attends Club or Organization Meetings: Not on file    Marital Status: Not on file   Intimate Partner Violence:     Fear of Current or Ex-Partner: Not on file    Emotionally Abused: Not on file    Physically Abused: Not on file  Sexually Abused: Not on file   Housing Stability:     Unable to Pay for Housing in the Last Year: Not on file    Number of Places Lived in the Last Year: Not on file    Unstable Housing in the Last Year: Not on file     Current Outpatient Medications   Medication Sig    amLODIPine (NORVASC) 5 mg tablet TAKE 1 TABLET BY MOUTH EVERY DAY    loratadine (Claritin) 10 mg tablet Take 10 mg by mouth.  Prevalite 4 gram packet TAKE 1 PACKET BY MOUTH TWO (2) TIMES A DAY.  atorvastatin (LIPITOR) 40 mg tablet Take 1 Tab by mouth daily.  butalbital-acetaminophen-caffeine (FIORICET, ESGIC) -40 mg per tablet TAKE 2 TABLETS ORALLY 3 TIMES A DAY FOR CHRONIC HEADACHES.  busPIRone (BUSPAR) 30 mg tablet TAKE 1 TABLET BY MOUTH TWICE A DAY    venlafaxine-SR (EFFEXOR-XR) 75 mg capsule TAKE 3 CAPSULES BY MOUTH EVERY DAY    LORazepam (Ativan) 1 mg tablet Take 1 mg by mouth two (2) times daily as needed for Anxiety.  cholecalciferol (VITAMIN D3) (2,000 UNITS /50 MCG) cap capsule Take  by mouth daily.  HYDROcodone-acetaminophen (NORCO)  mg tablet Take 1 Tab by mouth every six (6) hours as needed for Pain. Indications: pain    pantoprazole (PROTONIX) 40 mg tablet Take 1 Tab by mouth daily.  buPROPion XL (Wellbutrin XL) 300 mg XL tablet Take 300 mg by mouth nightly.  SUMAtriptan (IMITREX) 100 mg tablet Take 1 Tab by mouth once as needed for Migraine for up to 1 dose. Indications: Migraine    cyclobenzaprine (FLEXERIL) 10 mg tablet TAKE 1 TABLET BY MOUTH THREE TIMES DAILY AS NEEDED FOR MUSCLE SPASM(S)--- 90 day supply  Indications: MUSCLE SPASM    naloxone 4 mg/actuation spry 4 mg by Nasal route as needed for up to 2 doses. Indications: OPIOID TOXICITY     No current facility-administered medications for this visit.      Allergies   Allergen Reactions    Aspirin Other (comments)    Nsaids (Non-Steroidal Anti-Inflammatory Drug) Other (comments)     Pt has had bleeding ulcers     REVIEW OF SYSTEMS: mammo 1/21, colo 7/20 Dr Rae Medina  Ophtho  no vision change or eye pain  Oral  no mouth pain, tongue or tooth problems  Ears  no hearing loss, ear pain, fullness, no swallowing problems  Cardiac  no CP, PND, orthopnea, edema, palpitations or syncope  Chest  no breast masses  Resp  no wheezing, chronic coughing, dyspnea  Urinary  no dysuria, hematuria, flank pain, urgency, frequency    Visit Vitals  /70   Pulse 94   Temp 97 °F (36.1 °C) (Temporal)   Resp 16   Ht 5' 4\" (1.626 m)   Wt 138 lb (62.6 kg)   SpO2 98%   BMI 23.69 kg/m²     A&O x3  Affect is appropriate. Mood stable  No apparent distress  Anicteric, no JVD, adenopathy or thyromegaly. Mouth ulcers evident bilaterally  No carotid bruits or radiated murmur  Lungs clear to auscultation, no wheezes or rales  Heart showed regular rate and rhythm. No murmur, rubs, gallops  Abdomen soft nontender, no hepatosplenomegaly or masses. Extremities without edema.   Pulses 1-2+ symmetrically    LABS   From 2/10 showed gluc 88, cr 0.80,             alt 26, chol 255, tg 98,    hdl 68, ldl-c 168, wbc 4.5, hb 13.4, plt 233, tsh 0.92, ua neg  From 1/11 showed gluc 94, cr 0.60, gfr>60,            chol 137, tg 123, hdl 29, ldl-c 83,                       ck/trop neg  From 6/13 showed gluc 95, cr 0.70, gfr>60, alt 17, chol 232, tg 170, hdl 62, ldl-c 136,        ua neg, vit d 23.4  From 9/15 showed gluc 91, cr 0.96, gfr 60,  alt<5,  chol 255, tg 103, hdl 76, ldl-c 158, wbc 6.8, hb 13.2, plt 271,     ua neg, vit d 23.0  From 9/16 showed gluc 87, cr 0.73, gfr 92,  alt 13, chol 254, tg 122, hdl 84, ldl-c 146, wbc 5.6, hb 13.0, plt 270, tsh 1.76, ua neg,          hep c-  From 9/17 showed gluc 86, cr 0.76, gfr>60, alt 15, chol 263, tg 145, hdl 92, ldl-c 142, wbc 5.2, hb 12.2, plt 277,     ua neg, vit d 99.4  From 1/19 showed glu 101, cr 0.88, gfr>60, alt 12, chol 283, tg 183, hdl 76, ldl-c 162,          vit d 25.7  From 6/20 showed gluc 92, cr 0.98, gfr 58,  alt 17, chol 248, tg 135, hdl 79, ldl-c 142, wbc 4.7, hb 6.5,   plt 338, tsh 1.72,     fe 8,         %sat 2,   ferritin 3, b12 263, fol 13.9, spep neg, iris neg  From 7/20 showed               wbc 4.5, hb 8.0,   plt 336,       fe 15,       %sat 3,   ferritin 4  From 9/20 showed               chol 275, tg 113, hdl 82, ldl-c 170, wbc 4.7, hb 11.4, plt 238, hba1c 4.5,     fe 91,       %sat 29,   From 1/21 showed gluc 85, cr 0.75, gfr>60, alt 16, chol 247, tg 126, hdl 94, ldl-c 128, wbc 4.5, hb 11.9, plt 331,       fe 42,       %sat 12  From 3/21 showed                   fe 29,       %sat 8,   ferritin 5, b12 329, fol 4.1,   hapto 162, ldh 152  From 5/21 showed gluc 86, cr 0.69, gfr>60, alt 26, chol 243, tg 158, hdl 70, ldl-c 141, wbc 5.5, hb 12.4, plt 260  From 9/21 showed               chol 179, tg 144, hdl 72, ldl-c 78,               tsh 0.88, ua neg, ft4 0.90    Results for orders placed or performed during the hospital encounter of 32/68/10   METABOLIC PANEL, COMPREHENSIVE   Result Value Ref Range    Sodium 138 136 - 145 mmol/L    Potassium 4.4 3.5 - 5.5 mmol/L    Chloride 108 100 - 111 mmol/L    CO2 29 21 - 32 mmol/L    Anion gap 1 (L) 3.0 - 18 mmol/L    Glucose 95 74 - 99 mg/dL    BUN 11 7.0 - 18 MG/DL    Creatinine 0.77 0.6 - 1.3 MG/DL    BUN/Creatinine ratio 14 12 - 20      GFR est AA >60 >60 ml/min/1.73m2    GFR est non-AA >60 >60 ml/min/1.73m2    Calcium 8.9 8.5 - 10.1 MG/DL    Bilirubin, total 0.3 0.2 - 1.0 MG/DL    ALT (SGPT) 140 (H) 13 - 56 U/L    AST (SGOT) 147 (H) 10 - 38 U/L    Alk.  phosphatase 120 (H) 45 - 117 U/L    Protein, total 6.8 6.4 - 8.2 g/dL    Albumin 3.6 3.4 - 5.0 g/dL    Globulin 3.2 2.0 - 4.0 g/dL    A-G Ratio 1.1 0.8 - 1.7     LIPID PANEL   Result Value Ref Range    LIPID PROFILE          Cholesterol, total 181 <200 MG/DL    Triglyceride 172 (H) <150 MG/DL    HDL Cholesterol 75 (H) 40 - 60 MG/DL    LDL, calculated 71.6 0 - 100 MG/DL    VLDL, calculated 34.4 MG/DL    CHOL/HDL Ratio 2.4 0 - 5.0       We reviewed the patient's labs from the last several visits to point out trends in the numbers          Patient Active Problem List   Diagnosis Code    Hyperlipidemia E78.5    Hypovitaminosis D E55.9    Chronic pain syndrome G89.4    Degeneration of lumbar or lumbosacral intervertebral disc M51.37    Polyarthralgia M25.50    Generalized OA M15.9    Tobacco dependence syndrome F17.200    Peptic ulcer disease K27.9    Anxiety F41.9    Primary hypertension I10    Left breast mass N63.20    Iron deficiency anemia D50.9     Assessment and plan:  1. Allergic rhinitis. Prn meds  2. Chronic pain syndrome. F/U Dr Consuelo Do   3. Anxiety and depression. Continue effexor, buspar and wellbutrin  4. Hyperlipidemia. Hold lipitor with elevated lfts for now, recheck 1 mo  5. H/O PUD and GERD. PPI and avoidance measures indefinitely  6. HTN. Continue amlo and cotnrolled  7. Smoking cessation meds declined. LDCT ordered  8. Vit d def. Supplementation  9. Anemia. Per Dr Royal Celaya  10.  abd pain, transaminasemia. Long discussion. Hold statin above.  sched US and and go from there        RTC 9/22    Above conditions discussed at length and patient vocalized understanding. All questions answered to patient satisfaction        ICD-10-CM ICD-9-CM    1. Abdominal pain, unspecified abdominal location  R10.9 789.00 US ABD COMP   2. Transaminasemia  R74.01 790.4 US ABD COMP      HEPATIC FUNCTION PANEL   3. Peptic ulcer disease  K27.9 533.90    4. Anxiety  F41.9 300.00    5. Chronic pain syndrome  G89.4 338.4    6. Hyperlipidemia, unspecified hyperlipidemia type  E78.5 272.4    7. Other iron deficiency anemia  D50.8 280.8    8. Tobacco dependence syndrome  F17.200 305.1    9. Polyarthralgia  M25.50 719.49    10. Personal history of tobacco use, presenting hazards to health  W18.098 V15.82 CT LOW DOSE LUNG CANCER SCREENING       Discussed with the patient the current USPSTF guidelines released March 9, 2021 for screening for lung cancer. For adults aged 48 to [de-identified] years who have a 20 pack-year smoking history and currently smoke or have quit within the past 15 years the grade B recommendation is to:  Screen for lung cancer with low-dose computed tomography (LDCT) every year. Stop screening once a person has not smoked for 15 years or has a health problem that limits life expectancy or the ability to have lung surgery. The patient has a 37 pack-year history of cigarette smoking and currently is 0.5 ppd. Discussed with patient the risks and benefits of screening, including over-diagnosis, false positive rate, and total radiation exposure. The patient currently exhibits no signs or symptoms suggestive of lung cancer. Discussed with patient the importance of compliance with yearly annual lung cancer screenings and willingness to undergo diagnosis and treatment if screening scan is positive. In addition, the patient was counseled regarding the importance of remaining smoke free and/or total smoking cessation.     Also reviewed the following if the patient has Medicare that as of February 10, 2022, Medicare only covers LDCT screening in patients aged 51-72 with at least a 20 pack-year smoking history who currently smoke or have quit in the last 15 years

## 2022-03-11 ENCOUNTER — APPOINTMENT (OUTPATIENT)
Dept: INTERNAL MEDICINE CLINIC | Age: 62
End: 2022-03-11

## 2022-03-11 ENCOUNTER — HOSPITAL ENCOUNTER (OUTPATIENT)
Dept: LAB | Age: 62
Discharge: HOME | End: 2022-03-11
Payer: COMMERCIAL

## 2022-03-11 DIAGNOSIS — E78.5 HYPERLIPIDEMIA, UNSPECIFIED HYPERLIPIDEMIA TYPE: ICD-10-CM

## 2022-03-11 LAB
ALBUMIN SERPL-MCNC: 3.6 G/DL (ref 3.4–5)
ALBUMIN/GLOB SERPL: 1.1 {RATIO} (ref 0.8–1.7)
ALP SERPL-CCNC: 120 U/L (ref 45–117)
ALT SERPL-CCNC: 140 U/L (ref 13–56)
ANION GAP SERPL CALC-SCNC: 1 MMOL/L (ref 3–18)
AST SERPL-CCNC: 147 U/L (ref 10–38)
BILIRUB SERPL-MCNC: 0.3 MG/DL (ref 0.2–1)
BUN SERPL-MCNC: 11 MG/DL (ref 7–18)
BUN/CREAT SERPL: 14 (ref 12–20)
CALCIUM SERPL-MCNC: 8.9 MG/DL (ref 8.5–10.1)
CHLORIDE SERPL-SCNC: 108 MMOL/L (ref 100–111)
CHOLEST SERPL-MCNC: 181 MG/DL
CO2 SERPL-SCNC: 29 MMOL/L (ref 21–32)
CREAT SERPL-MCNC: 0.77 MG/DL (ref 0.6–1.3)
GLOBULIN SER CALC-MCNC: 3.2 G/DL (ref 2–4)
GLUCOSE SERPL-MCNC: 95 MG/DL (ref 74–99)
HDLC SERPL-MCNC: 75 MG/DL (ref 40–60)
HDLC SERPL: 2.4 {RATIO} (ref 0–5)
LDLC SERPL CALC-MCNC: 71.6 MG/DL (ref 0–100)
LIPID PROFILE,FLP: ABNORMAL
POTASSIUM SERPL-SCNC: 4.4 MMOL/L (ref 3.5–5.5)
PROT SERPL-MCNC: 6.8 G/DL (ref 6.4–8.2)
SODIUM SERPL-SCNC: 138 MMOL/L (ref 136–145)
TRIGL SERPL-MCNC: 172 MG/DL (ref ?–150)
VLDLC SERPL CALC-MCNC: 34.4 MG/DL

## 2022-03-11 PROCEDURE — 36415 COLL VENOUS BLD VENIPUNCTURE: CPT

## 2022-03-11 PROCEDURE — 80061 LIPID PANEL: CPT

## 2022-03-11 PROCEDURE — 80053 COMPREHEN METABOLIC PANEL: CPT

## 2022-03-13 DIAGNOSIS — I10 PRIMARY HYPERTENSION: ICD-10-CM

## 2022-03-14 RX ORDER — AMLODIPINE BESYLATE 5 MG/1
TABLET ORAL
Qty: 90 TABLET | Refills: 3 | Status: SHIPPED | OUTPATIENT
Start: 2022-03-14

## 2022-03-16 ENCOUNTER — PATIENT MESSAGE (OUTPATIENT)
Dept: INTERNAL MEDICINE CLINIC | Age: 62
End: 2022-03-16

## 2022-03-18 ENCOUNTER — OFFICE VISIT (OUTPATIENT)
Dept: INTERNAL MEDICINE CLINIC | Age: 62
End: 2022-03-18
Payer: COMMERCIAL

## 2022-03-18 DIAGNOSIS — K27.9 PEPTIC ULCER DISEASE: ICD-10-CM

## 2022-03-18 DIAGNOSIS — F41.9 ANXIETY: ICD-10-CM

## 2022-03-18 DIAGNOSIS — R10.9 ABDOMINAL PAIN, UNSPECIFIED ABDOMINAL LOCATION: Primary | ICD-10-CM

## 2022-03-18 DIAGNOSIS — Z87.891 PERSONAL HISTORY OF TOBACCO USE, PRESENTING HAZARDS TO HEALTH: ICD-10-CM

## 2022-03-18 DIAGNOSIS — D50.8 OTHER IRON DEFICIENCY ANEMIA: ICD-10-CM

## 2022-03-18 DIAGNOSIS — E78.5 HYPERLIPIDEMIA, UNSPECIFIED HYPERLIPIDEMIA TYPE: ICD-10-CM

## 2022-03-18 DIAGNOSIS — F17.200 TOBACCO DEPENDENCE SYNDROME: ICD-10-CM

## 2022-03-18 DIAGNOSIS — G89.4 CHRONIC PAIN SYNDROME: ICD-10-CM

## 2022-03-18 DIAGNOSIS — M25.50 POLYARTHRALGIA: ICD-10-CM

## 2022-03-18 DIAGNOSIS — R74.01 TRANSAMINASEMIA: ICD-10-CM

## 2022-03-18 PROCEDURE — 99214 OFFICE O/P EST MOD 30 MIN: CPT | Performed by: INTERNAL MEDICINE

## 2022-03-18 PROCEDURE — G0296 VISIT TO DETERM LDCT ELIG: HCPCS | Performed by: INTERNAL MEDICINE

## 2022-03-18 RX ORDER — PREGABALIN 75 MG/1
75 CAPSULE ORAL 2 TIMES DAILY
COMMUNITY
Start: 2022-02-08 | End: 2022-03-20

## 2022-03-18 NOTE — PROGRESS NOTES
Garett Ospinaean presents with   Chief Complaint   Patient presents with    Follow-up     4 month    Cholesterol Problem    Hypertension    Labs     3-11-22            1. \"Have you been to the ER, urgent care clinic since your last visit? Hospitalized since your last visit? \" NO    2. \"Have you seen or consulted any other health care providers outside of the [address] [place] [address] since your last visit? \" NO    3. For patients aged 39-70: Has the patient had a colonoscopy? Yes - no Care Gap present     If the patient is female:    4. For patients aged 41-77: Has the patient had a mammogram within the past 2 years? NA - based on age    11. For patients aged 21-65: Has the patient had a pap smear?  NA - based on age

## 2022-03-19 PROBLEM — N63.20 LEFT BREAST MASS: Status: ACTIVE | Noted: 2021-02-08

## 2022-03-19 PROBLEM — I10 PRIMARY HYPERTENSION: Status: ACTIVE | Noted: 2019-04-02

## 2022-03-20 VITALS
DIASTOLIC BLOOD PRESSURE: 70 MMHG | HEART RATE: 94 BPM | OXYGEN SATURATION: 98 % | SYSTOLIC BLOOD PRESSURE: 132 MMHG | BODY MASS INDEX: 23.56 KG/M2 | TEMPERATURE: 97 F | HEIGHT: 64 IN | RESPIRATION RATE: 16 BRPM | WEIGHT: 138 LBS

## 2022-03-20 PROBLEM — D50.9 IRON DEFICIENCY ANEMIA: Status: ACTIVE | Noted: 2021-03-11

## 2022-03-27 DIAGNOSIS — R10.9 ABDOMINAL PAIN, UNSPECIFIED ABDOMINAL LOCATION: ICD-10-CM

## 2022-03-27 DIAGNOSIS — R74.01 TRANSAMINASEMIA: ICD-10-CM

## 2022-03-28 ENCOUNTER — HOSPITAL ENCOUNTER (OUTPATIENT)
Dept: ULTRASOUND IMAGING | Age: 62
Discharge: HOME | End: 2022-03-28
Attending: INTERNAL MEDICINE
Payer: COMMERCIAL

## 2022-03-28 PROCEDURE — 76700 US EXAM ABDOM COMPLETE: CPT

## 2022-04-01 ENCOUNTER — TELEPHONE (OUTPATIENT)
Dept: INTERNAL MEDICINE CLINIC | Age: 62
End: 2022-04-01

## 2022-04-01 NOTE — TELEPHONE ENCOUNTER
pls call      US shows liver cyst stable in comparison to CT done 2007  No gb or biliary tract disease  Nl pancreas, spleen  Benign kidney cyst    Neg US  Recheck LFTs as discussed for mid April  If sx persist, suggest consult GLST

## 2022-04-03 DIAGNOSIS — Z87.891 PERSONAL HISTORY OF TOBACCO USE, PRESENTING HAZARDS TO HEALTH: ICD-10-CM

## 2022-04-08 ENCOUNTER — HOSPITAL ENCOUNTER (OUTPATIENT)
Dept: CT IMAGING | Age: 62
Discharge: HOME | End: 2022-04-08
Attending: INTERNAL MEDICINE
Payer: COMMERCIAL

## 2022-04-08 VITALS — HEIGHT: 64 IN | BODY MASS INDEX: 22.53 KG/M2 | WEIGHT: 132 LBS

## 2022-04-08 PROCEDURE — 71271 CT THORAX LUNG CANCER SCR C-: CPT

## 2022-04-12 ENCOUNTER — TELEPHONE (OUTPATIENT)
Dept: INTERNAL MEDICINE CLINIC | Age: 62
End: 2022-04-12

## 2022-04-25 ENCOUNTER — APPOINTMENT (OUTPATIENT)
Dept: INTERNAL MEDICINE CLINIC | Age: 62
End: 2022-04-25

## 2022-04-25 ENCOUNTER — TELEPHONE (OUTPATIENT)
Dept: INTERNAL MEDICINE CLINIC | Age: 62
End: 2022-04-25

## 2022-04-25 ENCOUNTER — HOSPITAL ENCOUNTER (OUTPATIENT)
Dept: LAB | Age: 62
Discharge: HOME | End: 2022-04-25
Payer: COMMERCIAL

## 2022-04-25 DIAGNOSIS — R74.01 TRANSAMINASEMIA: ICD-10-CM

## 2022-04-25 LAB
ALBUMIN SERPL-MCNC: 3.8 G/DL (ref 3.4–5)
ALBUMIN/GLOB SERPL: 1.2 {RATIO} (ref 0.8–1.7)
ALP SERPL-CCNC: 86 U/L (ref 45–117)
ALT SERPL-CCNC: 21 U/L (ref 13–56)
AST SERPL-CCNC: 18 U/L (ref 10–38)
BILIRUB DIRECT SERPL-MCNC: <0.1 MG/DL (ref 0–0.2)
BILIRUB SERPL-MCNC: 0.2 MG/DL (ref 0.2–1)
GLOBULIN SER CALC-MCNC: 3.2 G/DL (ref 2–4)
PROT SERPL-MCNC: 7 G/DL (ref 6.4–8.2)

## 2022-04-25 PROCEDURE — 80076 HEPATIC FUNCTION PANEL: CPT

## 2022-04-25 PROCEDURE — 36415 COLL VENOUS BLD VENIPUNCTURE: CPT

## 2022-04-25 NOTE — TELEPHONE ENCOUNTER
Patient stating when she was here last week, Dr. Diona Sacks gave her the names of two different hearing doctors (male and female) and she lost the paper she wrote them down on.

## 2022-05-05 ENCOUNTER — TELEPHONE (OUTPATIENT)
Dept: INTERNAL MEDICINE CLINIC | Age: 62
End: 2022-05-05

## 2022-05-05 DIAGNOSIS — E78.5 HYPERLIPIDEMIA, UNSPECIFIED HYPERLIPIDEMIA TYPE: ICD-10-CM

## 2022-05-05 NOTE — TELEPHONE ENCOUNTER
Patient reached, and she states her symptoms are better, and she thought she was supposed to wait for these results before seeing GI. She wants to know should she schedule with them now, and should she restart her atorvastatin?

## 2022-05-05 NOTE — TELEPHONE ENCOUNTER
pls call    Results for orders placed or performed during the hospital encounter of 04/25/22   HEPATIC FUNCTION PANEL   Result Value Ref Range    Protein, total 7.0 6.4 - 8.2 g/dL    Albumin 3.8 3.4 - 5.0 g/dL    Globulin 3.2 2.0 - 4.0 g/dL    A-G Ratio 1.2 0.8 - 1.7      Bilirubin, total 0.2 0.2 - 1.0 MG/DL    Bilirubin, direct <0.1 0.0 - 0.2 MG/DL    Alk. phosphatase 86 45 - 117 U/L    AST (SGOT) 18 10 - 38 U/L    ALT (SGPT) 21 13 - 56 U/L     lfts back to normal  Are sx better? What did GI tell her?

## 2022-05-09 RX ORDER — ATORVASTATIN CALCIUM 40 MG/1
TABLET, FILM COATED ORAL
Qty: 90 TABLET | Refills: 3 | Status: SHIPPED | OUTPATIENT
Start: 2022-05-09

## 2022-08-09 ENCOUNTER — HOSPITAL ENCOUNTER (OUTPATIENT)
Dept: LAB | Age: 62
Discharge: HOME | End: 2022-08-09
Payer: COMMERCIAL

## 2022-08-09 ENCOUNTER — LAB ONLY (OUTPATIENT)
Dept: ONCOLOGY | Age: 62
End: 2022-08-09

## 2022-08-09 DIAGNOSIS — D50.9 IRON DEFICIENCY ANEMIA, UNSPECIFIED IRON DEFICIENCY ANEMIA TYPE: Primary | ICD-10-CM

## 2022-08-09 DIAGNOSIS — D64.9 NORMOCYTIC ANEMIA: ICD-10-CM

## 2022-08-09 DIAGNOSIS — D50.8 OTHER IRON DEFICIENCY ANEMIA: ICD-10-CM

## 2022-08-09 DIAGNOSIS — D64.9 CHRONIC ANEMIA: ICD-10-CM

## 2022-08-09 DIAGNOSIS — D50.9 IRON DEFICIENCY ANEMIA, UNSPECIFIED IRON DEFICIENCY ANEMIA TYPE: ICD-10-CM

## 2022-08-09 LAB
ALBUMIN SERPL-MCNC: 4 G/DL (ref 3.4–5)
ALBUMIN/GLOB SERPL: 1.3 {RATIO} (ref 0.8–1.7)
ALP SERPL-CCNC: 95 U/L (ref 45–117)
ALT SERPL-CCNC: 21 U/L (ref 13–56)
ANION GAP SERPL CALC-SCNC: 7 MMOL/L (ref 3–18)
AST SERPL-CCNC: 22 U/L (ref 10–38)
BASOPHILS # BLD: 0.1 K/UL (ref 0–0.1)
BASOPHILS NFR BLD: 1 % (ref 0–2)
BILIRUB SERPL-MCNC: 0.4 MG/DL (ref 0.2–1)
BUN SERPL-MCNC: 18 MG/DL (ref 7–18)
BUN/CREAT SERPL: 21 (ref 12–20)
CALCIUM SERPL-MCNC: 8.8 MG/DL (ref 8.5–10.1)
CHLORIDE SERPL-SCNC: 107 MMOL/L (ref 100–111)
CO2 SERPL-SCNC: 26 MMOL/L (ref 21–32)
CREAT SERPL-MCNC: 0.87 MG/DL (ref 0.6–1.3)
DIFFERENTIAL METHOD BLD: ABNORMAL
EOSINOPHIL # BLD: 0 K/UL (ref 0–0.4)
EOSINOPHIL NFR BLD: 1 % (ref 0–5)
ERYTHROCYTE [DISTWIDTH] IN BLOOD BY AUTOMATED COUNT: 12.1 % (ref 11.6–14.5)
FERRITIN SERPL-MCNC: 8 NG/ML (ref 8–388)
GLOBULIN SER CALC-MCNC: 3.2 G/DL (ref 2–4)
GLUCOSE SERPL-MCNC: 84 MG/DL (ref 74–99)
HCT VFR BLD AUTO: 35 % (ref 35–45)
HGB BLD-MCNC: 10.6 G/DL (ref 12–16)
IMM GRANULOCYTES # BLD AUTO: 0 K/UL (ref 0–0.04)
IMM GRANULOCYTES NFR BLD AUTO: 0 % (ref 0–0.5)
IRON SATN MFR SERPL: 7 % (ref 20–50)
IRON SERPL-MCNC: 28 UG/DL (ref 50–175)
LYMPHOCYTES # BLD: 1.4 K/UL (ref 0.9–3.6)
LYMPHOCYTES NFR BLD: 25 % (ref 21–52)
MCH RBC QN AUTO: 29.8 PG (ref 24–34)
MCHC RBC AUTO-ENTMCNC: 30.3 G/DL (ref 31–37)
MCV RBC AUTO: 98.3 FL (ref 78–100)
MONOCYTES # BLD: 0.5 K/UL (ref 0.05–1.2)
MONOCYTES NFR BLD: 8 % (ref 3–10)
NEUTS SEG # BLD: 3.7 K/UL (ref 1.8–8)
NEUTS SEG NFR BLD: 65 % (ref 40–73)
NRBC # BLD: 0 K/UL (ref 0–0.01)
NRBC BLD-RTO: 0 PER 100 WBC
PLATELET # BLD AUTO: 368 K/UL (ref 135–420)
PMV BLD AUTO: 9.8 FL (ref 9.2–11.8)
POTASSIUM SERPL-SCNC: 4.2 MMOL/L (ref 3.5–5.5)
PROT SERPL-MCNC: 7.2 G/DL (ref 6.4–8.2)
RBC # BLD AUTO: 3.56 M/UL (ref 4.2–5.3)
SODIUM SERPL-SCNC: 140 MMOL/L (ref 136–145)
TIBC SERPL-MCNC: 414 UG/DL (ref 250–450)
WBC # BLD AUTO: 5.7 K/UL (ref 4.6–13.2)

## 2022-08-09 PROCEDURE — 83540 ASSAY OF IRON: CPT

## 2022-08-09 PROCEDURE — 85025 COMPLETE CBC W/AUTO DIFF WBC: CPT

## 2022-08-09 PROCEDURE — 82728 ASSAY OF FERRITIN: CPT

## 2022-08-09 PROCEDURE — 36415 COLL VENOUS BLD VENIPUNCTURE: CPT

## 2022-08-09 PROCEDURE — 80053 COMPREHEN METABOLIC PANEL: CPT

## 2022-08-23 ENCOUNTER — VIRTUAL VISIT (OUTPATIENT)
Dept: ONCOLOGY | Age: 62
End: 2022-08-23
Payer: COMMERCIAL

## 2022-08-23 DIAGNOSIS — D64.9 NORMOCYTIC ANEMIA: ICD-10-CM

## 2022-08-23 DIAGNOSIS — D64.9 CHRONIC ANEMIA: ICD-10-CM

## 2022-08-23 DIAGNOSIS — D50.9 IRON DEFICIENCY ANEMIA, UNSPECIFIED IRON DEFICIENCY ANEMIA TYPE: Primary | ICD-10-CM

## 2022-08-23 PROCEDURE — 99214 OFFICE O/P EST MOD 30 MIN: CPT | Performed by: INTERNAL MEDICINE

## 2022-08-23 RX ORDER — NORTRIPTYLINE HYDROCHLORIDE 25 MG/1
CAPSULE ORAL
COMMUNITY
Start: 2022-08-11

## 2022-08-23 NOTE — PROGRESS NOTES
Marek Dougherty is a 64 y.o. female, evaluated via audio-only technology on 8/23/2022 for Follow-up  . Assessment & Plan:   Diagnoses and all orders for this visit:    1. Iron deficiency anemia, unspecified iron deficiency anemia type    2. Normocytic anemia    3. Chronic anemia        # Iron deficiency anemia  # Normocytic Anemia  -- The patient has a past medical history significant of gastritis, mild duodenal deformity, and iron deficiency anemia. -- Lab reviews indicated chronic anemia since at least 1/20/2011 hemoglobin 10.4.  -- 3/8/2021 SPEP/ANTONIO/SFLC reported the immunofixation pattern appears unremarkable. Evidence of   monoclonal protein is not apparent. -- The patient could not tolerate iron pills. She was arranged for IV Injectafer. .  -- 3/25/2021 s.p IV Injectafer x 2, 1 week apart. Tolerated it well.   -- 9/29/2021 CBC reported hemoglobin 11.6, hematocrit 36.8%, .9, WBC 4.9, platelet 449,094. Iron profile showed saturation 21% and ferritin improving to 23.  -- 11/10/2021 S/p Injectafer x 2   -- 2/3/2022 H/H 12.8/40. 1. Iron sat 38%, Ferritin improved to 234.  -- She reported fatigue and low energy level. Today I have reviewed with the patient about recent lab reports. 8/9/2022 H/H 10.6/35. Iron sat 7%, Ferritin 8, TIBC 414    Plan:  -- IV Injectafer x 2   -- Continue lab check CBC, Iron profiles and ferritin post-therapy. -- The patient will f/u with her GI if any further EGD/Colonoscopy indicated. -- We will see the patient back in clinic in about 6 months. Always sooner if required. The patient can have lab done prior to our next clinic visit. 12  Subjective:   Ms. Yusef Vargas is a 64y.o. year old female who was seen for iron deficiency anemia. The patient has a past medical history significant of gastritis, mild duodenal deformity, and iron deficiency anemia. The patient reported she could not tolerate iron pills as it made her sick, nausea, and being tired.   She reported her chronic fatigue and low energy level. The patient Denied fever, chills, night sweat, unintentional weight loss, skin lumps or bumps, acute bleeding or bruising issues. No acute bleeding, blood in stool, dark stool, melena, hematochezia, hemoptysis, dark urine, or easily bruising. Denied headache, acute vision change, dizziness, chest pain, worsen shortness of breath, palpitation, productive cough, nausea, vomiting, abdominal pain, altered bowel habits, dysuria, worsen bone pain or back pain, new focal numbness or weakness. Prior to Admission medications    Medication Sig Start Date End Date Taking? Authorizing Provider   nortriptyline (PAMELOR) 25 mg capsule TAKE 2 CAPSULES ORALLY NIGHTLY FOR NERVE PAIN. 8/11/22   Provider, Historical   atorvastatin (LIPITOR) 40 mg tablet TAKE 1 TABLET BY MOUTH EVERY DAY 5/9/22   Ale Joe MD   amLODIPine (NORVASC) 5 mg tablet TAKE 1 TABLET BY MOUTH EVERY DAY 3/14/22   Ale Joe MD   loratadine (Claritin) 10 mg tablet Take 10 mg by mouth. Provider, Historical   Prevalite 4 gram packet TAKE 1 PACKET BY MOUTH TWO (2) TIMES A DAY. 9/15/21   Ale Joe MD   butalbital-acetaminophen-caffeine (FIORICET, ESGIC) -40 mg per tablet TAKE 2 TABLETS ORALLY 3 TIMES A DAY FOR CHRONIC HEADACHES. 3/4/21   Provider, Historical   busPIRone (BUSPAR) 30 mg tablet TAKE 1 TABLET BY MOUTH TWICE A DAY 1/3/21   Provider, Historical   venlafaxine-SR (EFFEXOR-XR) 75 mg capsule TAKE 3 CAPSULES BY MOUTH EVERY DAY 2/19/21   Provider, Historical   LORazepam (Ativan) 1 mg tablet Take 1 mg by mouth two (2) times daily as needed for Anxiety. Provider, Historical   cholecalciferol (VITAMIN D3) (2,000 UNITS /50 MCG) cap capsule Take  by mouth daily. Provider, Historical   HYDROcodone-acetaminophen (NORCO)  mg tablet Take 1 Tab by mouth every six (6) hours as needed for Pain.  Indications: pain    Provider, Historical   pantoprazole (PROTONIX) 40 mg tablet Take 1 Tab by mouth daily. 6/10/20   Ruth Carmichael MD   buPROPion XL (Wellbutrin XL) 300 mg XL tablet Take 300 mg by mouth nightly. Provider, Historical   SUMAtriptan (IMITREX) 100 mg tablet Take 1 Tab by mouth once as needed for Migraine for up to 1 dose. Indications: Migraine 10/24/17   Tabby Philip PA-C   cyclobenzaprine (FLEXERIL) 10 mg tablet TAKE 1 TABLET BY MOUTH THREE TIMES DAILY AS NEEDED FOR MUSCLE SPASM(S)--- 90 day supply  Indications: MUSCLE SPASM 7/28/17   Josué Howard MD   naloxone 4 mg/actuation spry 4 mg by Nasal route as needed for up to 2 doses. Indications: OPIOID TOXICITY 5/2/17   Josué Howard MD     Patient Active Problem List   Diagnosis Code    Hyperlipidemia E78.5    Hypovitaminosis D E55.9    Chronic pain syndrome G89.4    Degeneration of lumbar or lumbosacral intervertebral disc M51.37    Polyarthralgia M25.50    Generalized OA M15.9    Tobacco dependence syndrome F17.200    Peptic ulcer disease K27.9    Anxiety F41.9    Primary hypertension I10    Left breast mass N63.20    Iron deficiency anemia D50.9       Review of Systems   Constitutional:  Negative for chills, diaphoresis, fever, malaise/fatigue and weight loss. Respiratory:  Negative for cough, hemoptysis, shortness of breath and wheezing. Cardiovascular:  Negative for chest pain, palpitations and leg swelling. Gastrointestinal:  Negative for abdominal pain, diarrhea, heartburn, nausea and vomiting. Genitourinary:  Negative for dysuria, frequency, hematuria and urgency. Musculoskeletal:  Negative for joint pain and myalgias. Skin:  Negative for itching and rash. Neurological:  Negative for dizziness, seizures, weakness and headaches. Psychiatric/Behavioral:  Negative for depression. The patient does not have insomnia.       Patient-Reported Vitals 8/23/2022   Patient-Reported Weight 134lb         The patient was advised that our priority at this time is to keep the patients safe, and therefore we are reaching out to patients virtually to prevent them coming into the office unless necessarily. Patient verbalized understanding and was agreeable for virtual visit. Mey Odell, who was evaluated through a patient-initiated, synchronous (real-time) audio only encounter, and/or her healthcare decision maker, is aware that it is a billable service, with coverage as determined by her insurance carrier. She provided verbal consent to proceed: Yes. She has not had a related appointment within my department in the past 7 days or scheduled within the next 24 hours. I have spent 21 minutes reviewing previous notes, test results and discussing the diagnosis and importance of compliance with the treatment plan as well as documenting on the day of the visit.     Ej Cruz MD        CC: Ari Elias MD

## 2022-08-29 RX ORDER — ACETAMINOPHEN 325 MG/1
650 TABLET ORAL AS NEEDED
Status: CANCELLED
Start: 2022-09-01

## 2022-08-29 RX ORDER — ONDANSETRON 2 MG/ML
8 INJECTION INTRAMUSCULAR; INTRAVENOUS AS NEEDED
Status: CANCELLED | OUTPATIENT
Start: 2022-09-01

## 2022-08-29 RX ORDER — HYDROCORTISONE SODIUM SUCCINATE 100 MG/2ML
100 INJECTION, POWDER, FOR SOLUTION INTRAMUSCULAR; INTRAVENOUS AS NEEDED
Status: CANCELLED | OUTPATIENT
Start: 2022-09-01

## 2022-08-29 RX ORDER — EPINEPHRINE 1 MG/ML
0.3 INJECTION, SOLUTION, CONCENTRATE INTRAVENOUS AS NEEDED
Status: CANCELLED | OUTPATIENT
Start: 2022-09-01

## 2022-08-29 RX ORDER — SODIUM CHLORIDE 9 MG/ML
5-250 INJECTION, SOLUTION INTRAVENOUS AS NEEDED
Status: CANCELLED | OUTPATIENT
Start: 2022-09-01

## 2022-08-29 RX ORDER — ALBUTEROL SULFATE 0.83 MG/ML
2.5 SOLUTION RESPIRATORY (INHALATION) AS NEEDED
Status: CANCELLED
Start: 2022-09-01

## 2022-08-29 RX ORDER — HEPARIN 100 UNIT/ML
500 SYRINGE INTRAVENOUS AS NEEDED
Status: CANCELLED
Start: 2022-09-01

## 2022-08-29 RX ORDER — DIPHENHYDRAMINE HYDROCHLORIDE 50 MG/ML
50 INJECTION, SOLUTION INTRAMUSCULAR; INTRAVENOUS AS NEEDED
Status: CANCELLED
Start: 2022-09-01

## 2022-08-29 RX ORDER — SODIUM CHLORIDE 9 MG/ML
5-40 INJECTION INTRAMUSCULAR; INTRAVENOUS; SUBCUTANEOUS AS NEEDED
Status: CANCELLED | OUTPATIENT
Start: 2022-09-01

## 2022-08-29 RX ORDER — DIPHENHYDRAMINE HYDROCHLORIDE 50 MG/ML
25 INJECTION, SOLUTION INTRAMUSCULAR; INTRAVENOUS AS NEEDED
Status: CANCELLED
Start: 2022-09-01

## 2022-08-31 ENCOUNTER — TELEPHONE (OUTPATIENT)
Dept: INTERNAL MEDICINE CLINIC | Age: 62
End: 2022-08-31

## 2022-08-31 DIAGNOSIS — M25.559 HIP PAIN: Primary | ICD-10-CM

## 2022-08-31 DIAGNOSIS — M25.559 HIP PAIN: ICD-10-CM

## 2022-08-31 DIAGNOSIS — M54.16 LUMBAR RADICULITIS: ICD-10-CM

## 2022-08-31 NOTE — TELEPHONE ENCOUNTER
Pt was here today. Say she was supposed to remind you about her left hip pain. She needs you to order the xray.

## 2022-08-31 NOTE — TELEPHONE ENCOUNTER
Patient reached and given information, verbalized understanding and that she has not had any, would like script sent please.

## 2022-09-01 ENCOUNTER — HOSPITAL ENCOUNTER (OUTPATIENT)
Dept: GENERAL RADIOLOGY | Age: 62
Discharge: HOME | End: 2022-09-01
Payer: COMMERCIAL

## 2022-09-01 ENCOUNTER — HOSPITAL ENCOUNTER (OUTPATIENT)
Dept: INFUSION THERAPY | Age: 62
Discharge: HOME | End: 2022-09-01
Payer: COMMERCIAL

## 2022-09-01 VITALS
RESPIRATION RATE: 18 BRPM | HEART RATE: 86 BPM | OXYGEN SATURATION: 97 % | DIASTOLIC BLOOD PRESSURE: 80 MMHG | SYSTOLIC BLOOD PRESSURE: 134 MMHG | TEMPERATURE: 98.8 F

## 2022-09-01 DIAGNOSIS — D50.8 OTHER IRON DEFICIENCY ANEMIA: Primary | ICD-10-CM

## 2022-09-01 PROCEDURE — 74011250636 HC RX REV CODE- 250/636: Performed by: INTERNAL MEDICINE

## 2022-09-01 PROCEDURE — 96365 THER/PROPH/DIAG IV INF INIT: CPT

## 2022-09-01 PROCEDURE — 73502 X-RAY EXAM HIP UNI 2-3 VIEWS: CPT

## 2022-09-01 PROCEDURE — 74011000250 HC RX REV CODE- 250: Performed by: INTERNAL MEDICINE

## 2022-09-01 RX ORDER — SODIUM CHLORIDE 9 MG/ML
5-250 INJECTION, SOLUTION INTRAVENOUS AS NEEDED
Status: CANCELLED | OUTPATIENT
Start: 2022-09-08

## 2022-09-01 RX ORDER — SODIUM CHLORIDE 9 MG/ML
5-250 INJECTION, SOLUTION INTRAVENOUS AS NEEDED
Status: DISCONTINUED | OUTPATIENT
Start: 2022-09-01 | End: 2022-09-02 | Stop reason: HOSPADM

## 2022-09-01 RX ORDER — ACETAMINOPHEN 325 MG/1
650 TABLET ORAL AS NEEDED
Status: CANCELLED
Start: 2022-09-08

## 2022-09-01 RX ORDER — ALBUTEROL SULFATE 0.83 MG/ML
2.5 SOLUTION RESPIRATORY (INHALATION) AS NEEDED
Status: CANCELLED
Start: 2022-09-08

## 2022-09-01 RX ORDER — SODIUM CHLORIDE 0.9 % (FLUSH) 0.9 %
5-40 SYRINGE (ML) INJECTION AS NEEDED
Status: DISCONTINUED | OUTPATIENT
Start: 2022-09-01 | End: 2022-09-02 | Stop reason: HOSPADM

## 2022-09-01 RX ORDER — DIPHENHYDRAMINE HYDROCHLORIDE 50 MG/ML
50 INJECTION, SOLUTION INTRAMUSCULAR; INTRAVENOUS AS NEEDED
Status: CANCELLED
Start: 2022-09-08

## 2022-09-01 RX ORDER — PREDNISONE 10 MG/1
10 TABLET ORAL SEE ADMIN INSTRUCTIONS
Qty: 21 TABLET | Refills: 0 | Status: SHIPPED | OUTPATIENT
Start: 2022-09-01 | End: 2022-09-20 | Stop reason: ALTCHOICE

## 2022-09-01 RX ORDER — SODIUM CHLORIDE 0.9 % (FLUSH) 0.9 %
5-40 SYRINGE (ML) INJECTION AS NEEDED
Status: CANCELLED | OUTPATIENT
Start: 2022-09-08

## 2022-09-01 RX ORDER — HEPARIN 100 UNIT/ML
500 SYRINGE INTRAVENOUS AS NEEDED
Status: CANCELLED
Start: 2022-09-08

## 2022-09-01 RX ORDER — ONDANSETRON 2 MG/ML
8 INJECTION INTRAMUSCULAR; INTRAVENOUS AS NEEDED
Status: CANCELLED | OUTPATIENT
Start: 2022-09-08

## 2022-09-01 RX ORDER — SODIUM CHLORIDE 9 MG/ML
5-40 INJECTION INTRAMUSCULAR; INTRAVENOUS; SUBCUTANEOUS AS NEEDED
Status: CANCELLED | OUTPATIENT
Start: 2022-09-08

## 2022-09-01 RX ORDER — EPINEPHRINE 1 MG/ML
0.3 INJECTION, SOLUTION, CONCENTRATE INTRAVENOUS AS NEEDED
Status: CANCELLED | OUTPATIENT
Start: 2022-09-08

## 2022-09-01 RX ORDER — HYDROCORTISONE SODIUM SUCCINATE 100 MG/2ML
100 INJECTION, POWDER, FOR SOLUTION INTRAMUSCULAR; INTRAVENOUS AS NEEDED
Status: CANCELLED | OUTPATIENT
Start: 2022-09-08

## 2022-09-01 RX ORDER — DIPHENHYDRAMINE HYDROCHLORIDE 50 MG/ML
25 INJECTION, SOLUTION INTRAMUSCULAR; INTRAVENOUS AS NEEDED
Status: CANCELLED
Start: 2022-09-08

## 2022-09-01 RX ADMIN — SODIUM CHLORIDE 25 ML/HR: 9 INJECTION, SOLUTION INTRAVENOUS at 14:25

## 2022-09-01 RX ADMIN — SODIUM CHLORIDE, PRESERVATIVE FREE 10 ML: 5 INJECTION INTRAVENOUS at 15:21

## 2022-09-01 RX ADMIN — FERRIC CARBOXYMALTOSE INJECTION 750 MG: 50 INJECTION, SOLUTION INTRAVENOUS at 14:28

## 2022-09-01 NOTE — PROGRESS NOTES
[facility] Progress Note    Date: 2022    Name: Kael Long    MRN: [id number]         : 1960    Injectafer Infusion #1 of 2    Ms. Alexx Washburn to [facility], at 1400. Pt was assessed and education was provided. Ms. Singh's vitals were reviewed and patient was observed for 5 minutes prior to treatment. Visit Vitals  /79 (BP 1 Location: Right upper arm, BP Patient Position: Sitting)   Pulse 86   Temp 98.9 °F (37.2 °C)   Resp 18   SpO2 99%   Breastfeeding No         22 g PIV placed in RIGHT x 1 attempt. PIV flushed easily and had brisk blood return.  ml was initiated @ [facility] Lopez throughout treatment. Injectafer 750 mg IV infused over 20 minutes as ordered. Ms. Alexx Washburn tolerated the infusion, and had no complaints. VS remained stable. PIV flushed with NS 10 ml and removed. No bleeding or hematoma noted at site. Guaze and coban applied. Reviewed discharge instructions with patient, including expected side effects (nausea and muscle cramps) and signs of allergic reaction requiring medical attention (itching/hives/rashes, SOB, chest pain, lip/tongue/facial swelling). Patient given printed copy to take home. Patient verbalized understanding of discharge instructions. Patient stayed for 30 minute observation, no signs or symptoms of reaction noted. Patient armband removed and shredded. Ms. Alexx Washburn was discharged from [place] [address] in stable condition at 1525. She is to return on 22 at 1400 for Injectafer #2 of 2 appointment.       Mitra Kelly  2022

## 2022-09-04 ENCOUNTER — TELEPHONE (OUTPATIENT)
Dept: INTERNAL MEDICINE CLINIC | Age: 62
End: 2022-09-04

## 2022-09-06 NOTE — TELEPHONE ENCOUNTER
I called the patient and advised. Patient verbalized understanding and states she is feeling better and her pain is subsiding.

## 2022-09-08 ENCOUNTER — HOSPITAL ENCOUNTER (OUTPATIENT)
Dept: INFUSION THERAPY | Age: 62
Discharge: HOME | End: 2022-09-08
Payer: COMMERCIAL

## 2022-09-08 VITALS
TEMPERATURE: 97.9 F | HEART RATE: 99 BPM | SYSTOLIC BLOOD PRESSURE: 118 MMHG | RESPIRATION RATE: 18 BRPM | OXYGEN SATURATION: 97 % | DIASTOLIC BLOOD PRESSURE: 68 MMHG

## 2022-09-08 DIAGNOSIS — D50.8 OTHER IRON DEFICIENCY ANEMIA: Primary | ICD-10-CM

## 2022-09-08 PROCEDURE — 74011250636 HC RX REV CODE- 250/636: Performed by: INTERNAL MEDICINE

## 2022-09-08 PROCEDURE — 74011000250 HC RX REV CODE- 250: Performed by: INTERNAL MEDICINE

## 2022-09-08 PROCEDURE — 96365 THER/PROPH/DIAG IV INF INIT: CPT

## 2022-09-08 RX ORDER — SODIUM CHLORIDE 9 MG/ML
5-250 INJECTION, SOLUTION INTRAVENOUS AS NEEDED
Status: CANCELLED | OUTPATIENT
Start: 2022-09-15

## 2022-09-08 RX ORDER — ACETAMINOPHEN 325 MG/1
650 TABLET ORAL AS NEEDED
Start: 2022-09-15

## 2022-09-08 RX ORDER — HEPARIN 100 UNIT/ML
500 SYRINGE INTRAVENOUS AS NEEDED
Start: 2022-09-15

## 2022-09-08 RX ORDER — SODIUM CHLORIDE 0.9 % (FLUSH) 0.9 %
5-40 SYRINGE (ML) INJECTION AS NEEDED
Status: DISCONTINUED | OUTPATIENT
Start: 2022-09-08 | End: 2022-09-09 | Stop reason: HOSPADM

## 2022-09-08 RX ORDER — SODIUM CHLORIDE 9 MG/ML
5-250 INJECTION, SOLUTION INTRAVENOUS AS NEEDED
Status: DISCONTINUED | OUTPATIENT
Start: 2022-09-08 | End: 2022-09-09 | Stop reason: HOSPADM

## 2022-09-08 RX ORDER — SODIUM CHLORIDE 0.9 % (FLUSH) 0.9 %
5-40 SYRINGE (ML) INJECTION AS NEEDED
OUTPATIENT
Start: 2022-09-15

## 2022-09-08 RX ORDER — HYDROCORTISONE SODIUM SUCCINATE 100 MG/2ML
100 INJECTION, POWDER, FOR SOLUTION INTRAMUSCULAR; INTRAVENOUS AS NEEDED
OUTPATIENT
Start: 2022-09-15

## 2022-09-08 RX ORDER — DIPHENHYDRAMINE HYDROCHLORIDE 50 MG/ML
50 INJECTION, SOLUTION INTRAMUSCULAR; INTRAVENOUS AS NEEDED
Start: 2022-09-15

## 2022-09-08 RX ORDER — ALBUTEROL SULFATE 0.83 MG/ML
2.5 SOLUTION RESPIRATORY (INHALATION) AS NEEDED
Start: 2022-09-15

## 2022-09-08 RX ORDER — SODIUM CHLORIDE 9 MG/ML
5-40 INJECTION INTRAMUSCULAR; INTRAVENOUS; SUBCUTANEOUS AS NEEDED
OUTPATIENT
Start: 2022-09-15

## 2022-09-08 RX ORDER — ONDANSETRON 2 MG/ML
8 INJECTION INTRAMUSCULAR; INTRAVENOUS AS NEEDED
OUTPATIENT
Start: 2022-09-15

## 2022-09-08 RX ORDER — SODIUM CHLORIDE 9 MG/ML
5-250 INJECTION, SOLUTION INTRAVENOUS AS NEEDED
OUTPATIENT
Start: 2022-09-15

## 2022-09-08 RX ORDER — DIPHENHYDRAMINE HYDROCHLORIDE 50 MG/ML
25 INJECTION, SOLUTION INTRAMUSCULAR; INTRAVENOUS AS NEEDED
Start: 2022-09-15

## 2022-09-08 RX ORDER — EPINEPHRINE 1 MG/ML
0.3 INJECTION, SOLUTION, CONCENTRATE INTRAVENOUS AS NEEDED
OUTPATIENT
Start: 2022-09-15

## 2022-09-08 RX ADMIN — SODIUM CHLORIDE, PRESERVATIVE FREE 10 ML: 5 INJECTION INTRAVENOUS at 14:45

## 2022-09-08 RX ADMIN — FERRIC CARBOXYMALTOSE INJECTION 750 MG: 50 INJECTION, SOLUTION INTRAVENOUS at 14:16

## 2022-09-08 RX ADMIN — SODIUM CHLORIDE, PRESERVATIVE FREE 10 ML: 5 INJECTION INTRAVENOUS at 14:14

## 2022-09-08 NOTE — PROGRESS NOTES
[facility] Progress Note    Date: 2022    Name: Venu Carmona    MRN: [id number]         : 1960    Injectafer Infusion #2 of 2    Ms. Amanda Wilkins to [facility], [facility], at 1400. Pt was assessed and education was provided. Ms. Singh's vitals were reviewed and patient was observed for 5 minutes prior to treatment. Visit Vitals  /68 (BP 1 Location: Right upper arm, BP Patient Position: Sitting)   Pulse 99   Temp 98.1 °F (36.7 °C)   Resp 18   SpO2 98%         22 g PIV placed in RIGHT x 1 attempt. PIV flushed easily and had brisk blood return. Injectafer 750 mg IV infused over 20 minutes as ordered. Ms. Amanda Wilkins tolerated the infusion, and had no complaints. VS remained stable. PIV flushed with NS 10 ml and removed. No bleeding or hematoma noted at site. Guaze and coban applied. Patient armband removed and shredded. Ms. Amanda Wilkins was discharged from [place] [address] in stable condition at 1450. Patient completed Injectafer series. No future appointment for infusion at this time.        Felecia Caro, RN,RN  2022

## 2022-09-13 ENCOUNTER — LAB ONLY (OUTPATIENT)
Dept: INTERNAL MEDICINE CLINIC | Age: 62
End: 2022-09-13

## 2022-09-13 ENCOUNTER — HOSPITAL ENCOUNTER (OUTPATIENT)
Dept: LAB | Age: 62
Discharge: HOME | End: 2022-09-13
Payer: COMMERCIAL

## 2022-09-13 DIAGNOSIS — Z09 FOLLOW-UP EXAM: ICD-10-CM

## 2022-09-13 DIAGNOSIS — I10 PRIMARY HYPERTENSION: ICD-10-CM

## 2022-09-13 DIAGNOSIS — E78.5 HYPERLIPIDEMIA, UNSPECIFIED HYPERLIPIDEMIA TYPE: ICD-10-CM

## 2022-09-13 DIAGNOSIS — Z09 FOLLOW-UP EXAM: Primary | ICD-10-CM

## 2022-09-13 LAB
CHOLEST SERPL-MCNC: 174 MG/DL
HDLC SERPL-MCNC: 74 MG/DL (ref 40–60)
HDLC SERPL: 2.4 {RATIO} (ref 0–5)
LDLC SERPL CALC-MCNC: 73.4 MG/DL (ref 0–100)
LIPID PROFILE,FLP: ABNORMAL
T4 FREE SERPL-MCNC: 0.7 NG/DL (ref 0.7–1.5)
TRIGL SERPL-MCNC: 133 MG/DL (ref ?–150)
TSH SERPL DL<=0.05 MIU/L-ACNC: 0.75 UIU/ML (ref 0.36–3.74)
VLDLC SERPL CALC-MCNC: 26.6 MG/DL

## 2022-09-13 PROCEDURE — 36415 COLL VENOUS BLD VENIPUNCTURE: CPT

## 2022-09-13 PROCEDURE — 84439 ASSAY OF FREE THYROXINE: CPT

## 2022-09-13 PROCEDURE — 80061 LIPID PANEL: CPT

## 2022-09-13 NOTE — PROGRESS NOTES
58 y.o. WHITE/NON- female who presents for evaluation    She continues to go to Dr Marielle Kinney for pain mgmt. Still following up with Dr Noemy Suarez for the anemia and she has received iron infusion and scheduled for another series possibly in Feb?  No bleeding from anywhere to report    No  or gyn complaints    Her bp has been controlled and no cardiovascular complaints    She had transient elevated of her lfts back in April, US neg and numbers normalized so back on statin since May or so and lfts ok in August.  No issues to report    Past Medical History:   Diagnosis Date    Acne     Dr. Ordonez Comment    Allergic rhinitis     Dr. Jersey Oliver    Anemia, iron deficiency 06/2020    Dr Jensen Mueller; s/p iron infusion Dr Troy Rasheed; has not tried other ssris    Chronic migraine without aura     failed beta blocker, depakote, topamax per pt    Chronic pain     Dr Marielle Kinney    Degenerative arthritis of cervical spine     spinal stenosis    Degenerative arthritis of lumbar spine     Dr Sara Liz 2011 showed degen changes L1-L2 and L3-L4, mild to mod bilat foraminal narrowing    Fibromyalgia syndrome     Ganglion cyst     GERD (gastroesophageal reflux disease)     Hyperlipidemia     calculated 10 year risk score 4.6% (9/15); 4.9% (9/16); 7.1% (1/19); 9% (6/20); 11% (9/20)    Hypertension 04/02/2019    PUD (peptic ulcer disease)     Dr. Frances Stewart, duodenal w gi bleed (2011);  Dr Jensen Mueller pyloric ulcer (7/2/20)    Tension headache, chronic     uses fioricet    Tobacco dependence syndrome     declined meds and unable to stop due to stressors; LDCT neg (1/21), (4/22)    Vitamin D deficiency      Past Surgical History:   Procedure Laterality Date    COLONOSCOPY N/A 7/2/2020    Dr Frances Stewart 3/28/11 neg; Dr Jensen Mueller 7/2/20 neg    HX BREAST BIOPSY Left 3/5/2021    LEFT BREAST EXCISIONAL  BIOPSY performed by Vandana Frances MD at [place] [address]      Dr Jensen Mueller 10/30/20 hp neg    Corita Eladio      Dr Godwin Urena; 7 hand surgeries [facility] foot surgery    HX ORTHOPAEDIC  2000s    h/o pelvis fracture    HX OTHER SURGICAL      s/p lipoma resecton     Social History     Socioeconomic History    Marital status:      Spouse name: Not on file    Number of children: 1    Years of education: Not on file    Highest education level: Not on file   Occupational History    Occupation: owns [employer]   Tobacco Use    Smoking status: Every Day     Packs/day: 0.50     Years: 37.00     Pack years: 18.50     Types: Cigarettes    Smokeless tobacco: Never   Vaping Use    Vaping Use: Never used   Substance and Sexual Activity    Alcohol use: No    Drug use: Never    Sexual activity: Not Currently   Other Topics Concern    Not on file   Social History Narrative    Not on file     Social Determinants of Health     Financial Resource Strain: Not on file   Food Insecurity: Not on file   Transportation Needs: Not on file   Physical Activity: Not on file   Stress: Not on file   Social Connections: Not on file   Intimate Partner Violence: Not on file   Housing Stability: Not on file     Current Outpatient Medications   Medication Sig    nortriptyline (PAMELOR) 25 mg capsule TAKE 2 CAPSULES ORALLY NIGHTLY FOR NERVE PAIN. atorvastatin (LIPITOR) 40 mg tablet TAKE 1 TABLET BY MOUTH EVERY DAY    amLODIPine (NORVASC) 5 mg tablet TAKE 1 TABLET BY MOUTH EVERY DAY    loratadine (CLARITIN) 10 mg tablet Take 10 mg by mouth. butalbital-acetaminophen-caffeine (FIORICET, ESGIC) -40 mg per tablet TAKE 2 TABLETS ORALLY 3 TIMES A DAY FOR CHRONIC HEADACHES.    busPIRone (BUSPAR) 30 mg tablet TAKE 1 TABLET BY MOUTH TWICE A DAY    venlafaxine-SR (EFFEXOR-XR) 75 mg capsule TAKE 3 CAPSULES BY MOUTH EVERY DAY    LORazepam (ATIVAN) 1 mg tablet Take 1 mg by mouth two (2) times daily as needed for Anxiety. HYDROcodone-acetaminophen (NORCO)  mg tablet Take 1 Tab by mouth every six (6) hours as needed for Pain.  Indications: pain pantoprazole (PROTONIX) 40 mg tablet Take 1 Tab by mouth daily. buPROPion XL (WELLBUTRIN XL) 300 mg XL tablet Take 300 mg by mouth nightly. SUMAtriptan (IMITREX) 100 mg tablet Take 1 Tab by mouth once as needed for Migraine for up to 1 dose. Indications: Migraine    cyclobenzaprine (FLEXERIL) 10 mg tablet TAKE 1 TABLET BY MOUTH THREE TIMES DAILY AS NEEDED FOR MUSCLE SPASM(S)--- 90 day supply  Indications: MUSCLE SPASM    naloxone 4 mg/actuation spry 4 mg by Nasal route as needed for up to 2 doses. Indications: OPIOID TOXICITY     No current facility-administered medications for this visit. Allergies   Allergen Reactions    Aspirin Other (comments)    Nsaids (Non-Steroidal Anti-Inflammatory Drug) Other (comments)     Pt has had bleeding ulcers     REVIEW OF SYSTEMS: mammo 1/21, colo 7/20 Dr Tayler Tanner  Ophtho - no vision change or eye pain  Oral - no mouth pain, tongue or tooth problems  Ears - no hearing loss, ear pain, fullness, no swallowing problems  Cardiac - no CP, PND, orthopnea, edema, palpitations or syncope  Chest - no breast masses  Resp - no wheezing, chronic coughing, dyspnea  Urinary - no dysuria, hematuria, flank pain, urgency, frequency    Visit Vitals  /72   Pulse 100   Temp 98 °F (36.7 °C) (Temporal)   Resp 16   Ht 5' 4\" (1.626 m)   Wt 134 lb (60.8 kg)   SpO2 98%   BMI 23.00 kg/m²       A&O x3  Affect is appropriate. Mood stable  No apparent distress  Anicteric, no JVD, adenopathy or thyromegaly. Mouth ulcers evident bilaterally  No carotid bruits or radiated murmur  Lungs clear to auscultation, no wheezes or rales  Heart showed regular rate and rhythm. No murmur, rubs, gallops  Abdomen soft nontender, no hepatosplenomegaly or masses. Extremities without edema.   Pulses 1-2+ symmetrically    LABS   From 2/10 showed gluc 88, cr 0.80,             alt 26,   chol 255, tg 98    hdl 68, ldl-c 168, wbc 4.5, hb 13.4, plt 233, tsh 0.92, ua neg  From 1/11 showed gluc 94, cr 0.60, gfr>60, chol 137, tg 123, hdl 29, ldl-c 83,                         ck/trop-  From 6/13 showed gluc 95, cr 0.70, gfr>60, alt 17,   chol 232, tg 170, hdl 62, ldl-c 136,           ua neg, vit d 23.4  From 9/15 showed gluc 91, cr 0.96, gfr 60,  alt<5,    chol 255, tg 103, hdl 76, ldl-c 158, wbc 6.8, hb 13.2, plt 271,       ua neg, vit d 23.0  From 9/16 showed gluc 87, cr 0.73, gfr 92,  alt 13,   chol 254, tg 122, hdl 84, ldl-c 146, wbc 5.6, hb 13.0, plt 270, tsh 1.76, ua neg,            hep c-  From 9/17 showed gluc 86, cr 0.76, gfr>60, alt 15,   chol 263, tg 145, hdl 92, ldl-c 142, wbc 5.2, hb 12.2, plt 277,       ua neg, vit d 99.4  From 1/19 showed glu 101, cr 0.88, gfr>60, alt 12,   chol 283, tg 183, hdl 76, ldl-c 162,             vit d 25.7  From 6/20 showed gluc 92, cr 0.98, gfr 58,  alt 17,   chol 248, tg 135, hdl 79, ldl-c 142, wbc 4.7, hb 6.5,   plt 338, tsh 1.72,                fe 8,   %sat 2,  ferritin 3, b12 263, fol 13.9, spep neg, iris neg  From 7/20 showed                  wbc 4.5, hb 8.0,   plt 336,              fe 15,  %sat 3,  ferritin 4  From 9/20 showed                 chol 275, tg 113, hdl 82, ldl-c 170, wbc 4.7, hb 11.4, plt 238, hba1c 4.5,                fe 91, %sat 29,   From 1/21 showed gluc 85, cr 0.75, gfr>60, alt 16,   chol 247, tg 126, hdl 94, ldl-c 128, wbc 4.5, hb 11.9, plt 331,              fe 42, %sat 12  From 3/21 showed                             fe 29, %sat 8,  ferritin 5, b12 329, fol 4.1, hapto 162, ldh 152  From 5/21 showed gluc 86, cr 0.69, gfr>60, alt 26,   chol 243, tg 158, hdl 70, ldl-c 141, wbc 5.5, hb 12.4, plt 260  From 9/21 showed                 chol 179, tg 144, hdl 72, ldl-c 78,                 tsh 0.88, ua neg, ft4 0.90  From 3/22 showed gluc 96, cr 0.77, gfr>60, alt 140, chol 181, tg 172, hdl 75, ldl-c 72,                        ast 147, ap 120, tb 0.3  From 4/22 showed     alt 21,                   ast 18,   ap 86, tb 0.2  From 8/22 showed           cr 0.87,   alt 21, wbc 5.7, hb 10.6, plt 368,              fe 7,  %sat 28, ferritin 8, ast 22,   ap 95, tb 0.4    Results for orders placed or performed during the hospital encounter of 09/13/22   LIPID PANEL   Result Value Ref Range    LIPID PROFILE          Cholesterol, total 174 <200 MG/DL    Triglyceride 133 <150 MG/DL    HDL Cholesterol 74 (H) 40 - 60 MG/DL    LDL, calculated 73.4 0 - 100 MG/DL    VLDL, calculated 26.6 MG/DL    CHOL/HDL Ratio 2.4 0 - 5.0     TSH AND FREE T4   Result Value Ref Range    TSH 0.75 0.36 - 3.74 uIU/mL    T4, Free 0.7 0.7 - 1.5 NG/DL     We reviewed the patient's labs from the last several visits to point out trends in the numbers          Patient Active Problem List   Diagnosis Code    Hyperlipidemia E78.5    Hypovitaminosis D E55.9    Chronic pain syndrome G89.4    Degeneration of lumbar or lumbosacral intervertebral disc M51.37    Polyarthralgia M25.50    Generalized OA M15.9    Tobacco dependence syndrome F17.200    Peptic ulcer disease K27.9    Anxiety F41.9    Primary hypertension I10    Left breast mass N63.20    Iron deficiency anemia D50.9     Assessment and plan:  1. Allergic rhinitis. Prn meds  2. Chronic pain syndrome. F/U Dr Blanquita Hurt   3. Anxiety and depression. Continue effexor, buspar and wellbutrin  4. Hyperlipidemia. At target on lipitor  5. H/O PUD and GERD. PPI and avoidance measures indefinitely  6. HTN. Continue amlo and controlled  7. Smoking cessation meds declined. LDCT ordered  8. Vit d def. Recheck next draw  9. Anemia. Per Dr Juanis Chopra          RTC 3/23    Above conditions discussed at length and patient vocalized understanding. All questions answered to patient satisfaction        ICD-10-CM ICD-9-CM    1. Hyperlipidemia, unspecified hyperlipidemia type  C09.4 754.9 METABOLIC PANEL, COMPREHENSIVE      LIPID PANEL      2. Hypovitaminosis D  E55.9 268.9 VITAMIN D, 25 HYDROXY      3. Other iron deficiency anemia  D50.8 280.8       4.  Primary hypertension  I10 401.9       5.  Screening mammogram for breast cancer  Z12.31 V76.12 [facility] MAMMO BI SCREENING INCL CAD

## 2022-09-20 ENCOUNTER — OFFICE VISIT (OUTPATIENT)
Dept: INTERNAL MEDICINE CLINIC | Age: 62
End: 2022-09-20
Payer: COMMERCIAL

## 2022-09-20 VITALS
SYSTOLIC BLOOD PRESSURE: 135 MMHG | RESPIRATION RATE: 16 BRPM | HEIGHT: 64 IN | TEMPERATURE: 98 F | WEIGHT: 134 LBS | HEART RATE: 100 BPM | OXYGEN SATURATION: 98 % | BODY MASS INDEX: 22.88 KG/M2 | DIASTOLIC BLOOD PRESSURE: 72 MMHG

## 2022-09-20 DIAGNOSIS — E55.9 HYPOVITAMINOSIS D: ICD-10-CM

## 2022-09-20 DIAGNOSIS — Z12.31 SCREENING MAMMOGRAM FOR BREAST CANCER: ICD-10-CM

## 2022-09-20 DIAGNOSIS — E78.5 HYPERLIPIDEMIA, UNSPECIFIED HYPERLIPIDEMIA TYPE: Primary | ICD-10-CM

## 2022-09-20 DIAGNOSIS — D50.8 OTHER IRON DEFICIENCY ANEMIA: ICD-10-CM

## 2022-09-20 DIAGNOSIS — I10 PRIMARY HYPERTENSION: ICD-10-CM

## 2022-09-20 PROCEDURE — 99214 OFFICE O/P EST MOD 30 MIN: CPT | Performed by: INTERNAL MEDICINE

## 2023-01-09 DIAGNOSIS — I10 PRIMARY HYPERTENSION: ICD-10-CM

## 2023-01-09 RX ORDER — AMLODIPINE BESYLATE 5 MG/1
TABLET ORAL
Qty: 90 TABLET | Refills: 3 | Status: SHIPPED | OUTPATIENT
Start: 2023-01-09

## 2023-01-31 DIAGNOSIS — K92.2 GASTROINTESTINAL HEMORRHAGE, UNSPECIFIED GASTROINTESTINAL HEMORRHAGE TYPE: ICD-10-CM

## 2023-01-31 RX ORDER — PANTOPRAZOLE SODIUM 40 MG/1
40 TABLET, DELAYED RELEASE ORAL DAILY
Qty: 180 TABLET | Refills: 3 | Status: CANCELLED | OUTPATIENT
Start: 2023-01-31

## 2023-02-01 RX ORDER — PANTOPRAZOLE SODIUM 40 MG/1
40 TABLET, DELAYED RELEASE ORAL DAILY
Qty: 180 TABLET | Refills: 3 | Status: SHIPPED | OUTPATIENT
Start: 2023-02-01

## 2023-02-01 NOTE — TELEPHONE ENCOUNTER
Last OV: 9/20/2022  Next OV: 3/28/2023  Last refill: 6/10/2020    Patient states she usually gets this from Dr. Denia Wolf but his office has not responded to her request.

## 2023-02-05 DIAGNOSIS — E78.5 HYPERLIPIDEMIA, UNSPECIFIED HYPERLIPIDEMIA TYPE: Primary | ICD-10-CM

## 2023-02-06 DIAGNOSIS — E55.9 HYPOVITAMINOSIS D: Primary | ICD-10-CM

## 2023-02-14 ENCOUNTER — HOSPITAL ENCOUNTER (OUTPATIENT)
Facility: HOSPITAL | Age: 63
Setting detail: SPECIMEN
Discharge: HOME | End: 2023-02-17
Payer: COMMERCIAL

## 2023-02-14 LAB
ALBUMIN SERPL-MCNC: 3.9 G/DL (ref 3.4–5)
ALBUMIN/GLOB SERPL: 1.1 (ref 0.8–1.7)
ALP SERPL-CCNC: 111 U/L (ref 45–117)
ALT SERPL-CCNC: 53 U/L (ref 13–56)
ANION GAP SERPL CALC-SCNC: 3 MMOL/L (ref 3–18)
AST SERPL-CCNC: 59 U/L (ref 10–38)
BASOPHILS # BLD: 0 K/UL (ref 0–0.1)
BASOPHILS NFR BLD: 1 % (ref 0–2)
BILIRUB SERPL-MCNC: 0.2 MG/DL (ref 0.2–1)
BUN SERPL-MCNC: 12 MG/DL (ref 7–18)
BUN/CREAT SERPL: 17 (ref 12–20)
CALCIUM SERPL-MCNC: 9.1 MG/DL (ref 8.5–10.1)
CHLORIDE SERPL-SCNC: 107 MMOL/L (ref 100–111)
CO2 SERPL-SCNC: 29 MMOL/L (ref 21–32)
CREAT SERPL-MCNC: 0.71 MG/DL (ref 0.6–1.3)
DIFFERENTIAL METHOD BLD: ABNORMAL
EOSINOPHIL # BLD: 0.1 K/UL (ref 0–0.4)
EOSINOPHIL NFR BLD: 2 % (ref 0–5)
ERYTHROCYTE [DISTWIDTH] IN BLOOD BY AUTOMATED COUNT: 11.6 % (ref 11.6–14.5)
FERRITIN SERPL-MCNC: 45 NG/ML (ref 8–388)
GLOBULIN SER CALC-MCNC: 3.4 G/DL (ref 2–4)
GLUCOSE SERPL-MCNC: 86 MG/DL (ref 74–99)
HCT VFR BLD AUTO: 41 % (ref 35–45)
HGB BLD-MCNC: 13.4 G/DL (ref 12–16)
IMM GRANULOCYTES # BLD AUTO: 0 K/UL (ref 0–0.04)
IMM GRANULOCYTES NFR BLD AUTO: 0 % (ref 0–0.5)
IRON SATN MFR SERPL: 25 % (ref 20–50)
IRON SERPL-MCNC: 77 UG/DL (ref 50–175)
LYMPHOCYTES # BLD: 1.6 K/UL (ref 0.9–3.6)
LYMPHOCYTES NFR BLD: 38 % (ref 21–52)
MCH RBC QN AUTO: 33.4 PG (ref 24–34)
MCHC RBC AUTO-ENTMCNC: 32.7 G/DL (ref 31–37)
MCV RBC AUTO: 102.2 FL (ref 78–100)
MONOCYTES # BLD: 0.4 K/UL (ref 0.05–1.2)
MONOCYTES NFR BLD: 10 % (ref 3–10)
NEUTS SEG # BLD: 2 K/UL (ref 1.8–8)
NEUTS SEG NFR BLD: 49 % (ref 40–73)
NRBC # BLD: 0 K/UL (ref 0–0.01)
NRBC BLD-RTO: 0 PER 100 WBC
PLATELET # BLD AUTO: 257 K/UL (ref 135–420)
PMV BLD AUTO: 10.2 FL (ref 9.2–11.8)
POTASSIUM SERPL-SCNC: 4.4 MMOL/L (ref 3.5–5.5)
PROT SERPL-MCNC: 7.3 G/DL (ref 6.4–8.2)
RBC # BLD AUTO: 4.01 M/UL (ref 4.2–5.3)
SODIUM SERPL-SCNC: 139 MMOL/L (ref 136–145)
TIBC SERPL-MCNC: 305 UG/DL (ref 250–450)
WBC # BLD AUTO: 4.2 K/UL (ref 4.6–13.2)

## 2023-02-14 PROCEDURE — 36415 COLL VENOUS BLD VENIPUNCTURE: CPT

## 2023-02-14 PROCEDURE — 82728 ASSAY OF FERRITIN: CPT

## 2023-02-14 PROCEDURE — 80053 COMPREHEN METABOLIC PANEL: CPT

## 2023-02-14 PROCEDURE — 85025 COMPLETE CBC W/AUTO DIFF WBC: CPT

## 2023-02-14 PROCEDURE — 83550 IRON BINDING TEST: CPT

## 2023-02-23 ENCOUNTER — OFFICE VISIT (OUTPATIENT)
Age: 63
End: 2023-02-23
Payer: COMMERCIAL

## 2023-02-23 VITALS
DIASTOLIC BLOOD PRESSURE: 82 MMHG | OXYGEN SATURATION: 99 % | HEART RATE: 94 BPM | RESPIRATION RATE: 16 BRPM | BODY MASS INDEX: 23.39 KG/M2 | HEIGHT: 64 IN | SYSTOLIC BLOOD PRESSURE: 120 MMHG | WEIGHT: 137 LBS

## 2023-02-23 DIAGNOSIS — D50.9 IRON DEFICIENCY ANEMIA, UNSPECIFIED IRON DEFICIENCY ANEMIA TYPE: Primary | ICD-10-CM

## 2023-02-23 PROCEDURE — 3079F DIAST BP 80-89 MM HG: CPT | Performed by: INTERNAL MEDICINE

## 2023-02-23 PROCEDURE — 99213 OFFICE O/P EST LOW 20 MIN: CPT | Performed by: INTERNAL MEDICINE

## 2023-02-23 PROCEDURE — 3074F SYST BP LT 130 MM HG: CPT | Performed by: INTERNAL MEDICINE

## 2023-02-23 NOTE — PROGRESS NOTES
Hematology/Oncology Note      Date: 23      Name: Lobo Schaefer  : 1960        PCP: Alexa Barboza MD          Subjective:     Chief complaint: Iron deficiency anemia    History of Present Illness:   Ms. Bowen Neal is a most pleasant 58 y.o. female who was seen for iron deficiency anemia. The patient reported she could not tolerate iron pills as it made her sick, nausea, and being tired. The patient otherwise has no other complaints. Denied fever, chills, night sweat, unintentional weight loss, skin lumps or bumps, acute bleeding or bruising issues. No acute bleeding, blood in stool, dark stool, melena, hematochezia, hemoptysis, dark urine, or easily bruising. Denied headache, acute vision change, dizziness, chest pain, worsen shortness of breath, palpitation, productive cough, nausea, vomiting, abdominal pain, altered bowel habits, dysuria, worsen bone pain or back pain, new focal numbness or weakness. Oncologic History:  Oncology History    No history exists. Past Medical History, Family History, and Social History:    Past Medical History:   Diagnosis Date    Acne     Dr. Chávez Enter    Allergic rhinitis     Dr. Armstrong Favors    Anemia, iron deficiency 2020    Dr Garcia Parker; s/p iron infusion Dr Anuj Kruse; has not tried other ssris    Chronic migraine without aura     failed beta blocker, depakote, topamax per pt    Chronic pain     Dr Marcos Villatoro    Degeneration of cervical intervertebral disc     Degenerative arthritis of lumbar spine     Dr Sirera Baird  showed degen changes L1-L2 and L3-L4, mild to mod bilat foraminal narrowing    Fibromyalgia syndrome     Ganglion cyst     GERD (gastroesophageal reflux disease)     Hyperlipidemia     calculated 10 year risk score 4.6% (9/15); 4.9% (9); 7.1% (); 9% (); 11% ()    Hypertension 2019    Hypovitaminosis D     PUD (peptic ulcer disease)     Dr. Dominga Mosley, duodenal w gi bleed ();  Dr Garcia Parker pyloric ulcer (20) Spinal stenosis in cervical region     Tension headache, chronic     uses fioricet    Tobacco dependence syndrome     declined meds; able to quit on her own but not ready due to stressors; low dose CT neg (1/21)     Past Surgical History:   Procedure Laterality Date    COLONOSCOPY N/A 7/2/2020    Dr Reina Villar 3/28/11 neg; Dr Quevedo Presummatthias 7/2/20 neg    HX BREAST BIOPSY Left 3/5/2021    LEFT BREAST EXCISIONAL  BIOPSY performed by Denilson Decker [facility] at [facility]      Dr Quevedo Presummatthias 10/30/20 hp neg    Dariela Johny Kirk; 7 hand surgeries    Dipti Sargent foot surgery    HX ORTHOPAEDIC  2000s    h/o pelvis fracture    HX OTHER SURGICAL      s/p lipoma resecton     Social History     Socioeconomic History    Marital status:      Spouse name: Not on file    Number of children: 1    Years of education: Not on file    Highest education level: Not on file   Occupational History    Occupation: owns [employer]   Tobacco Use    Smoking status: Current Every Day Smoker     Packs/day: 0.50     Years: 37.00     Pack years: 18.50     Types: Cigarettes    Smokeless tobacco: Never Used   Vaping Use    Vaping Use: Never used   Substance and Sexual Activity    Alcohol use: No    Drug use: Never    Sexual activity: Not on file   Other Topics Concern    Not on file   Social History Narrative    Not on file     Social Determinants of Health     Financial Resource Strain:     Difficulty of Paying Living Expenses:    Food Insecurity:     Worried About [address] in the Last Year:     [address] in the Last Year:    Transportation Needs:     Lack of Transportation (Medical):     Lack of Transportation (Non-Medical):    Physical Activity:     Days of Exercise per Week:     Minutes of Exercise per Session:    Stress:     Feeling of Stress :    Social Connections:     Frequency of Communication with Friends and Family:     Frequency of Social Gatherings with Friends and Family:     Attends Jew Services: Active Member of Clubs or Organizations:     Attends Club or Organization Meetings:     Marital Status:    Intimate Partner Violence:     Fear of Current or Ex-Partner:     Emotionally Abused:     Physically Abused:     Sexually Abused:      Family History   Problem Relation Age of Onset    Stroke Father      Current Outpatient Medications   Medication Sig Dispense Refill    atorvastatin (LIPITOR) 40 mg tablet Take 1 Tab by mouth daily. 90 Tab 3    amLODIPine (NORVASC) 5 mg tablet TAKE 1 TABLET BY MOUTH EVERY DAY 90 Tab 3    butalbital-acetaminophen-caffeine (FIORICET, ESGIC) -40 mg per tablet TAKE 2 TABLETS ORALLY 3 TIMES A DAY FOR CHRONIC HEADACHES.      busPIRone (BUSPAR) 30 mg tablet TAKE 1 TABLET BY MOUTH TWICE A DAY      venlafaxine-SR (EFFEXOR-XR) 75 mg capsule TAKE 3 CAPSULES BY MOUTH EVERY DAY      cetirizine HCl (ZYRTEC PO) Take  by mouth as needed. LORazepam (Ativan) 1 mg tablet Take 1 mg by mouth two (2) times daily as needed for Anxiety. cholecalciferol (VITAMIN D3) (2,000 UNITS /50 MCG) cap capsule Take  by mouth daily. HYDROcodone-acetaminophen (NORCO)  mg tablet Take 1 Tab by mouth every six (6) hours as needed for Pain. Indications: pain      pantoprazole (PROTONIX) 40 mg tablet Take 1 Tab by mouth daily. 180 Tab 3    buPROPion XL (Wellbutrin XL) 300 mg XL tablet Take 300 mg by mouth nightly. SUMAtriptan (IMITREX) 100 mg tablet Take 1 Tab by mouth once as needed for Migraine for up to 1 dose. Indications: Migraine 9 Tab 0    cyclobenzaprine (FLEXERIL) 10 mg tablet TAKE 1 TABLET BY MOUTH THREE TIMES DAILY AS NEEDED FOR MUSCLE SPASM(S)--- 90 day supply  Indications: MUSCLE SPASM 270 Tab 3    naloxone 4 mg/actuation spry 4 mg by Nasal route as needed for up to 2 doses. Indications: OPIOID TOXICITY 1 Box 1       Review of Systems   Constitutional: Negative for chills, diaphoresis, fever, malaise/fatigue and weight loss. Respiratory: Negative for cough, hemoptysis, shortness of breath and wheezing. Cardiovascular: Negative for chest pain, palpitations and leg swelling. Gastrointestinal: Negative for abdominal pain, diarrhea, heartburn, nausea and vomiting. Genitourinary: Negative for dysuria, frequency, hematuria and urgency. Musculoskeletal: Negative for joint pain and myalgias. Skin: Negative for itching and rash. Neurological: Negative for dizziness, seizures, weakness and headaches. Psychiatric/Behavioral: Negative for depression. The patient does not have insomnia. Objective:     Visit Vitals  /82   Pulse 94   Resp 16   Ht 5' 4\" (1.626 m)   Wt 137 lb (62.1 kg)   SpO2 99%   BMI 23.52 kg/m²         ECOG Performance Status (grade): 0  0 - able to carry on all pre-disease activity w/out restriction  1 - restricted but able to carry out light work  2 - ambulatory and can self- care but unable to carry out work  3 - bed or chair >50% of waking hours  4 - completely disable, total care, confined to bed or chair    Physical Exam  Constitutional:       Appearance: Normal appearance. HENT:      Head: Normocephalic and atraumatic. Eyes:      Pupils: Pupils are equal, round, and reactive to light. Cardiovascular:      Rate and Rhythm: Normal rate and regular rhythm. Heart sounds: Normal heart sounds. Pulmonary:      Effort: Pulmonary effort is normal.      Breath sounds: Normal breath sounds. Abdominal:      General: Bowel sounds are normal.      Palpations: Abdomen is soft. Tenderness: There is no abdominal tenderness. There is no guarding. Musculoskeletal:         General: Normal range of motion. Cervical back: Neck supple. Right lower leg: No edema. Left lower leg: No edema. Skin:     General: Skin is warm. Neurological:      General: No focal deficit present. Mental Status: She is alert and oriented to person, place, and time.  Mental status is at baseline. Diagnostics:      No results found for this or any previous visit (from the past 96 hour(s)). Imaging:  No results found for this or any previous visit. Results for orders placed during the hospital encounter of 04/02/19    XR CHEST PA LAT    Narrative  Chest  PA and lateral views    INDICATION:  Cough for 2 months    COMPARISON:  Chest x-ray 1/21/2011    FINDINGS:  The cardiac silhouette is normal in size. Atherosclerosis noted. Mild  reticular markings at the peripheral mid to lower lungs, possibly scarring. No  focal consolidation, pleural effusion, or pneumothorax. Lungs are hyperinflated. No acute osseous abnormalities are identified. Impression  IMPRESSION:  Nothing clearly acute. Probable mild subpleural scarring at the mid to lower lungs. Hyperinflation, possibly indicative of COPD. Results for orders placed in visit on 01/28/21    CT LOW DOSE LUNG CANCER SCREENING    Narrative  EXAM:  CT Chest without Contrast - Low Dose Screening CT. CLINICAL INDICATION:    - F17.200 (ICD-10-CM) - Tobacco dependence syndrome  - Screening.  - Meets the criteria, i.e. 61year-old, 46 pack-year smoking history (46 yrs x 1  ppd). COMPARISON:  None. TECHNIQUE:  Low dose unenhanced multislice helical CT is performed from the  thoracic inlet to the adrenal glands without intravenous contrast  administration. Contiguous axial images were reconstructed and lung and soft  tissue windows were generated. Coronal and sagittal reformations were also  generated    Dose reduction techniques are used, including automated exposure control,  adjustment of the mAs and/or kVp according to patient size, standardized  low-dose protocol, and/or iterative reconstruction technique. FINDINGS:    Technique Assessment:  The overall quality of the study is adequate for  diagnostic review. Chest    - No distinct mass or suspicious lung nodule is detected bilaterally.   - Right apical pleuroparenchymal scarring.  - No airspace opacities. - No significant interstitial fibrosis. - No significant pleural effusion.  - No mediastinal adenopathy. Miscellaneous:    - Base of Neck:  No acute abnormalities. No adenopathy. - Axillae:  No adenopathy.  - Esophagus:  Grossly unremarkable. - Upper Abdomen:  No acute abnormalities. - Skeletal Structures:  Grossly unremarkable. Impression  No lung nodule identified. Lung-RADS Category:  1. Negative. Management recommendation:  Low dose screening CT in 12 months. Pathology          Diagnosis:                                         Diagnosis Orders   1. Iron deficiency anemia, unspecified iron deficiency anemia type              Assessment and Plan:                                        # Iron deficiency anemia  # Normocytic Anemia, improving  -- The patient has a past medical history significant of gastritis, mild duodenal deformity, and iron deficiency anemia. -- Lab reviews indicated chronic anemia since at least 1/20/2011 hemoglobin 10.4.  -- 3/8/2021 SPEP/JUSTICE/[facility] reported the immunofixation pattern appears unremarkable. Evidence of   monoclonal protein is not apparent. -- The patient could not tolerate iron pills. She was arranged for IV Injectafer. .  -- 3/25/2021 s.p IV Injectafer x 2, 1 week apart. Tolerated it well.   -- 9/29/2021 CBC reported hemoglobin 11.6, hematocrit 36.8%, .9, WBC 4.9, platelet 198,598. Iron profile showed saturation 21% and ferritin improving to 23.  -- 11/10/2021 S/p Injectafer x 2   -- 2/3/2022 H/H 12.8/40. 1. Iron sat 38%, Ferritin improved to 234.  -- 8/9/2022 H/H 10.6/35. Iron sat 7%, Ferritin 8, TIBC 414  -- S.p IV Injectafer x2  -- Today I have reviewed with the patient about recent lab reports. 2/14/2023 H/H improving 13.4/41.  Iron 77, Iron sat 25%, Ferritin 45      Plan:  -- She could not tolerate iron pills  -- Advised to take iron-rich food  -- Continue lab check CBC, Iron profiles and ferritin periodically. -- Advised the patient to contact us if symptomatic issues or concerns. -- The patient will f/u with her GI if any further EGD/Colonoscopy indicated. -- We will see the patient back in about 4 months. Always sooner if required. The patient can have lab done prior to our next clinic visit          Ms. [facility] has a reminder for a \"due or due soon\" health maintenance. I have asked that she contact her primary care provider for follow-up on this health maintenance. All of patient's questions answered to their apparent satisfaction. They verbally show understanding and agreement with aforementioned plan. Esteban Nathan MD  2/23/23        Above times were spent for this encounter with more than 50% of the time spent in face-to-face counseling, discussing on diagnosis and management plan going forward, and co-ordination of care. Parts of this document has been produced using Dragon dictation system. Unrecognized errors in transcription may be present. Please do not hesitate to reach out for any questions or clarifications.       CC: Sandra Montoya MD

## 2023-03-20 DIAGNOSIS — E78.5 HYPERLIPIDEMIA, UNSPECIFIED HYPERLIPIDEMIA TYPE: Primary | ICD-10-CM

## 2023-03-21 ENCOUNTER — HOSPITAL ENCOUNTER (OUTPATIENT)
Facility: HOSPITAL | Age: 63
Setting detail: SPECIMEN
Discharge: HOME | End: 2023-03-24
Payer: COMMERCIAL

## 2023-03-21 DIAGNOSIS — E55.9 HYPOVITAMINOSIS D: ICD-10-CM

## 2023-03-21 DIAGNOSIS — E78.5 HYPERLIPIDEMIA, UNSPECIFIED HYPERLIPIDEMIA TYPE: ICD-10-CM

## 2023-03-21 LAB
25(OH)D3 SERPL-MCNC: 16.4 NG/ML (ref 30–100)
ALBUMIN SERPL-MCNC: 3.6 G/DL (ref 3.4–5)
ALBUMIN/GLOB SERPL: 1 (ref 0.8–1.7)
ALP SERPL-CCNC: 129 U/L (ref 45–117)
ALT SERPL-CCNC: 66 U/L (ref 13–56)
ANION GAP SERPL CALC-SCNC: 3 MMOL/L (ref 3–18)
AST SERPL-CCNC: 76 U/L (ref 10–38)
BILIRUB SERPL-MCNC: 0.6 MG/DL (ref 0.2–1)
BUN SERPL-MCNC: 17 MG/DL (ref 7–18)
BUN/CREAT SERPL: 21 (ref 12–20)
CALCIUM SERPL-MCNC: 8.8 MG/DL (ref 8.5–10.1)
CHLORIDE SERPL-SCNC: 107 MMOL/L (ref 100–111)
CHOLEST SERPL-MCNC: 199 MG/DL
CO2 SERPL-SCNC: 29 MMOL/L (ref 21–32)
CREAT SERPL-MCNC: 0.8 MG/DL (ref 0.6–1.3)
GLOBULIN SER CALC-MCNC: 3.7 G/DL (ref 2–4)
GLUCOSE SERPL-MCNC: 89 MG/DL (ref 74–99)
HDLC SERPL-MCNC: 76 MG/DL (ref 40–60)
HDLC SERPL: 2.6 (ref 0–5)
LDLC SERPL CALC-MCNC: 84.2 MG/DL (ref 0–100)
LIPID PANEL: ABNORMAL
POTASSIUM SERPL-SCNC: 4.2 MMOL/L (ref 3.5–5.5)
PROT SERPL-MCNC: 7.3 G/DL (ref 6.4–8.2)
SODIUM SERPL-SCNC: 139 MMOL/L (ref 136–145)
TRIGL SERPL-MCNC: 194 MG/DL
VLDLC SERPL CALC-MCNC: 38.8 MG/DL

## 2023-03-21 PROCEDURE — 80061 LIPID PANEL: CPT

## 2023-03-21 PROCEDURE — 80053 COMPREHEN METABOLIC PANEL: CPT

## 2023-03-21 PROCEDURE — 36415 COLL VENOUS BLD VENIPUNCTURE: CPT

## 2023-03-21 PROCEDURE — 82306 VITAMIN D 25 HYDROXY: CPT

## 2023-03-28 ENCOUNTER — OFFICE VISIT (OUTPATIENT)
Age: 63
End: 2023-03-28
Payer: COMMERCIAL

## 2023-03-28 ENCOUNTER — HOSPITAL ENCOUNTER (OUTPATIENT)
Facility: HOSPITAL | Age: 63
Setting detail: SPECIMEN
Discharge: HOME | End: 2023-03-31
Payer: COMMERCIAL

## 2023-03-28 VITALS
SYSTOLIC BLOOD PRESSURE: 124 MMHG | WEIGHT: 133 LBS | RESPIRATION RATE: 17 BRPM | OXYGEN SATURATION: 97 % | DIASTOLIC BLOOD PRESSURE: 78 MMHG | HEART RATE: 98 BPM | BODY MASS INDEX: 22.71 KG/M2 | TEMPERATURE: 98.7 F | HEIGHT: 64 IN

## 2023-03-28 DIAGNOSIS — Z87.891 PERSONAL HISTORY OF TOBACCO USE: ICD-10-CM

## 2023-03-28 DIAGNOSIS — G89.4 CHRONIC PAIN SYNDROME: ICD-10-CM

## 2023-03-28 DIAGNOSIS — F41.9 ANXIETY: ICD-10-CM

## 2023-03-28 DIAGNOSIS — D50.9 IRON DEFICIENCY ANEMIA, UNSPECIFIED IRON DEFICIENCY ANEMIA TYPE: ICD-10-CM

## 2023-03-28 DIAGNOSIS — F17.200 TOBACCO DEPENDENCE SYNDROME: ICD-10-CM

## 2023-03-28 DIAGNOSIS — E78.5 HYPERLIPIDEMIA, UNSPECIFIED HYPERLIPIDEMIA TYPE: ICD-10-CM

## 2023-03-28 DIAGNOSIS — R74.01 TRANSAMINASEMIA: Primary | ICD-10-CM

## 2023-03-28 DIAGNOSIS — Z12.31 SCREENING MAMMOGRAM FOR BREAST CANCER: ICD-10-CM

## 2023-03-28 DIAGNOSIS — Z00.00 PHYSICAL EXAM: ICD-10-CM

## 2023-03-28 DIAGNOSIS — R74.01 TRANSAMINASEMIA: ICD-10-CM

## 2023-03-28 DIAGNOSIS — E55.9 HYPOVITAMINOSIS D: ICD-10-CM

## 2023-03-28 DIAGNOSIS — I10 PRIMARY HYPERTENSION: ICD-10-CM

## 2023-03-28 PROCEDURE — 82728 ASSAY OF FERRITIN: CPT

## 2023-03-28 PROCEDURE — 3078F DIAST BP <80 MM HG: CPT | Performed by: INTERNAL MEDICINE

## 2023-03-28 PROCEDURE — 36415 COLL VENOUS BLD VENIPUNCTURE: CPT

## 2023-03-28 PROCEDURE — 86381 MITOCHONDRIAL ANTIBODY EACH: CPT

## 2023-03-28 PROCEDURE — G0296 VISIT TO DETERM LDCT ELIG: HCPCS | Performed by: INTERNAL MEDICINE

## 2023-03-28 PROCEDURE — 87340 HEPATITIS B SURFACE AG IA: CPT

## 2023-03-28 PROCEDURE — 82390 ASSAY OF CERULOPLASMIN: CPT

## 2023-03-28 PROCEDURE — 3074F SYST BP LT 130 MM HG: CPT | Performed by: INTERNAL MEDICINE

## 2023-03-28 PROCEDURE — 99396 PREV VISIT EST AGE 40-64: CPT | Performed by: INTERNAL MEDICINE

## 2023-03-28 PROCEDURE — 86038 ANTINUCLEAR ANTIBODIES: CPT

## 2023-03-28 PROCEDURE — 86803 HEPATITIS C AB TEST: CPT

## 2023-03-28 RX ORDER — ERGOCALCIFEROL 1.25 MG/1
50000 CAPSULE ORAL WEEKLY
Qty: 12 CAPSULE | Refills: 1 | Status: SHIPPED | OUTPATIENT
Start: 2023-03-28

## 2023-03-28 RX ORDER — PROMETHAZINE HYDROCHLORIDE 25 MG/1
TABLET ORAL
COMMUNITY
Start: 2023-03-20

## 2023-03-28 SDOH — ECONOMIC STABILITY: FOOD INSECURITY: WITHIN THE PAST 12 MONTHS, THE FOOD YOU BOUGHT JUST DIDN'T LAST AND YOU DIDN'T HAVE MONEY TO GET MORE.: NEVER TRUE

## 2023-03-28 SDOH — ECONOMIC STABILITY: HOUSING INSECURITY
IN THE LAST 12 MONTHS, WAS THERE A TIME WHEN YOU DID NOT HAVE A STEADY PLACE TO SLEEP OR SLEPT IN A SHELTER (INCLUDING NOW)?: NO

## 2023-03-28 SDOH — ECONOMIC STABILITY: FOOD INSECURITY: WITHIN THE PAST 12 MONTHS, YOU WORRIED THAT YOUR FOOD WOULD RUN OUT BEFORE YOU GOT MONEY TO BUY MORE.: NEVER TRUE

## 2023-03-28 SDOH — ECONOMIC STABILITY: INCOME INSECURITY: HOW HARD IS IT FOR YOU TO PAY FOR THE VERY BASICS LIKE FOOD, HOUSING, MEDICAL CARE, AND HEATING?: NOT HARD AT ALL

## 2023-03-28 ASSESSMENT — PATIENT HEALTH QUESTIONNAIRE - PHQ9
SUM OF ALL RESPONSES TO PHQ QUESTIONS 1-9: 0
1. LITTLE INTEREST OR PLEASURE IN DOING THINGS: 0
SUM OF ALL RESPONSES TO PHQ9 QUESTIONS 1 & 2: 0
SUM OF ALL RESPONSES TO PHQ QUESTIONS 1-9: 0
2. FEELING DOWN, DEPRESSED OR HOPELESS: 0

## 2023-03-28 NOTE — PROGRESS NOTES
Cally Alexis presents today for   Chief Complaint   Patient presents with    Follow-up    Discuss Labs     3-21-23    Anemia     Iron deficiency anemia     Hypertension    Hyperlipidemia     1. \"Have you been to the ER, urgent care clinic since your last visit? Hospitalized since your last visit? \" no    2. \"Have you seen or consulted any other health care providers outside of the [address] [place] [address] since your last visit? \" no     3. For patients aged 39-70: Has the patient had a colonoscopy / FIT/ Cologuard? Yes - no Care Gap present      If the patient is female:    4. For patients aged 41-77: Has the patient had a mammogram within the past 2 years? No      5. For patients aged 21-65: Has the patient had a pap smear?  No
Other (See Comments)    Nsaids Other (See Comments)     Pt has had bleeding ulcers     REVIEW OF SYSTEMS: mammo 1/21, colo 7/20 Dr Zafar Hernandez  Ophtho - no vision change or eye pain  Oral - no mouth pain, tongue or tooth problems  Ears - no hearing loss, ear pain, fullness, no swallowing problems  Cardiac - no CP, PND, orthopnea, edema, palpitations or syncope  Chest - no breast masses  Resp - no wheezing, chronic coughing, dyspnea  Urinary - no dysuria, hematuria, flank pain, urgency, frequency    Vitals:    03/28/23 1351   BP: 124/78   Pulse: 98   Resp: 17   Temp: 98.7 °F (37.1 °C)   SpO2: 97%     A&O x3  Affect is appropriate. Mood stable  No apparent distress  Anicteric, no JVD, adenopathy or thyromegaly. Mouth ulcers evident bilaterally  No carotid bruits or radiated murmur  Lungs clear to auscultation, no wheezes or rales  Heart showed regular rate and rhythm. No murmur, rubs, gallops  Abdomen soft nontender, no hepatosplenomegaly or masses. Extremities without edema.   Pulses 1-2+ symmetrically    LABS   From 2/10 showed gluc 88, cr 0.80,             alt 26,   chol 255, tg 98    hdl 68, ldl-c 168, wbc 4.5, hb 13.4, plt 233, tsh 0.92, ua neg  From 1/11 showed gluc 94, cr 0.60, gfr>60,              chol 137, tg 123, hdl 29, ldl-c 83,                         ck/trop-  From 6/13 showed gluc 95, cr 0.70, gfr>60, alt 17,   chol 232, tg 170, hdl 62, ldl-c 136,           ua neg, vit d 23.4  From 9/15 showed gluc 91, cr 0.96, gfr 60,  alt<5,    chol 255, tg 103, hdl 76, ldl-c 158, wbc 6.8, hb 13.2, plt 271,       ua neg, vit d 23.0  From 9/16 showed gluc 87, cr 0.73, gfr 92,  alt 13,   chol 254, tg 122, hdl 84, ldl-c 146, wbc 5.6, hb 13.0, plt 270, tsh 1.76, ua neg,            hep c-  From 9/17 showed gluc 86, cr 0.76, gfr>60, alt 15,   chol 263, tg 145, hdl 92, ldl-c 142, wbc 5.2, hb 12.2, plt 277,       ua neg, vit d 99.4  From 1/19 showed glu 101, cr 0.88, gfr>60, alt 12,   chol 283, tg 183, hdl 76, ldl-c 162,

## 2023-03-28 NOTE — PATIENT INSTRUCTIONS
follow-up, he or she will help you understand what to do next. After a lung cancer screening, you can go back to your usual activities right away. A lung cancer screening test can't tell if you have lung cancer. If your results are positive, your doctor can't tell whether an abnormal finding is a harmless nodule, cancer, or something else without doing more tests. What can you do to help prevent lung cancer? Some lung cancers can't be prevented. But if you smoke, quitting smoking is the best step you can take to prevent lung cancer. If you want to quit, your doctor can recommend medicines or other ways to help. Follow-up care is a key part of your treatment and safety. Be sure to make and go to all appointments, and call your doctor if you are having problems. It's also a good idea to know your test results and keep a list of the medicines you take. Where can you learn more? Go to http://www.mckeon.com/ and enter Q940 to learn more about \"Learning About Lung Cancer Screening. \"  Current as of: May 4, 2022               Content Version: 13.6  © 6733-5429 [employer]wise, [employer]. Care instructions adapted under license by [facility] ([facility]). If you have questions about a medical condition or this instruction, always ask your healthcare professional. [address] any warranty or liability for your use of this information.

## 2023-03-29 LAB
FERRITIN SERPL-MCNC: 30 NG/ML (ref 8–388)
HBV SURFACE AG SER QL: <0.1 INDEX
HBV SURFACE AG SER QL: NEGATIVE
HCV AB SER IA-ACNC: 0.05 INDEX
HCV AB SERPL QL IA: NEGATIVE
Lab: NORMAL

## 2023-03-30 LAB — CERULOPLASMIN SERPL-MCNC: 28.6 MG/DL (ref 19–39)

## 2023-03-31 LAB
ANA SPECKLED TITR SER: ABNORMAL {TITER}
ANA TITR SER IF: POSITIVE
MITOCHONDRIA M2 IGG SER-ACNC: <20 UNITS (ref 0–20)
NOTE: ABNORMAL

## 2023-04-05 ENCOUNTER — TELEPHONE (OUTPATIENT)
Age: 63
End: 2023-04-05

## 2023-04-05 DIAGNOSIS — R76.8 POSITIVE ANA (ANTINUCLEAR ANTIBODY): ICD-10-CM

## 2023-04-05 DIAGNOSIS — K75.4 AUTOIMMUNE HEPATITIS (HCC): Primary | ICD-10-CM

## 2023-04-05 NOTE — TELEPHONE ENCOUNTER
Pls call    Results for orders placed or performed during the hospital encounter of 03/28/23 (from the past 2160 hour(s))   Antinuclear AB Screen IFA   Result Value Ref Range    ANNE-MARIE Positive (A)     Ceruloplasmin   Result Value Ref Range    Ceruloplasmin 28.6 19.0 - 39.0 mg/dL   Ferritin   Result Value Ref Range    Ferritin 30 8 - 388 NG/ML   Mitochondrial antibodies, M2   Result Value Ref Range    Mitochondrial M2 Ab, IgG <20.0 0.0 - 20.0 Units   Hepatitis C Antibody   Result Value Ref Range    Hepatitis C Ab .05 <0.80 Index    Interpretation Negative NEG      Comments:         Hepatitis B Surface Antigen   Result Value Ref Range    Hepatitis B Surface Ag <0.10 <1.00 Index    Interpretation Negative NEG     Anti-nuclear Antibody Patterns   Result Value Ref Range    Speckled Pattern 1:1,280 (H)     NOTE: Comment     Results for orders placed or performed during the hospital encounter of 03/21/23 (from the past 2160 hour(s))   Lipid Panel   Result Value Ref Range    LIPID PANEL          Cholesterol, Total 199 <200 MG/DL    Triglycerides 194 (H) <150 MG/DL    HDL 76 (H) 40 - 60 MG/DL    LDL Calculated 84.2 0 - 100 MG/DL    VLDL Cholesterol Calculated 38.8 MG/DL    Chol/HDL Ratio 2.6 0 - 5.0     Vitamin D 25 Hydroxy   Result Value Ref Range    Vit D, 25-Hydroxy 16.4 (L) 30 - 100 ng/mL   Comprehensive Metabolic Panel   Result Value Ref Range    Sodium 139 136 - 145 mmol/L    Potassium 4.2 3.5 - 5.5 mmol/L    Chloride 107 100 - 111 mmol/L    CO2 29 21 - 32 mmol/L    Anion Gap 3 3.0 - 18 mmol/L    Glucose 89 74 - 99 mg/dL    BUN 17 7.0 - 18 MG/DL    Creatinine 0.80 0.6 - 1.3 MG/DL    Bun/Cre Ratio 21 (H) 12 - 20      Est, Glom Filt Rate >60 >60 ml/min/1.73m2    Calcium 8.8 8.5 - 10.1 MG/DL    Total Bilirubin 0.6 0.2 - 1.0 MG/DL    ALT 66 (H) 13 - 56 U/L    AST 76 (H) 10 - 38 U/L    Alk Phosphatase 129 (H) 45 - 117 U/L    Total Protein 7.3 6.4 - 8.2 g/dL    Albumin 3.6 3.4 - 5.0 g/dL    Globulin 3.7 2.0 - 4.0 g/dL

## 2023-04-25 ENCOUNTER — HOSPITAL ENCOUNTER (OUTPATIENT)
Facility: HOSPITAL | Age: 63
Setting detail: SPECIMEN
Discharge: HOME | End: 2023-04-28
Payer: COMMERCIAL

## 2023-04-25 DIAGNOSIS — R74.01 TRANSAMINASEMIA: ICD-10-CM

## 2023-04-25 LAB
ALBUMIN SERPL-MCNC: 3.9 G/DL (ref 3.4–5)
ALBUMIN/GLOB SERPL: 1.1 (ref 0.8–1.7)
ALP SERPL-CCNC: 115 U/L (ref 45–117)
ALT SERPL-CCNC: 25 U/L (ref 13–56)
AST SERPL-CCNC: 27 U/L (ref 10–38)
BILIRUB DIRECT SERPL-MCNC: <0.1 MG/DL (ref 0–0.2)
BILIRUB SERPL-MCNC: 0.2 MG/DL (ref 0.2–1)
GLOBULIN SER CALC-MCNC: 3.6 G/DL (ref 2–4)
PROT SERPL-MCNC: 7.5 G/DL (ref 6.4–8.2)

## 2023-04-25 PROCEDURE — 36415 COLL VENOUS BLD VENIPUNCTURE: CPT

## 2023-04-25 PROCEDURE — 80076 HEPATIC FUNCTION PANEL: CPT

## 2023-04-29 DIAGNOSIS — E78.5 HYPERLIPIDEMIA, UNSPECIFIED: ICD-10-CM

## 2023-05-02 RX ORDER — ATORVASTATIN CALCIUM 40 MG/1
TABLET, FILM COATED ORAL
Qty: 90 TABLET | Refills: 3 | Status: SHIPPED | OUTPATIENT
Start: 2023-05-02

## 2023-06-27 ENCOUNTER — TELEMEDICINE (OUTPATIENT)
Age: 63
End: 2023-06-27
Payer: COMMERCIAL

## 2023-06-27 DIAGNOSIS — D50.9 IRON DEFICIENCY ANEMIA, UNSPECIFIED IRON DEFICIENCY ANEMIA TYPE: Primary | ICD-10-CM

## 2023-06-27 DIAGNOSIS — D50.8 IRON DEFICIENCY ANEMIA SECONDARY TO INADEQUATE DIETARY IRON INTAKE: ICD-10-CM

## 2023-06-27 PROCEDURE — 99442 PR PHYS/QHP TELEPHONE EVALUATION 11-20 MIN: CPT | Performed by: NURSE PRACTITIONER

## 2023-06-27 ASSESSMENT — ENCOUNTER SYMPTOMS
CHEST TIGHTNESS: 0
DIARRHEA: 0
BACK PAIN: 0
NAUSEA: 0
SHORTNESS OF BREATH: 0
ABDOMINAL PAIN: 0
VOMITING: 0
CONSTIPATION: 0
COLOR CHANGE: 0

## 2023-07-24 ENCOUNTER — TELEPHONE (OUTPATIENT)
Age: 63
End: 2023-07-24

## 2023-07-24 DIAGNOSIS — E78.5 HYPERLIPIDEMIA, UNSPECIFIED HYPERLIPIDEMIA TYPE: Primary | ICD-10-CM

## 2023-07-25 ENCOUNTER — HOSPITAL ENCOUNTER (OUTPATIENT)
Facility: HOSPITAL | Age: 63
Setting detail: SPECIMEN
Discharge: HOME | End: 2023-07-28
Payer: COMMERCIAL

## 2023-07-25 DIAGNOSIS — E78.5 HYPERLIPIDEMIA, UNSPECIFIED HYPERLIPIDEMIA TYPE: ICD-10-CM

## 2023-07-25 LAB
ALBUMIN SERPL-MCNC: 3.8 G/DL (ref 3.4–5)
ALBUMIN/GLOB SERPL: 1.1 (ref 0.8–1.7)
ALP SERPL-CCNC: 92 U/L (ref 45–117)
ALT SERPL-CCNC: 13 U/L (ref 13–56)
ANION GAP SERPL CALC-SCNC: 5 MMOL/L (ref 3–18)
AST SERPL-CCNC: 11 U/L (ref 10–38)
BILIRUB SERPL-MCNC: 0.8 MG/DL (ref 0.2–1)
BUN SERPL-MCNC: 18 MG/DL (ref 7–18)
BUN/CREAT SERPL: 19 (ref 12–20)
CALCIUM SERPL-MCNC: 9.1 MG/DL (ref 8.5–10.1)
CHLORIDE SERPL-SCNC: 108 MMOL/L (ref 100–111)
CHOLEST SERPL-MCNC: 269 MG/DL
CO2 SERPL-SCNC: 24 MMOL/L (ref 21–32)
CREAT SERPL-MCNC: 0.97 MG/DL (ref 0.6–1.3)
GLOBULIN SER CALC-MCNC: 3.5 G/DL (ref 2–4)
GLUCOSE SERPL-MCNC: 89 MG/DL (ref 74–99)
HDLC SERPL-MCNC: 89 MG/DL (ref 40–60)
HDLC SERPL: 3 (ref 0–5)
LDLC SERPL CALC-MCNC: 153.8 MG/DL (ref 0–100)
LIPID PANEL: ABNORMAL
POTASSIUM SERPL-SCNC: 4.8 MMOL/L (ref 3.5–5.5)
PROT SERPL-MCNC: 7.3 G/DL (ref 6.4–8.2)
SODIUM SERPL-SCNC: 137 MMOL/L (ref 136–145)
TRIGL SERPL-MCNC: 131 MG/DL
VLDLC SERPL CALC-MCNC: 26.2 MG/DL

## 2023-07-25 PROCEDURE — 80061 LIPID PANEL: CPT

## 2023-07-25 PROCEDURE — 36415 COLL VENOUS BLD VENIPUNCTURE: CPT

## 2023-07-25 PROCEDURE — 80053 COMPREHEN METABOLIC PANEL: CPT

## 2023-08-04 NOTE — PROGRESS NOTES
58 y.o. female who presents for follow up     She continues to go to Dr Royal Preciado for pain mgmt. She following up with Dr Jhon Gomez for the anemia and received iron infusion. She is now c/o spontaneous bruising that's occurred intermittently in her LUE, bilat thighs    At the last visit, her lfts were elevated so we held the lipitor. Follow up testing showed high titer kay so we referred her to Dr Felicita Moreno and Dr Hellen Larsen. She did not go to either as her daughter has been having a lot of psych issues and has been in and out of the hospital.  Her dad finally passed away but she still has her demented mom to take care of so lots of stress    No  or gyn complaints    Her bp has been controlled and no cardiovascular complaints. No set exercise. She is requesting handicap sticker as she can't walk very far with her hips and knees, also she drives her mom around    Past Medical History:   Diagnosis Date    Acne     Dr. Dustin Breaux    Allergic rhinitis     Dr. Jenn Whatley    Anemia, iron deficiency 06/2020    Dr Felicita Moreno; s/p iron infusion Dr Kiley Hawthorne; has not tried other ssris    Chronic migraine without aura     failed beta blocker, depakote, topamax per pt    Chronic pain     Dr Royal Preciado    Degenerative arthritis of cervical spine     spinal stenosis    Degenerative arthritis of lumbar spine     Dr Nile Sykes 2011 showed degen changes L1-L2 and L3-L4, mild to mod bilat foraminal narrowing    Fibromyalgia syndrome     Ganglion cyst     GERD (gastroesophageal reflux disease)     Hyperlipidemia     calculated 10 year risk score 4.6% (9/15); 4.9% (9/16); 7.1% (1/19); 9% (6/20); 11% (9/20)    Hypertension 04/02/2019    PUD (peptic ulcer disease)     Dr. Romana Nevarez, duodenal w gi bleed (2011);  Dr Felicita Moreno pyloric ulcer (7/2/20)    Tension headache, chronic     uses fioricet    Tobacco dependence syndrome     declined meds and unable to stop due to stressors; LDCT neg (1/21), (4/22)    Vitamin D deficiency      Past Surgical

## 2023-08-08 ENCOUNTER — OFFICE VISIT (OUTPATIENT)
Age: 63
End: 2023-08-08
Payer: COMMERCIAL

## 2023-08-08 VITALS
WEIGHT: 136 LBS | DIASTOLIC BLOOD PRESSURE: 71 MMHG | BODY MASS INDEX: 23.22 KG/M2 | HEART RATE: 110 BPM | SYSTOLIC BLOOD PRESSURE: 115 MMHG | TEMPERATURE: 98.3 F | OXYGEN SATURATION: 96 % | RESPIRATION RATE: 16 BRPM | HEIGHT: 64 IN

## 2023-08-08 DIAGNOSIS — E78.5 HYPERLIPIDEMIA, UNSPECIFIED HYPERLIPIDEMIA TYPE: ICD-10-CM

## 2023-08-08 DIAGNOSIS — G89.4 CHRONIC PAIN SYNDROME: ICD-10-CM

## 2023-08-08 DIAGNOSIS — I10 PRIMARY HYPERTENSION: ICD-10-CM

## 2023-08-08 DIAGNOSIS — F41.9 ANXIETY: ICD-10-CM

## 2023-08-08 DIAGNOSIS — T14.8XXA BRUISING: ICD-10-CM

## 2023-08-08 DIAGNOSIS — F17.200 TOBACCO DEPENDENCE SYNDROME: ICD-10-CM

## 2023-08-08 DIAGNOSIS — D64.9 ANEMIA, UNSPECIFIED TYPE: Primary | ICD-10-CM

## 2023-08-08 PROBLEM — N63.20 LEFT BREAST MASS: Status: RESOLVED | Noted: 2021-02-08 | Resolved: 2023-08-08

## 2023-08-08 PROCEDURE — 3074F SYST BP LT 130 MM HG: CPT | Performed by: INTERNAL MEDICINE

## 2023-08-08 PROCEDURE — 3078F DIAST BP <80 MM HG: CPT | Performed by: INTERNAL MEDICINE

## 2023-08-08 PROCEDURE — 99214 OFFICE O/P EST MOD 30 MIN: CPT | Performed by: INTERNAL MEDICINE

## 2023-08-08 SDOH — ECONOMIC STABILITY: FOOD INSECURITY: WITHIN THE PAST 12 MONTHS, THE FOOD YOU BOUGHT JUST DIDN'T LAST AND YOU DIDN'T HAVE MONEY TO GET MORE.: NEVER TRUE

## 2023-08-08 SDOH — ECONOMIC STABILITY: FOOD INSECURITY: WITHIN THE PAST 12 MONTHS, YOU WORRIED THAT YOUR FOOD WOULD RUN OUT BEFORE YOU GOT MONEY TO BUY MORE.: NEVER TRUE

## 2023-08-08 SDOH — ECONOMIC STABILITY: INCOME INSECURITY: HOW HARD IS IT FOR YOU TO PAY FOR THE VERY BASICS LIKE FOOD, HOUSING, MEDICAL CARE, AND HEATING?: NOT HARD AT ALL

## 2023-08-08 ASSESSMENT — PATIENT HEALTH QUESTIONNAIRE - PHQ9
SUM OF ALL RESPONSES TO PHQ QUESTIONS 1-9: 0
1. LITTLE INTEREST OR PLEASURE IN DOING THINGS: 0
2. FEELING DOWN, DEPRESSED OR HOPELESS: 0
SUM OF ALL RESPONSES TO PHQ9 QUESTIONS 1 & 2: 0
SUM OF ALL RESPONSES TO PHQ QUESTIONS 1-9: 0

## 2023-08-08 NOTE — PROGRESS NOTES
Yareli Willis presents today for   Chief Complaint   Patient presents with    Hypertension       Is someone accompanying this pt? No    Is the patient using any DME equipment during OV? No    Depression Screening:  PHQ-9  8/8/2023   Little interest or pleasure in doing things 0   Little interest or pleasure in doing things -   Feeling down, depressed, or hopeless 0   PHQ-2 Score 0   Total Score PHQ 2 -   PHQ-9 Total Score 0               Health Maintenance reviewed and discussed and ordered per Provider. Health Maintenance Due   Topic Date Due    HIV screen  Never done    Cervical cancer screen  Never done    COVID-19 Vaccine (4 - Booster for Pfizer series) 09/13/2022    Breast cancer screen  01/21/2023    Flu vaccine (1) 08/01/2023   . 1. \"Have you been to the ER, urgent care clinic since your last visit? Hospitalized since your last visit? \" No    2. \"Have you seen or consulted any other health care providers outside of the [address] [place] [address] since your last visit? \" No    3. For patients over 39: Has the patient had a colonoscopy? no    If the patient is female:    4. For patients over 40: Has the patient had a mammogram? Yes - no Care Gap present    5. For patients over 21: Has the patient had a pap smear?  Yes - no Care Gap present

## 2023-08-09 RX ORDER — ROSUVASTATIN CALCIUM 5 MG/1
5 TABLET, COATED ORAL NIGHTLY
Qty: 30 TABLET | Refills: 3 | Status: SHIPPED | OUTPATIENT
Start: 2023-08-09

## 2023-10-26 ENCOUNTER — TELEPHONE (OUTPATIENT)
Age: 63
End: 2023-10-26

## 2023-10-26 NOTE — TELEPHONE ENCOUNTER
----- Message from Jos Dao sent at 10/26/2023 10:47 AM EDT -----  Subject: Referral Request    Reason for referral request? pt says she needs ANNE-MARIE results sent to   [phone] phone  pt doesn't have fax   Provider patient wants to be referred to(if known):     Provider Phone Number(if known):     Additional Information for Provider?   ---------------------------------------------------------------------------  --------------  [place]    [phone]; OK to leave message on voicemail  ---------------------------------------------------------------------------  --------------

## 2023-10-27 NOTE — TELEPHONE ENCOUNTER
T/C to patient to inform her that I would send the ANNE-MARIE results to Dr. Paul Garcia office today so that she could get in to be seen by her.

## 2023-10-27 NOTE — TELEPHONE ENCOUNTER
ANNE-MARIE results and progress notes from 08/08/2023 and 03/28/2023 faxed to Dr. Max Martin office and fax confirmed.

## 2023-10-30 DIAGNOSIS — E55.9 HYPOVITAMINOSIS D: ICD-10-CM

## 2023-10-30 NOTE — TELEPHONE ENCOUNTER
Please refill if appropriate or refuse medication if not appropriate.     PCP: Andrea Calix MD     Last appt: 8/8/23    Last refill: 3/28/23      Future Appointments   Date Time Provider [address] [place] [address]   12/7/2023 11:00 AM [facility] LAB VISIT [facility] BS AMB   12/14/2023  1:00 PM Osvaldo Covington MD [facility] BS AMB         Requested Prescriptions     Pending Prescriptions Disp Refills    vitamin D (ERGOCALCIFEROL) 1.25 MG (22919 UT) CAPS capsule [Pharmacy Med Name: VITAMIN D2 1.25MG(50,000 UNIT)] 12 capsule 1     Sig: TAKE 1 CAPSULE BY MOUTH ONE TIME PER WEEK

## 2023-10-31 RX ORDER — ERGOCALCIFEROL 1.25 MG/1
50000 CAPSULE ORAL WEEKLY
Qty: 12 CAPSULE | Refills: 1 | Status: SHIPPED | OUTPATIENT
Start: 2023-10-31

## 2023-11-06 DIAGNOSIS — E78.5 HYPERLIPIDEMIA, UNSPECIFIED HYPERLIPIDEMIA TYPE: ICD-10-CM

## 2023-11-07 RX ORDER — ROSUVASTATIN CALCIUM 5 MG/1
5 TABLET, COATED ORAL
Qty: 90 TABLET | Refills: 3 | Status: SHIPPED | OUTPATIENT
Start: 2023-11-07

## 2023-12-07 ENCOUNTER — HOSPITAL ENCOUNTER (OUTPATIENT)
Facility: HOSPITAL | Age: 63
Setting detail: SPECIMEN
Discharge: HOME | End: 2023-12-07
Payer: COMMERCIAL

## 2023-12-07 DIAGNOSIS — E78.5 HYPERLIPIDEMIA, UNSPECIFIED HYPERLIPIDEMIA TYPE: ICD-10-CM

## 2023-12-07 LAB
ALBUMIN SERPL-MCNC: 4 G/DL (ref 3.4–5)
ALBUMIN/GLOB SERPL: 1.2 (ref 0.8–1.7)
ALP SERPL-CCNC: 111 U/L (ref 45–117)
ALT SERPL-CCNC: 16 U/L (ref 13–56)
AST SERPL-CCNC: 18 U/L (ref 10–38)
BILIRUB DIRECT SERPL-MCNC: <0.1 MG/DL (ref 0–0.2)
BILIRUB SERPL-MCNC: 0.2 MG/DL (ref 0.2–1)
CHOLEST SERPL-MCNC: 284 MG/DL
GLOBULIN SER CALC-MCNC: 3.3 G/DL (ref 2–4)
HDLC SERPL-MCNC: 95 MG/DL (ref 40–60)
HDLC SERPL: 3 (ref 0–5)
LDLC SERPL CALC-MCNC: 161.8 MG/DL (ref 0–100)
LIPID PANEL: ABNORMAL
PROT SERPL-MCNC: 7.3 G/DL (ref 6.4–8.2)
TRIGL SERPL-MCNC: 136 MG/DL
VLDLC SERPL CALC-MCNC: 27.2 MG/DL

## 2023-12-07 PROCEDURE — 80061 LIPID PANEL: CPT

## 2023-12-07 PROCEDURE — 80076 HEPATIC FUNCTION PANEL: CPT

## 2023-12-07 PROCEDURE — 36415 COLL VENOUS BLD VENIPUNCTURE: CPT

## 2023-12-11 ENCOUNTER — HOSPITAL ENCOUNTER (OUTPATIENT)
Facility: HOSPITAL | Age: 63
Discharge: HOME | End: 2023-12-14
Attending: INTERNAL MEDICINE
Payer: COMMERCIAL

## 2023-12-11 DIAGNOSIS — Z87.891 PERSONAL HISTORY OF TOBACCO USE: ICD-10-CM

## 2023-12-11 PROCEDURE — 71271 CT THORAX LUNG CANCER SCR C-: CPT

## 2023-12-14 ENCOUNTER — OFFICE VISIT (OUTPATIENT)
Age: 63
End: 2023-12-14
Payer: COMMERCIAL

## 2023-12-14 VITALS
OXYGEN SATURATION: 100 % | HEIGHT: 64 IN | BODY MASS INDEX: 23.39 KG/M2 | DIASTOLIC BLOOD PRESSURE: 73 MMHG | RESPIRATION RATE: 16 BRPM | WEIGHT: 137 LBS | HEART RATE: 90 BPM | TEMPERATURE: 97.8 F | SYSTOLIC BLOOD PRESSURE: 135 MMHG

## 2023-12-14 DIAGNOSIS — E78.5 HYPERLIPIDEMIA, UNSPECIFIED HYPERLIPIDEMIA TYPE: ICD-10-CM

## 2023-12-14 DIAGNOSIS — Z23 ENCOUNTER FOR IMMUNIZATION: Primary | ICD-10-CM

## 2023-12-14 DIAGNOSIS — I10 PRIMARY HYPERTENSION: ICD-10-CM

## 2023-12-14 PROCEDURE — 90674 CCIIV4 VAC NO PRSV 0.5 ML IM: CPT | Performed by: INTERNAL MEDICINE

## 2023-12-14 PROCEDURE — 3075F SYST BP GE 130 - 139MM HG: CPT | Performed by: INTERNAL MEDICINE

## 2023-12-14 PROCEDURE — 90471 IMMUNIZATION ADMIN: CPT | Performed by: INTERNAL MEDICINE

## 2023-12-14 PROCEDURE — 3078F DIAST BP <80 MM HG: CPT | Performed by: INTERNAL MEDICINE

## 2023-12-14 PROCEDURE — 99214 OFFICE O/P EST MOD 30 MIN: CPT | Performed by: INTERNAL MEDICINE

## 2023-12-14 RX ORDER — ROSUVASTATIN CALCIUM 20 MG/1
20 TABLET, COATED ORAL DAILY
Qty: 30 TABLET | Refills: 5 | Status: SHIPPED | OUTPATIENT
Start: 2023-12-14

## 2023-12-14 ASSESSMENT — PATIENT HEALTH QUESTIONNAIRE - PHQ9
SUM OF ALL RESPONSES TO PHQ QUESTIONS 1-9: 0
SUM OF ALL RESPONSES TO PHQ QUESTIONS 1-9: 0
2. FEELING DOWN, DEPRESSED OR HOPELESS: 0
SUM OF ALL RESPONSES TO PHQ QUESTIONS 1-9: 0
SUM OF ALL RESPONSES TO PHQ QUESTIONS 1-9: 0
SUM OF ALL RESPONSES TO PHQ9 QUESTIONS 1 & 2: 0
1. LITTLE INTEREST OR PLEASURE IN DOING THINGS: 0

## 2023-12-14 NOTE — PROGRESS NOTES
Rx Refill Note  Requested Prescriptions     Pending Prescriptions Disp Refills    ALPRAZolam (Xanax) 0.5 MG tablet 30 tablet 0     Sig: Take 1 tablet by mouth Daily.      Last office visit with prescribing clinician: 3/16/2023   Last telemedicine visit with prescribing clinician: 10/11/2023   Next office visit with prescribing clinician: Visit date not found                         Would you like a call back once the refill request has been completed: [] Yes [] No    If the office needs to give you a call back, can they leave a voicemail: [] Yes [] No    Gabi Torres LPN  12/14/23, 08:29 EST    
[facility] Progress Note    Date: 2021    Name: Kali Garcia    MRN: [id number]         : 1960       WEEKLY Cynthia Cano, DOSE 1 OF 2      Ms. Aylin Peralta arrived to [address] [place] [address] at 9264. Ms. Aylin Peralta was assessed and education was provided. Ms. Singh's vitals were reviewed. Visit Vitals  BP (!) 148/85 (BP 1 Location: Right arm, BP Patient Position: Sitting)   Pulse (!) 104   Temp 98.8 °F (37.1 °C)   Resp 18   SpO2 97%   Breastfeeding No       22g IV inserted in patient's left AC x1 attempt. Brisk blood return/ flushes without difficulty. Injectafer 750mg in 250ml NS administered as ordered followed by NS flush. Ms. Aylin Peralta tolerated well without complaints. VSS. No s/sx of adverse reaction. Pt stayed for 30 minutes observation following completion of infusion. Pt denies complaints. VSS. No s/sx of adverse reaction. Discharge/ follow-up instructions discussed w/ pt. Pt verbalized understanding. IV removed/ intact. Gauze/ coban to site. Pt armband removed & shredded. Ms. Aylin Peralta was discharged from [place] [address] in stable condition at 1530. She is to return on 3/25/21 at 1400 for her next appointment.     Neda Camarena RN  2021
Good

## 2023-12-14 NOTE — PROGRESS NOTES
1. \"Have you been to the ER, urgent care clinic since your last visit? Hospitalized since your last visit? \" No    2. \"Have you seen or consulted any other health care providers outside of the [address] [place] [address] since your last visit? \" No     3. For patients aged 43-73: Has the patient had a colonoscopy / FIT/ Cologuard? No      If the patient is female:    4. For patients aged 43-66: Has the patient had a mammogram within the past 2 years? No      5. For patients aged 21-65: Has the patient had a pap smear?  NA - based on age or sex
4  From 9/20 showed                 chol 275, tg 113, hdl 82, ldl-c 170, wbc 4.7, hb 11.4, plt 238, hba1c 4.5,                fe 91, %sat 29,   From 1/21 showed gluc 85, cr 0.75, gfr>60, alt 16,   chol 247, tg 126, hdl 94, ldl-c 128, wbc 4.5, hb 11.9, plt 331,              fe 42, %sat 12  From 3/21 showed                             fe 29, %sat 8,  ferritin 5, b12 329, fol 4.1, hapto 162, ldh 152  From 5/21 showed gluc 86, cr 0.69, gfr>60, alt 26,   chol 243, tg 158, hdl 70, ldl-c 141, wbc 5.5, hb 12.4, plt 260  From 9/21 showed                 chol 179, tg 144, hdl 72, ldl-c 78,                  tsh 0.88, ua neg, ft4 0.90  From 3/22 showed gluc 96, cr 0.77, gfr>60, alt 140, chol 181, tg 172, hdl 75, ldl-c 72,                        ast 147, ap 120, tb 0.3  From 4/22 showed     alt 21,                   ast 18,   ap 86, tb 0.2  From 8/22 showed           cr 0.87,   alt 21,              wbc 5.7, hb 10.6, plt 368,              fe 7,  %sat 28, ferritin 8, ast 22,   ap 95, tb 0.4  From 9/22 showed      chol 174, tg 133, hdl 74, ldl-c 73,       tsh 0.75,        ft4 0.70  From 3/23 showed gluc 89, cr 0.80. gfr>60, alt 66,   chol 199, tg 194, hdl 76, ldl-c 84,   wbc 4.2, hb 13.4, plt 257,              fe 77, %sat 25, ferritin 45, hep b/c-, cerulo 28.6, ama neg, kay 1:1280  From 6/23 showed                   wbc 4.1, hb 12.6, plt 281,              fe 55, %sat 15, ferritin 19  From 7/23 showed gluc 89, cr 0.97, gfr>60, alt 13,   chol 269, tg 131, hdl 89, ldl-c 154    Results for orders placed or performed during the hospital encounter of 12/11/23 (from the past 2160 hour(s))   CT Lung Screen (Annual/Baseline)    Impression    No lung nodule identified.    - Lung-RADS Category:  1. Negative. - Management Recommendation:  Low dose screening CT in 12 months.    Results for orders placed or performed during the hospital encounter of 12/07/23 (from the past 2160 hour(s))   Lipid Panel   Result Value Ref Range    LIPID PANEL

## 2024-01-17 ENCOUNTER — HOSPITAL ENCOUNTER (OUTPATIENT)
Facility: HOSPITAL | Age: 64
Setting detail: SPECIMEN
Discharge: HOME | End: 2024-01-20
Payer: COMMERCIAL

## 2024-01-17 DIAGNOSIS — E78.5 HYPERLIPIDEMIA, UNSPECIFIED HYPERLIPIDEMIA TYPE: ICD-10-CM

## 2024-01-17 LAB
ALBUMIN SERPL-MCNC: 3.8 G/DL (ref 3.4–5)
ALBUMIN/GLOB SERPL: 1.3 (ref 0.8–1.7)
ALP SERPL-CCNC: 133 U/L (ref 45–117)
ALT SERPL-CCNC: 30 U/L (ref 13–56)
AST SERPL-CCNC: 39 U/L (ref 10–38)
BILIRUB DIRECT SERPL-MCNC: <0.1 MG/DL (ref 0–0.2)
BILIRUB SERPL-MCNC: 0.3 MG/DL (ref 0.2–1)
GLOBULIN SER CALC-MCNC: 2.9 G/DL (ref 2–4)
PROT SERPL-MCNC: 6.7 G/DL (ref 6.4–8.2)

## 2024-01-17 PROCEDURE — 36415 COLL VENOUS BLD VENIPUNCTURE: CPT

## 2024-01-17 PROCEDURE — 80076 HEPATIC FUNCTION PANEL: CPT

## 2024-03-02 DIAGNOSIS — I10 ESSENTIAL (PRIMARY) HYPERTENSION: ICD-10-CM

## 2024-03-03 DIAGNOSIS — E55.9 HYPOVITAMINOSIS D: ICD-10-CM

## 2024-03-04 RX ORDER — AMLODIPINE BESYLATE 5 MG/1
TABLET ORAL
Qty: 90 TABLET | Refills: 3 | Status: SHIPPED | OUTPATIENT
Start: 2024-03-04

## 2024-03-04 RX ORDER — ERGOCALCIFEROL 1.25 MG/1
50000 CAPSULE ORAL WEEKLY
Qty: 12 CAPSULE | Refills: 1 | Status: SHIPPED | OUTPATIENT
Start: 2024-03-04

## 2024-03-26 DIAGNOSIS — E78.5 HYPERLIPIDEMIA, UNSPECIFIED HYPERLIPIDEMIA TYPE: ICD-10-CM

## 2024-03-27 RX ORDER — ROSUVASTATIN CALCIUM 20 MG/1
20 TABLET, COATED ORAL DAILY
Qty: 90 TABLET | Refills: 3 | Status: SHIPPED | OUTPATIENT
Start: 2024-03-27

## 2024-04-15 ENCOUNTER — HOSPITAL ENCOUNTER (OUTPATIENT)
Facility: HOSPITAL | Age: 64
Setting detail: SPECIMEN
Discharge: HOME | End: 2024-04-18
Payer: COMMERCIAL

## 2024-04-15 DIAGNOSIS — E78.5 HYPERLIPIDEMIA, UNSPECIFIED HYPERLIPIDEMIA TYPE: ICD-10-CM

## 2024-04-15 LAB
ALBUMIN SERPL-MCNC: 3.7 G/DL (ref 3.4–5)
ALBUMIN/GLOB SERPL: 1.2 (ref 0.8–1.7)
ALP SERPL-CCNC: 111 U/L (ref 45–117)
ALT SERPL-CCNC: 20 U/L (ref 13–56)
ANION GAP SERPL CALC-SCNC: 3 MMOL/L (ref 3–18)
AST SERPL-CCNC: 20 U/L (ref 10–38)
BILIRUB SERPL-MCNC: 0.2 MG/DL (ref 0.2–1)
BUN SERPL-MCNC: 15 MG/DL (ref 7–18)
BUN/CREAT SERPL: 21 (ref 12–20)
CALCIUM SERPL-MCNC: 9 MG/DL (ref 8.5–10.1)
CHLORIDE SERPL-SCNC: 108 MMOL/L (ref 100–111)
CHOLEST SERPL-MCNC: 203 MG/DL
CO2 SERPL-SCNC: 29 MMOL/L (ref 21–32)
CREAT SERPL-MCNC: 0.71 MG/DL (ref 0.6–1.3)
GLOBULIN SER CALC-MCNC: 3.1 G/DL (ref 2–4)
GLUCOSE SERPL-MCNC: 87 MG/DL (ref 74–99)
HDLC SERPL-MCNC: 76 MG/DL (ref 40–60)
HDLC SERPL: 2.7 (ref 0–5)
LDLC SERPL CALC-MCNC: 89.8 MG/DL (ref 0–100)
LIPID PANEL: ABNORMAL
POTASSIUM SERPL-SCNC: 4.1 MMOL/L (ref 3.5–5.5)
PROT SERPL-MCNC: 6.8 G/DL (ref 6.4–8.2)
SODIUM SERPL-SCNC: 140 MMOL/L (ref 136–145)
TRIGL SERPL-MCNC: 186 MG/DL
VLDLC SERPL CALC-MCNC: 37.2 MG/DL

## 2024-04-15 PROCEDURE — 80061 LIPID PANEL: CPT

## 2024-04-15 PROCEDURE — 36415 COLL VENOUS BLD VENIPUNCTURE: CPT

## 2024-04-15 PROCEDURE — 80053 COMPREHEN METABOLIC PANEL: CPT

## 2024-04-22 ENCOUNTER — OFFICE VISIT (OUTPATIENT)
Age: 64
End: 2024-04-22
Payer: COMMERCIAL

## 2024-04-22 VITALS
HEART RATE: 107 BPM | WEIGHT: 135 LBS | SYSTOLIC BLOOD PRESSURE: 132 MMHG | BODY MASS INDEX: 23.05 KG/M2 | DIASTOLIC BLOOD PRESSURE: 80 MMHG | HEIGHT: 64 IN | OXYGEN SATURATION: 97 % | TEMPERATURE: 98.8 F | RESPIRATION RATE: 16 BRPM

## 2024-04-22 DIAGNOSIS — Z00.00 PHYSICAL EXAM: Primary | ICD-10-CM

## 2024-04-22 DIAGNOSIS — Z23 ENCOUNTER FOR IMMUNIZATION: ICD-10-CM

## 2024-04-22 DIAGNOSIS — I10 ESSENTIAL (PRIMARY) HYPERTENSION: ICD-10-CM

## 2024-04-22 DIAGNOSIS — I10 PRIMARY HYPERTENSION: ICD-10-CM

## 2024-04-22 DIAGNOSIS — Z12.31 SCREENING MAMMOGRAM FOR BREAST CANCER: ICD-10-CM

## 2024-04-22 DIAGNOSIS — E78.5 HYPERLIPIDEMIA, UNSPECIFIED HYPERLIPIDEMIA TYPE: ICD-10-CM

## 2024-04-22 DIAGNOSIS — E55.9 HYPOVITAMINOSIS D: ICD-10-CM

## 2024-04-22 DIAGNOSIS — D50.9 IRON DEFICIENCY ANEMIA, UNSPECIFIED IRON DEFICIENCY ANEMIA TYPE: ICD-10-CM

## 2024-04-22 DIAGNOSIS — F17.200 TOBACCO DEPENDENCE SYNDROME: ICD-10-CM

## 2024-04-22 PROCEDURE — 99396 PREV VISIT EST AGE 40-64: CPT | Performed by: INTERNAL MEDICINE

## 2024-04-22 PROCEDURE — 3079F DIAST BP 80-89 MM HG: CPT | Performed by: INTERNAL MEDICINE

## 2024-04-22 PROCEDURE — 90677 PCV20 VACCINE IM: CPT | Performed by: INTERNAL MEDICINE

## 2024-04-22 PROCEDURE — 90471 IMMUNIZATION ADMIN: CPT | Performed by: INTERNAL MEDICINE

## 2024-04-22 PROCEDURE — 3075F SYST BP GE 130 - 139MM HG: CPT | Performed by: INTERNAL MEDICINE

## 2024-04-22 NOTE — PROGRESS NOTES
Jennifer Velasquez presents today for   Chief Complaint   Patient presents with    Follow-up     4 month; discuss labs       \"Have you been to the ER, urgent care clinic since your last visit?  Hospitalized since your last visit?\"    NO    “Have you seen or consulted any other health care providers outside of [facility] since your last visit?”    YES - When: approximately 2 months ago.  Where and Why: [facility], iron deficiency anemia.       Have you had a mammogram?”   NO    Date of last Mammogram: 1/21/2021      “Have you had a pap smear?”    NO    No cervical cancer screening on file           
Verbal order read back per Dr. Covington.  Patient received Prevnar 20 vaccine in left deltoid.  Patient tolerated well and left without complaints.  Patient received VIS.     
  Result Value Ref Range    Sodium 140 136 - 145 mmol/L    Potassium 4.1 3.5 - 5.5 mmol/L    Chloride 108 100 - 111 mmol/L    CO2 29 21 - 32 mmol/L    Anion Gap 3 3.0 - 18 mmol/L    Glucose 87 74 - 99 mg/dL    BUN 15 7.0 - 18 MG/DL    Creatinine 0.71 0.6 - 1.3 MG/DL    Bun/Cre Ratio 21 (H) 12 - 20      Est, Glom Filt Rate >90 >60 ml/min/1.73m2    Calcium 9.0 8.5 - 10.1 MG/DL    Total Bilirubin 0.2 0.2 - 1.0 MG/DL    ALT 20 13 - 56 U/L    AST 20 10 - 38 U/L    Alk Phosphatase 111 45 - 117 U/L    Total Protein 6.8 6.4 - 8.2 g/dL    Albumin 3.7 3.4 - 5.0 g/dL    Globulin 3.1 2.0 - 4.0 g/dL    Albumin/Globulin Ratio 1.2 0.8 - 1.7     Lipid Panel   Result Value Ref Range    LIPID PANEL        Cholesterol, Total 203 (H) <200 MG/DL    Triglycerides 186 (H) <150 MG/DL    HDL 76 (H) 40 - 60 MG/DL    LDL Calculated 89.8 0 - 100 MG/DL    VLDL Cholesterol Calculated 37.2 MG/DL    Chol/HDL Ratio 2.7 0 - 5.0       We reviewed the patient's labs from the last several visits to point out trends in the numbers        Patient Active Problem List   Diagnosis    Generalized OA    Chronic pain syndrome    Primary hypertension    Tobacco dependence syndrome    Hyperlipidemia    Anxiety    Degeneration of lumbar or lumbosacral intervertebral disc    Iron deficiency anemia    Hypovitaminosis D         Assessment and plan:  1.  Allergic rhinitis.  Prn meds  2.  Chronic pain syndrome.  F/U Dr Mauro   3.  Anxiety and depression. Continue effexor, buspar and wellbutrin  4.  Hyperlipidemia.doing well on crestor  5.  H/O PUD and GERD.  PPI and avoidance measures indefinitely  6.  HTN.  Continue amlo and controlled  7.  Smoking cessation meds declined.  LDCT 12/24  8.  Vit d def.  Supplementation   9.  Anemia.  Follow up hematology  10.  Prevnar 20 given, advocated RSV  11.  Screening mammo ordered      RTC 10/24      Above conditions discussed at length and patient vocalized understanding.  All questions answered to patient satisfaction

## 2024-05-01 ENCOUNTER — HOSPITAL ENCOUNTER (OUTPATIENT)
Facility: HOSPITAL | Age: 64
Discharge: HOME | End: 2024-05-04
Payer: COMMERCIAL

## 2024-05-01 VITALS — WEIGHT: 135 LBS | BODY MASS INDEX: 23.05 KG/M2 | HEIGHT: 64 IN

## 2024-05-01 DIAGNOSIS — Z12.31 SCREENING MAMMOGRAM FOR BREAST CANCER: ICD-10-CM

## 2024-05-01 PROCEDURE — 77063 BREAST TOMOSYNTHESIS BI: CPT

## 2024-10-03 DIAGNOSIS — E55.9 HYPOVITAMINOSIS D: ICD-10-CM

## 2024-10-03 RX ORDER — ERGOCALCIFEROL 1.25 MG/1
50000 CAPSULE, LIQUID FILLED ORAL WEEKLY
Qty: 12 CAPSULE | Refills: 1 | Status: SHIPPED | OUTPATIENT
Start: 2024-10-03

## 2024-10-16 ENCOUNTER — HOSPITAL ENCOUNTER (OUTPATIENT)
Facility: HOSPITAL | Age: 64
Setting detail: SPECIMEN
Discharge: HOME | End: 2024-10-19
Payer: COMMERCIAL

## 2024-10-16 DIAGNOSIS — E78.5 HYPERLIPIDEMIA, UNSPECIFIED HYPERLIPIDEMIA TYPE: ICD-10-CM

## 2024-10-16 DIAGNOSIS — D50.9 IRON DEFICIENCY ANEMIA, UNSPECIFIED IRON DEFICIENCY ANEMIA TYPE: ICD-10-CM

## 2024-10-16 LAB
BASOPHILS # BLD: 0 K/UL (ref 0–0.1)
BASOPHILS NFR BLD: 1 % (ref 0–2)
CHOLEST SERPL-MCNC: 207 MG/DL
DIFFERENTIAL METHOD BLD: ABNORMAL
EOSINOPHIL # BLD: 0.1 K/UL (ref 0–0.4)
EOSINOPHIL NFR BLD: 3 % (ref 0–5)
ERYTHROCYTE [DISTWIDTH] IN BLOOD BY AUTOMATED COUNT: 11.6 % (ref 11.6–14.5)
HCT VFR BLD AUTO: 43.3 % (ref 35–45)
HDLC SERPL-MCNC: 72 MG/DL (ref 40–60)
HDLC SERPL: 2.9 (ref 0–5)
HGB BLD-MCNC: 13.6 G/DL (ref 12–16)
IMM GRANULOCYTES # BLD AUTO: 0 K/UL (ref 0–0.04)
IMM GRANULOCYTES NFR BLD AUTO: 1 % (ref 0–0.5)
LDLC SERPL CALC-MCNC: 105 MG/DL (ref 0–100)
LIPID PANEL: ABNORMAL
LYMPHOCYTES # BLD: 1.7 K/UL (ref 0.9–3.6)
LYMPHOCYTES NFR BLD: 37 % (ref 21–52)
MCH RBC QN AUTO: 33.1 PG (ref 24–34)
MCHC RBC AUTO-ENTMCNC: 31.4 G/DL (ref 31–37)
MCV RBC AUTO: 105.4 FL (ref 78–100)
MONOCYTES # BLD: 0.3 K/UL (ref 0.05–1.2)
MONOCYTES NFR BLD: 7 % (ref 3–10)
NEUTS SEG # BLD: 2.3 K/UL (ref 1.8–8)
NEUTS SEG NFR BLD: 51 % (ref 40–73)
NRBC # BLD: 0 K/UL (ref 0–0.01)
NRBC BLD-RTO: 0 PER 100 WBC
PLATELET # BLD AUTO: 260 K/UL (ref 135–420)
PMV BLD AUTO: 10 FL (ref 9.2–11.8)
RBC # BLD AUTO: 4.11 M/UL (ref 4.2–5.3)
TRIGL SERPL-MCNC: 150 MG/DL
VLDLC SERPL CALC-MCNC: 30 MG/DL
WBC # BLD AUTO: 4.4 K/UL (ref 4.6–13.2)

## 2024-10-16 PROCEDURE — 80061 LIPID PANEL: CPT

## 2024-10-16 PROCEDURE — 85025 COMPLETE CBC W/AUTO DIFF WBC: CPT

## 2024-10-16 PROCEDURE — 36415 COLL VENOUS BLD VENIPUNCTURE: CPT

## 2024-10-22 ENCOUNTER — OFFICE VISIT (OUTPATIENT)
Facility: CLINIC | Age: 64
End: 2024-10-22
Payer: COMMERCIAL

## 2024-10-22 VITALS
RESPIRATION RATE: 16 BRPM | SYSTOLIC BLOOD PRESSURE: 135 MMHG | TEMPERATURE: 98.2 F | OXYGEN SATURATION: 97 % | BODY MASS INDEX: 22.36 KG/M2 | DIASTOLIC BLOOD PRESSURE: 82 MMHG | HEIGHT: 64 IN | HEART RATE: 87 BPM | WEIGHT: 131 LBS

## 2024-10-22 DIAGNOSIS — F17.200 TOBACCO DEPENDENCE SYNDROME: ICD-10-CM

## 2024-10-22 DIAGNOSIS — R07.9 CHEST PAIN, UNSPECIFIED TYPE: ICD-10-CM

## 2024-10-22 DIAGNOSIS — F41.9 ANXIETY: ICD-10-CM

## 2024-10-22 DIAGNOSIS — M54.40 CHRONIC BILATERAL LOW BACK PAIN WITH SCIATICA, SCIATICA LATERALITY UNSPECIFIED: ICD-10-CM

## 2024-10-22 DIAGNOSIS — E78.5 HYPERLIPIDEMIA, UNSPECIFIED HYPERLIPIDEMIA TYPE: ICD-10-CM

## 2024-10-22 DIAGNOSIS — I10 PRIMARY HYPERTENSION: ICD-10-CM

## 2024-10-22 DIAGNOSIS — G89.29 CHRONIC BILATERAL LOW BACK PAIN WITH SCIATICA, SCIATICA LATERALITY UNSPECIFIED: ICD-10-CM

## 2024-10-22 DIAGNOSIS — Z23 NEED FOR IMMUNIZATION AGAINST INFLUENZA: Primary | ICD-10-CM

## 2024-10-22 PROCEDURE — 3079F DIAST BP 80-89 MM HG: CPT | Performed by: INTERNAL MEDICINE

## 2024-10-22 PROCEDURE — 99215 OFFICE O/P EST HI 40 MIN: CPT | Performed by: INTERNAL MEDICINE

## 2024-10-22 PROCEDURE — 3075F SYST BP GE 130 - 139MM HG: CPT | Performed by: INTERNAL MEDICINE

## 2024-10-22 PROCEDURE — 90471 IMMUNIZATION ADMIN: CPT | Performed by: INTERNAL MEDICINE

## 2024-10-22 PROCEDURE — 93000 ELECTROCARDIOGRAM COMPLETE: CPT | Performed by: INTERNAL MEDICINE

## 2024-10-22 PROCEDURE — 90661 CCIIV3 VAC ABX FR 0.5 ML IM: CPT | Performed by: INTERNAL MEDICINE

## 2024-10-22 RX ORDER — AMLODIPINE BESYLATE 10 MG/1
10 TABLET ORAL DAILY
Qty: 90 TABLET | Refills: 3 | Status: SHIPPED | OUTPATIENT
Start: 2024-10-22

## 2024-10-22 SDOH — ECONOMIC STABILITY: FOOD INSECURITY: WITHIN THE PAST 12 MONTHS, YOU WORRIED THAT YOUR FOOD WOULD RUN OUT BEFORE YOU GOT MONEY TO BUY MORE.: NEVER TRUE

## 2024-10-22 SDOH — ECONOMIC STABILITY: FOOD INSECURITY: WITHIN THE PAST 12 MONTHS, THE FOOD YOU BOUGHT JUST DIDN'T LAST AND YOU DIDN'T HAVE MONEY TO GET MORE.: NEVER TRUE

## 2024-10-22 SDOH — ECONOMIC STABILITY: INCOME INSECURITY: HOW HARD IS IT FOR YOU TO PAY FOR THE VERY BASICS LIKE FOOD, HOUSING, MEDICAL CARE, AND HEATING?: NOT HARD AT ALL

## 2024-10-22 ASSESSMENT — PATIENT HEALTH QUESTIONNAIRE - PHQ9
SUM OF ALL RESPONSES TO PHQ QUESTIONS 1-9: 0
SUM OF ALL RESPONSES TO PHQ QUESTIONS 1-9: 0
1. LITTLE INTEREST OR PLEASURE IN DOING THINGS: NOT AT ALL
SUM OF ALL RESPONSES TO PHQ QUESTIONS 1-9: 0
SUM OF ALL RESPONSES TO PHQ QUESTIONS 1-9: 0
SUM OF ALL RESPONSES TO PHQ9 QUESTIONS 1 & 2: 0
2. FEELING DOWN, DEPRESSED OR HOPELESS: NOT AT ALL

## 2024-10-22 NOTE — PROGRESS NOTES
Jennifer Velasquez presents today for   Chief Complaint   Patient presents with    6 Month Follow-Up    Hypertension       \"Have you been to the ER, urgent care clinic since your last visit?  Hospitalized since your last visit?\"    YES - When: approximately 3 months ago.  Where and Why: [facility], fracture left foot.    “Have you seen or consulted any other health care providers outside of [facility] since your last visit?”    YES - When: approximately 1 months ago.  Where and Why: [facility], closed fracture of fifth metatarsal bone of left foot and distal end of fibula.        “Have you had a pap smear?”    NO    No cervical cancer screening on file           
Verbal order read back per Dr. Covington.  Patient received Influenza vaccine in LEFT deltoid.  Patient tolerated well and left without complaints.  Patient received VIS.     
pyloric ulcer (7/2/20)    Tension headache, chronic     uses fioricet    Tobacco dependence syndrome     declined meds and unable to stop due to stressors; LDCT neg (1/21), (4/22), (12/23)    Vitamin D deficiency      Past Surgical History:   Procedure Laterality Date    BREAST BIOPSY Left 3/5/2021    LEFT BREAST EXCISIONAL  BIOPSY performed by Shell De La Torre MD at [facility] MAIN OR    COLONOSCOPY N/A     Dr Teran (3/28/11) neg; Dr Foster (7/2/20) neg    ESOPHAGOGASTRODUODENOSCOPY      Dr Foster 10/30/20 hp neg    FOOT SURGERY Right 1995    Dr. 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   Tobacco Use    Smoking status: Every Day     Current packs/day: 0.75     Average packs/day: 0.8 packs/day for 40.0 years (30.0 ttl pk-yrs)     Types: Cigarettes     Passive exposure: Current    Smokeless tobacco: Never   Vaping Use    Vaping status: Never Used   Substance and Sexual Activity    Alcohol use: No    Drug use: Never    Sexual activity: Not on file   Other Topics Concern    Not on file   Social History Narrative    Not on file     Social Determinants of Health     Financial Resource Strain: Low Risk  (10/22/2024)    Overall Financial Resource Strain (CARDIA)     Difficulty of Paying Living Expenses: Not hard at all   Food Insecurity: No Food Insecurity (10/22/2024)    Hunger Vital Sign     Worried About Running Out of Food in the Last Year: Never true     Ran Out of Food in the Last Year: Never true   Transportation Needs: Unknown (10/22/2024)    PRAPARE - Transportation     Lack of Transportation (Medical): Not on file     Lack of Transportation (Non-Medical): No   Physical Activity: Not

## 2025-01-06 SDOH — HEALTH STABILITY: PHYSICAL HEALTH
ON AVERAGE, HOW MANY DAYS PER WEEK DO YOU ENGAGE IN MODERATE TO STRENUOUS EXERCISE (LIKE A BRISK WALK)?: PATIENT DECLINED

## 2025-01-07 ENCOUNTER — OFFICE VISIT (OUTPATIENT)
Age: 65
End: 2025-01-07
Payer: COMMERCIAL

## 2025-01-07 VITALS
DIASTOLIC BLOOD PRESSURE: 70 MMHG | OXYGEN SATURATION: 97 % | WEIGHT: 136.8 LBS | HEART RATE: 94 BPM | HEIGHT: 64 IN | BODY MASS INDEX: 23.35 KG/M2 | SYSTOLIC BLOOD PRESSURE: 137 MMHG | TEMPERATURE: 98.3 F

## 2025-01-07 DIAGNOSIS — G89.4 CHRONIC PAIN SYNDROME: ICD-10-CM

## 2025-01-07 DIAGNOSIS — M47.816 LUMBAR FACET ARTHROPATHY: ICD-10-CM

## 2025-01-07 DIAGNOSIS — M25.552 LEFT HIP PAIN: ICD-10-CM

## 2025-01-07 DIAGNOSIS — R29.898 WEAKNESS OF LEFT LOWER EXTREMITY: ICD-10-CM

## 2025-01-07 DIAGNOSIS — Z87.19 HISTORY OF GI BLEED: ICD-10-CM

## 2025-01-07 DIAGNOSIS — M51.360 DEGENERATION OF INTERVERTEBRAL DISC OF LUMBAR REGION WITH DISCOGENIC BACK PAIN: ICD-10-CM

## 2025-01-07 DIAGNOSIS — M54.50 LUMBAR PAIN: Primary | ICD-10-CM

## 2025-01-07 PROCEDURE — 3075F SYST BP GE 130 - 139MM HG: CPT | Performed by: PHYSICAL MEDICINE & REHABILITATION

## 2025-01-07 PROCEDURE — 72110 X-RAY EXAM L-2 SPINE 4/>VWS: CPT | Performed by: PHYSICAL MEDICINE & REHABILITATION

## 2025-01-07 PROCEDURE — 3078F DIAST BP <80 MM HG: CPT | Performed by: PHYSICAL MEDICINE & REHABILITATION

## 2025-01-07 PROCEDURE — 99203 OFFICE O/P NEW LOW 30 MIN: CPT | Performed by: PHYSICAL MEDICINE & REHABILITATION

## 2025-01-07 NOTE — PATIENT INSTRUCTIONS
seconds.  Repeat 8 to 12 times.  Hip extension  Get down on your hands and knees on the floor.  Keeping your back and neck straight, lift one leg straight out behind you. When you lift your leg, keep your hips level. Don't let your back twist, and don't let your hip drop toward the floor.  Hold for 6 seconds. Repeat 8 to 12 times with each leg.  If you feel steady and strong when you do this exercise, you can make it more difficult. To do this, when you lift your leg, also lift the opposite arm straight out in front of you. For example, lift the left leg and the right arm at the same time. (This is sometimes called the \"bird dog exercise.\") Hold for 6 seconds, and repeat 8 to 12 times on each side.  Clamshell  Lie on your side with a pillow under your head. Keep your feet and knees together and your knees bent.  Raise your top knee, but keep your feet together. Do not let your hips roll back. Your legs should open up like a clamshell.  Hold for 6 seconds.  Slowly lower your knee back down. Rest for 10 seconds.  Repeat 8 to 12 times.  Switch to your other side and repeat steps 1 through 5.  Hamstring wall stretch  Lie on your back in a doorway, with one leg through the open door.  Slide your affected leg up the wall to straighten your knee. You should feel a gentle stretch down the back of your leg.  Hold the stretch for at least 1 minute to begin. Then try to lengthen the time you hold the stretch to as long as 6 minutes.  Switch legs, and repeat steps 1 through 3.  Repeat 2 to 4 times.  If you do not have a place to do this exercise in a doorway, there is another way to do it:  Lie on your back, and bend one knee.  Loop a towel under the ball and toes of that foot, and hold the ends of the towel in your hands.  Straighten your knee, and slowly pull back on the towel. You should feel a gentle stretch down the back of your leg.  Switch legs, and repeat steps 1 through 3.  Repeat 2 to 4 times.  Do not arch your

## 2025-01-07 NOTE — PROGRESS NOTES
Jennifer Velasquez presents today for   Chief Complaint   Patient presents with    Back Pain     LBP LLE to foot       Is someone accompanying this pt? no    Is the patient using any DME equipment during OV? no      Coordination of Care:  1. Have you been to the ER, urgent care clinic since your last visit? no  Hospitalized since your last visit? no    2. Have you seen or consulted any other health care providers outside of the [facility] since your last visit? no Include any pap smears or colon screening. no    
present  Neurological: 1+ symmetrical DTRs in the upper extremities. 1+ symmetrical DTRs in the lower extremities. Sensation to light touch is intact. Negative Herrera's sign bilaterally.  Skin: warm, dry, and intact.   Musculoskeletal: Moderate pain with extension, axial loading, or forward flexion.  Mild to moderate pain with internal or external rotation of her hips. Negative straight leg raise bilaterally. No pain with heel or toe walking. No difficulty with the single leg stance on the right. Unable to do a single leg stance on the left.       Biceps  Triceps Deltoids Wrist Ext Wrist Flex Hand Intrin   Right +4/5 +4/5 +4/5 +4/5 +4/5 +4/5   Left +4/5 +4/5 +4/5 +4/5 +4/5 +4/5      Hip Flex  Quads Hamstrings Ankle DF EHL Ankle PF   Right +4/5 +4/5 +4/5 +4/5 +4/5 +4/5   Left +4/5 +4/5 +4/5 +4/5 -4/5 +4/5     IMAGING:    Lumbar Spine 4V X-ray images from 1/7/25 were personally reviewed with the patient and demonstrated:   Dextroscoliosis  DJD hips   Atherosclerosis  Multi level deg facets  DDD L5/S1, L4/5    Written by Venessa Davis as dictated by Lanette Eduardo MD.  I, Dr. Lanette Eduardo confirm that all documentation is accurate.

## 2025-02-17 DIAGNOSIS — E55.9 HYPOVITAMINOSIS D: ICD-10-CM

## 2025-02-17 RX ORDER — ERGOCALCIFEROL 1.25 MG/1
50000 CAPSULE, LIQUID FILLED ORAL WEEKLY
Qty: 12 CAPSULE | Refills: 1 | Status: SHIPPED | OUTPATIENT
Start: 2025-02-17

## 2025-04-15 ENCOUNTER — HOSPITAL ENCOUNTER (OUTPATIENT)
Facility: HOSPITAL | Age: 65
Setting detail: SPECIMEN
Discharge: HOME | End: 2025-04-18
Payer: COMMERCIAL

## 2025-04-15 DIAGNOSIS — I10 PRIMARY HYPERTENSION: ICD-10-CM

## 2025-04-15 DIAGNOSIS — E78.5 HYPERLIPIDEMIA, UNSPECIFIED HYPERLIPIDEMIA TYPE: ICD-10-CM

## 2025-04-15 LAB
ANION GAP SERPL CALC-SCNC: 9 MMOL/L (ref 3–18)
APPEARANCE UR: CLEAR
BASOPHILS # BLD: 0.04 K/UL (ref 0–0.1)
BASOPHILS NFR BLD: 0.7 % (ref 0–2)
BILIRUB UR QL: NEGATIVE
BUN SERPL-MCNC: 14 MG/DL (ref 7–18)
BUN/CREAT SERPL: 18 (ref 12–20)
CALCIUM SERPL-MCNC: 9.2 MG/DL (ref 8.5–10.1)
CHLORIDE SERPL-SCNC: 105 MMOL/L (ref 100–111)
CHOLEST SERPL-MCNC: 197 MG/DL
CO2 SERPL-SCNC: 26 MMOL/L (ref 21–32)
COLOR UR: YELLOW
CREAT SERPL-MCNC: 0.79 MG/DL (ref 0.6–1.3)
DIFFERENTIAL METHOD BLD: ABNORMAL
EOSINOPHIL # BLD: 0.08 K/UL (ref 0–0.4)
EOSINOPHIL NFR BLD: 1.3 % (ref 0–5)
ERYTHROCYTE [DISTWIDTH] IN BLOOD BY AUTOMATED COUNT: 12.9 % (ref 11.6–14.5)
GLUCOSE SERPL-MCNC: 82 MG/DL (ref 74–99)
GLUCOSE UR STRIP.AUTO-MCNC: NEGATIVE MG/DL
HCT VFR BLD AUTO: 39.3 % (ref 35–45)
HDLC SERPL-MCNC: 83 MG/DL (ref 40–60)
HDLC SERPL: 2.4 (ref 0–5)
HGB BLD-MCNC: 12.1 G/DL (ref 12–16)
HGB UR QL STRIP: NEGATIVE
IMM GRANULOCYTES # BLD AUTO: 0.02 K/UL (ref 0–0.04)
IMM GRANULOCYTES NFR BLD AUTO: 0.3 % (ref 0–0.5)
KETONES UR QL STRIP.AUTO: NEGATIVE MG/DL
LDLC SERPL CALC-MCNC: 93.6 MG/DL (ref 0–100)
LEUKOCYTE ESTERASE UR QL STRIP.AUTO: NEGATIVE
LIPID PANEL: ABNORMAL
LYMPHOCYTES # BLD: 1.57 K/UL (ref 0.9–3.6)
LYMPHOCYTES NFR BLD: 25.8 % (ref 21–52)
MCH RBC QN AUTO: 30.1 PG (ref 24–34)
MCHC RBC AUTO-ENTMCNC: 30.8 G/DL (ref 31–37)
MCV RBC AUTO: 97.8 FL (ref 78–100)
MONOCYTES # BLD: 0.44 K/UL (ref 0.05–1.2)
MONOCYTES NFR BLD: 7.2 % (ref 3–10)
NEUTS SEG # BLD: 3.93 K/UL (ref 1.8–8)
NEUTS SEG NFR BLD: 64.7 % (ref 40–73)
NITRITE UR QL STRIP.AUTO: NEGATIVE
NRBC # BLD: 0 K/UL (ref 0–0.01)
NRBC BLD-RTO: 0 PER 100 WBC
PH UR STRIP: 5.5 (ref 5–8)
PLATELET # BLD AUTO: 317 K/UL (ref 135–420)
PMV BLD AUTO: 9.9 FL (ref 9.2–11.8)
POTASSIUM SERPL-SCNC: 4.3 MMOL/L (ref 3.5–5.5)
PROT UR STRIP-MCNC: NEGATIVE MG/DL
RBC # BLD AUTO: 4.02 M/UL (ref 4.2–5.3)
SODIUM SERPL-SCNC: 140 MMOL/L (ref 136–145)
SP GR UR REFRACTOMETRY: 1.03 (ref 1–1.03)
T4 FREE SERPL-MCNC: 1.1 NG/DL (ref 0.7–1.5)
TRIGL SERPL-MCNC: 102 MG/DL
TSH SERPL DL<=0.05 MIU/L-ACNC: 1.76 UIU/ML (ref 0.36–3.74)
UROBILINOGEN UR QL STRIP.AUTO: 0.2 EU/DL (ref 0.2–1)
VLDLC SERPL CALC-MCNC: 20.4 MG/DL
WBC # BLD AUTO: 6.1 K/UL (ref 4.6–13.2)

## 2025-04-15 PROCEDURE — 84439 ASSAY OF FREE THYROXINE: CPT

## 2025-04-15 PROCEDURE — 81003 URINALYSIS AUTO W/O SCOPE: CPT

## 2025-04-15 PROCEDURE — 85025 COMPLETE CBC W/AUTO DIFF WBC: CPT

## 2025-04-15 PROCEDURE — 84443 ASSAY THYROID STIM HORMONE: CPT

## 2025-04-15 PROCEDURE — 80061 LIPID PANEL: CPT

## 2025-04-15 PROCEDURE — 36415 COLL VENOUS BLD VENIPUNCTURE: CPT

## 2025-04-15 PROCEDURE — 80048 BASIC METABOLIC PNL TOTAL CA: CPT

## 2025-04-15 NOTE — PROGRESS NOTES
64 y.o. female who presents for evaluation    At the last visit, she was c/o atypical cp so we ordered NST but she did not get it done, sx have resolved.  No exercise, just adls, she retired and is actually helping out her  who has been having cardiac issues    Continues to go to Dr Mauro for pain mgmt.  Saw Dr Eduardo in Jan but did not go for the prescribed therapies.    Stopped seeing SILVESTRE Ryder/onco due to coverage issues and she just wants us to follow her counts    No  or gyn complaints    She was seeing Dr Maldonado for the ankle fx and foot arthritis    Past Medical History:   Diagnosis Date    Acne     Dr. Ramos    Allergic rhinitis     Dr. Eubanks    ANNE-MARIE positive 2023    saw Dr Castaneda, neg eval;    Anemia, iron deficiency 06/2020    Dr Foster; s/p iron infusion Dr Garcia; now SILVESTRE Ryder    Anxiety     intol paxil; has not tried other ssris    Chronic migraine without aura     failed beta blocker, depakote, topamax per pt    Chronic pain     Dr Mauro    COVID-19 vaccine dose declined 04/2024    boosters    COVID-19 virus infection     2022, 2023    Degenerative arthritis of cervical spine     spinal stenosis    Degenerative arthritis of lumbar spine     Dr Mauro mri 2011 showed degen changes L1-L2 and L3-L4, mild to mod bilat foraminal narrowing    Fibromyalgia syndrome     Ganglion cyst     GERD (gastroesophageal reflux disease)     Hyperlipidemia     calculated 10 year risk score 4.6% (9/15); 4.9% (9/16); 7.1% (1/19); 9% (6/20); 11% (9/20)    Hypertension 04/02/2019    OA (osteoarthritis)     feet Dr Maldonado (2024)    Psoriasis     PUD (peptic ulcer disease)     Dr. Teran, duodenal w gi bleed (2011); Dr Foster pyloric ulcer (7/2/20)    Tension headache, chronic     uses fioricet    Tobacco dependence syndrome     declined meds and unable to stop due to stressors; LDCT neg (1/21), (4/22), (12/23)    Vitamin D deficiency      Past Surgical History:   Procedure Laterality Date    ANKLE FRACTURE

## 2025-04-22 ENCOUNTER — OFFICE VISIT (OUTPATIENT)
Facility: CLINIC | Age: 65
End: 2025-04-22
Payer: COMMERCIAL

## 2025-04-22 VITALS
TEMPERATURE: 98.2 F | BODY MASS INDEX: 23.05 KG/M2 | WEIGHT: 135 LBS | RESPIRATION RATE: 16 BRPM | DIASTOLIC BLOOD PRESSURE: 70 MMHG | HEIGHT: 64 IN | OXYGEN SATURATION: 97 % | SYSTOLIC BLOOD PRESSURE: 109 MMHG | HEART RATE: 108 BPM

## 2025-04-22 DIAGNOSIS — I10 PRIMARY HYPERTENSION: Primary | ICD-10-CM

## 2025-04-22 DIAGNOSIS — Z87.891 PERSONAL HISTORY OF TOBACCO USE: ICD-10-CM

## 2025-04-22 DIAGNOSIS — E78.5 HYPERLIPIDEMIA, UNSPECIFIED HYPERLIPIDEMIA TYPE: ICD-10-CM

## 2025-04-22 DIAGNOSIS — G89.4 CHRONIC PAIN SYNDROME: ICD-10-CM

## 2025-04-22 DIAGNOSIS — M15.9 GENERALIZED OA: ICD-10-CM

## 2025-04-22 DIAGNOSIS — D50.9 IRON DEFICIENCY ANEMIA, UNSPECIFIED IRON DEFICIENCY ANEMIA TYPE: ICD-10-CM

## 2025-04-22 PROCEDURE — G2211 COMPLEX E/M VISIT ADD ON: HCPCS | Performed by: INTERNAL MEDICINE

## 2025-04-22 PROCEDURE — 99214 OFFICE O/P EST MOD 30 MIN: CPT | Performed by: INTERNAL MEDICINE

## 2025-04-22 PROCEDURE — G0296 VISIT TO DETERM LDCT ELIG: HCPCS | Performed by: INTERNAL MEDICINE

## 2025-04-22 PROCEDURE — 3074F SYST BP LT 130 MM HG: CPT | Performed by: INTERNAL MEDICINE

## 2025-04-22 PROCEDURE — 3078F DIAST BP <80 MM HG: CPT | Performed by: INTERNAL MEDICINE

## 2025-04-22 ASSESSMENT — PATIENT HEALTH QUESTIONNAIRE - PHQ9
SUM OF ALL RESPONSES TO PHQ QUESTIONS 1-9: 0
2. FEELING DOWN, DEPRESSED OR HOPELESS: NOT AT ALL
SUM OF ALL RESPONSES TO PHQ QUESTIONS 1-9: 0
1. LITTLE INTEREST OR PLEASURE IN DOING THINGS: NOT AT ALL
SUM OF ALL RESPONSES TO PHQ QUESTIONS 1-9: 0
SUM OF ALL RESPONSES TO PHQ QUESTIONS 1-9: 0

## 2025-04-22 NOTE — PROGRESS NOTES
Jennifer Velasquez presents today for   Chief Complaint   Patient presents with    6 Month Follow-Up    Hypertension       \"Have you been to the ER, urgent care clinic since your last visit?  Hospitalized since your last visit?\"    NO    “Have you seen or consulted any other health care providers outside of [facility] since your last visit?”    YES - When: approximately 6 months ago.  Where and Why: [facility], fracture of fifth metatarsal bone of left foot.        “Have you had a pap smear?”    NO    No cervical cancer screening on file

## 2025-05-01 ENCOUNTER — HOSPITAL ENCOUNTER (OUTPATIENT)
Facility: HOSPITAL | Age: 65
Discharge: HOME | End: 2025-05-01
Attending: INTERNAL MEDICINE
Payer: COMMERCIAL

## 2025-05-01 DIAGNOSIS — Z87.891 PERSONAL HISTORY OF TOBACCO USE: ICD-10-CM

## 2025-05-01 PROCEDURE — 71271 CT THORAX LUNG CANCER SCR C-: CPT

## 2025-05-05 ENCOUNTER — RESULTS FOLLOW-UP (OUTPATIENT)
Facility: CLINIC | Age: 65
End: 2025-05-05

## 2025-05-05 DIAGNOSIS — E78.5 HYPERLIPIDEMIA, UNSPECIFIED HYPERLIPIDEMIA TYPE: Primary | ICD-10-CM

## 2025-05-06 NOTE — TELEPHONE ENCOUNTER
Called and spoke to patient, relayed result note from Dr. Covington; patient verbalized understanding.    No further action required.

## 2025-06-03 DIAGNOSIS — E78.5 HYPERLIPIDEMIA, UNSPECIFIED HYPERLIPIDEMIA TYPE: ICD-10-CM

## 2025-06-03 RX ORDER — ROSUVASTATIN CALCIUM 20 MG/1
20 TABLET, COATED ORAL DAILY
Qty: 90 TABLET | Refills: 3 | Status: SHIPPED | OUTPATIENT
Start: 2025-06-03

## 2025-06-23 ENCOUNTER — TRANSCRIBE ORDERS (OUTPATIENT)
Facility: HOSPITAL | Age: 65
End: 2025-06-23

## 2025-06-23 DIAGNOSIS — M54.2 NECK PAIN: Primary | ICD-10-CM

## 2025-06-23 DIAGNOSIS — M48.02 SPINAL STENOSIS IN CERVICAL REGION: ICD-10-CM

## 2025-06-30 ENCOUNTER — HOSPITAL ENCOUNTER (OUTPATIENT)
Age: 65
Discharge: HOME | End: 2025-07-03
Payer: COMMERCIAL

## 2025-06-30 DIAGNOSIS — M48.02 SPINAL STENOSIS IN CERVICAL REGION: ICD-10-CM

## 2025-06-30 DIAGNOSIS — M54.2 NECK PAIN: ICD-10-CM

## 2025-06-30 LAB — CREAT UR-MCNC: 1 MG/DL (ref 0.6–1.3)

## 2025-06-30 PROCEDURE — 82565 ASSAY OF CREATININE: CPT

## 2025-06-30 PROCEDURE — 6360000004 HC RX CONTRAST MEDICATION: Performed by: PEDIATRICS

## 2025-06-30 PROCEDURE — 72156 MRI NECK SPINE W/O & W/DYE: CPT

## 2025-06-30 PROCEDURE — A9577 INJ MULTIHANCE: HCPCS | Performed by: PEDIATRICS

## 2025-06-30 RX ADMIN — GADOBENATE DIMEGLUMINE 14 ML: 529 INJECTION, SOLUTION INTRAVENOUS at 17:08

## 2025-08-29 DIAGNOSIS — E55.9 HYPOVITAMINOSIS D: ICD-10-CM

## 2025-09-02 RX ORDER — ERGOCALCIFEROL 1.25 MG/1
50000 CAPSULE, LIQUID FILLED ORAL WEEKLY
Qty: 12 CAPSULE | Refills: 3 | Status: SHIPPED | OUTPATIENT
Start: 2025-09-02

## (undated) DEVICE — SOL IRRIGATION INJ NACL 0.9% 500ML BTL

## (undated) DEVICE — Device

## (undated) DEVICE — FLEX ADVANTAGE 3000CC: Brand: FLEX ADVANTAGE

## (undated) DEVICE — BASIN EMESIS 500CC ROSE 250/CS 60/PLT: Brand: MEDEGEN MEDICAL PRODUCTS, LLC

## (undated) DEVICE — SUTURE VCRL SZ 3-0 L27IN ABSRB UD L26MM SH 1/2 CIR J416H

## (undated) DEVICE — SYR 50ML SLIP TIP NSAF LF STRL --

## (undated) DEVICE — BITE BLOCK ENDOSCP UNIV AD 6 TO 9.4 MM

## (undated) DEVICE — MEDI-VAC NON-CONDUCTIVE SUCTION TUBING: Brand: CARDINAL HEALTH

## (undated) DEVICE — SUTURE MCRYL SZ 4-0 L18IN ABSRB UD L19MM PS-2 3/8 CIR PRIM Y496G

## (undated) DEVICE — KIT PROC PLAS BRST CUST LF --

## (undated) DEVICE — STERILE POLYISOPRENE POWDER-FREE SURGICAL GLOVES: Brand: PROTEXIS

## (undated) DEVICE — AIRLIFE™ NASAL OXYGEN CANNULA CURVED, FLARED TIP WITH 14 FOOT (4.3 M) CRUSH-RESISTANT TUBING, OVER-THE-EAR STYLE: Brand: AIRLIFE™

## (undated) DEVICE — TOWEL SURG W16XL26IN BLU NONFENESTRATED DLX ST 2 PER PK

## (undated) DEVICE — INTENDED FOR TISSUE SEPARATION, AND OTHER PROCEDURES THAT REQUIRE A SHARP SURGICAL BLADE TO PUNCTURE OR CUT.: Brand: BARD-PARKER ® CARBON RIB-BACK BLADES

## (undated) DEVICE — CATHETER SUCT TR FL TIP 14FR W/ O CTRL

## (undated) DEVICE — MEDI-VAC SUCTION HIGH CAPACITY: Brand: CARDINAL HEALTH

## (undated) DEVICE — FLEX ADVANTAGE 1500CC: Brand: FLEX ADVANTAGE

## (undated) DEVICE — ENDOSCOPY PUMP TUBING/ CAP SET: Brand: ERBE

## (undated) DEVICE — GARMENT,MEDLINE,DVT,INT,CALF,MED, GEN2: Brand: MEDLINE

## (undated) DEVICE — YANKAUER,FLEXIBLE HANDLE,REGLR CAPACITY: Brand: MEDLINE INDUSTRIES, INC.

## (undated) DEVICE — (D)PREP SKN CHLRAPRP APPL 26ML -- CONVERT TO ITEM 371833

## (undated) DEVICE — FLUFF AND POLYMER UNDERPAD,EXTRA HEAVY: Brand: WINGS

## (undated) DEVICE — FCPS RAD JAW 4LC 240CM W/NDL -- BX/20 RADIAL JAW 4

## (undated) DEVICE — CATH IV SAFE STR 22GX1IN BLU -- PROTECTIV PLUS

## (undated) DEVICE — REM POLYHESIVE ADULT PATIENT RETURN ELECTRODE: Brand: VALLEYLAB

## (undated) DEVICE — DERMABOND SKIN ADH 0.7ML -- DERMABOND ADVANCED 12/BX

## (undated) DEVICE — GAUZE,SPONGE,4"X4",16PLY,STRL,LF,10/TRAY: Brand: MEDLINE

## (undated) DEVICE — GAUZE,SPONGE,8"X4",12PLY,XRAY,STRL,LF: Brand: MEDLINE

## (undated) DEVICE — AIRLIFE™ NASAL OXYGEN CANNULA CURVED, NONFLARED TIP WITH 14 FOOT (4.3 M) CRUSH-RESISTANT TUBING, OVER-THE-EAR STYLE: Brand: AIRLIFE™

## (undated) DEVICE — SOLUTION IRRIG 1000ML H2O STRL BLT

## (undated) DEVICE — SYRINGE MED 25GA 3ML L5/8IN SUBQ PLAS W/ DETACH NDL SFTY